# Patient Record
Sex: FEMALE | Race: WHITE | NOT HISPANIC OR LATINO | Employment: FULL TIME | URBAN - NONMETROPOLITAN AREA
[De-identification: names, ages, dates, MRNs, and addresses within clinical notes are randomized per-mention and may not be internally consistent; named-entity substitution may affect disease eponyms.]

---

## 2017-01-03 ENCOUNTER — TELEPHONE (OUTPATIENT)
Dept: GASTROENTEROLOGY | Facility: CLINIC | Age: 52
End: 2017-01-03

## 2017-09-30 ENCOUNTER — HOSPITAL ENCOUNTER (OUTPATIENT)
Age: 52
Setting detail: OBSERVATION
Discharge: HOME | End: 2017-10-01
Attending: EMERGENCY MEDICINE | Admitting: INTERNAL MEDICINE

## 2017-09-30 ENCOUNTER — APPOINTMENT (OUTPATIENT)
Dept: GENERAL RADIOLOGY | Age: 52
End: 2017-09-30

## 2017-09-30 DIAGNOSIS — R07.89 CHEST DISCOMFORT: Primary | ICD-10-CM

## 2017-09-30 LAB
ALBUMIN SERPL-MCNC: 4.2 G/DL (ref 3.5–5.2)
ALP BLD-CCNC: 87 U/L (ref 35–104)
ALT SERPL-CCNC: 21 U/L (ref 5–33)
ANION GAP SERPL CALCULATED.3IONS-SCNC: 12 MMOL/L (ref 7–19)
AST SERPL-CCNC: 20 U/L (ref 5–32)
BASOPHILS ABSOLUTE: 0.1 K/UL (ref 0–0.2)
BASOPHILS RELATIVE PERCENT: 0.8 % (ref 0–1)
BILIRUB SERPL-MCNC: <0.2 MG/DL (ref 0.2–1.2)
BUN BLDV-MCNC: 12 MG/DL (ref 6–20)
CALCIUM SERPL-MCNC: 9.6 MG/DL (ref 8.6–10)
CHLORIDE BLD-SCNC: 98 MMOL/L (ref 98–111)
CO2: 31 MMOL/L (ref 22–29)
CREAT SERPL-MCNC: 0.9 MG/DL (ref 0.5–0.9)
EOSINOPHILS ABSOLUTE: 0.3 K/UL (ref 0–0.6)
EOSINOPHILS RELATIVE PERCENT: 4.5 % (ref 0–5)
GFR NON-AFRICAN AMERICAN: >60
GLUCOSE BLD-MCNC: 117 MG/DL (ref 74–109)
HCT VFR BLD CALC: 37.9 % (ref 37–47)
HEMOGLOBIN: 12.2 G/DL (ref 12–16)
LYMPHOCYTES ABSOLUTE: 2.5 K/UL (ref 1.1–4.5)
LYMPHOCYTES RELATIVE PERCENT: 38 % (ref 20–40)
MCH RBC QN AUTO: 27.7 PG (ref 27–31)
MCHC RBC AUTO-ENTMCNC: 32.2 G/DL (ref 33–37)
MCV RBC AUTO: 86.1 FL (ref 81–99)
MONOCYTES ABSOLUTE: 0.5 K/UL (ref 0–0.9)
MONOCYTES RELATIVE PERCENT: 7.8 % (ref 0–10)
NEUTROPHILS ABSOLUTE: 3.1 K/UL (ref 1.5–7.5)
NEUTROPHILS RELATIVE PERCENT: 48.6 % (ref 50–65)
PDW BLD-RTO: 14.1 % (ref 11.5–14.5)
PERFORMED ON: NORMAL
PLATELET # BLD: 276 K/UL (ref 130–400)
PMV BLD AUTO: 9.9 FL (ref 9.4–12.3)
POC TROPONIN I: 0 NG/ML (ref 0–0.08)
POTASSIUM SERPL-SCNC: 3.6 MMOL/L (ref 3.5–5)
RBC # BLD: 4.4 M/UL (ref 4.2–5.4)
SODIUM BLD-SCNC: 141 MMOL/L (ref 136–145)
TOTAL PROTEIN: 7.4 G/DL (ref 6.6–8.7)
WBC # BLD: 6.5 K/UL (ref 4.8–10.8)

## 2017-09-30 PROCEDURE — 80053 COMPREHEN METABOLIC PANEL: CPT

## 2017-09-30 PROCEDURE — 71010 XR CHEST PORTABLE: CPT

## 2017-09-30 PROCEDURE — 85025 COMPLETE CBC W/AUTO DIFF WBC: CPT

## 2017-09-30 PROCEDURE — 2580000003 HC RX 258: Performed by: INTERNAL MEDICINE

## 2017-09-30 PROCEDURE — 6370000000 HC RX 637 (ALT 250 FOR IP): Performed by: EMERGENCY MEDICINE

## 2017-09-30 PROCEDURE — 36415 COLL VENOUS BLD VENIPUNCTURE: CPT

## 2017-09-30 PROCEDURE — G0378 HOSPITAL OBSERVATION PER HR: HCPCS

## 2017-09-30 PROCEDURE — 84484 ASSAY OF TROPONIN QUANT: CPT

## 2017-09-30 PROCEDURE — 96372 THER/PROPH/DIAG INJ SC/IM: CPT

## 2017-09-30 PROCEDURE — 6360000002 HC RX W HCPCS: Performed by: INTERNAL MEDICINE

## 2017-09-30 PROCEDURE — 99220 PR INITIAL OBSERVATION CARE/DAY 70 MINUTES: CPT | Performed by: INTERNAL MEDICINE

## 2017-09-30 PROCEDURE — 6370000000 HC RX 637 (ALT 250 FOR IP): Performed by: INTERNAL MEDICINE

## 2017-09-30 PROCEDURE — 99283 EMERGENCY DEPT VISIT LOW MDM: CPT | Performed by: EMERGENCY MEDICINE

## 2017-09-30 PROCEDURE — 93005 ELECTROCARDIOGRAM TRACING: CPT

## 2017-09-30 PROCEDURE — 99285 EMERGENCY DEPT VISIT HI MDM: CPT

## 2017-09-30 RX ORDER — CETIRIZINE HYDROCHLORIDE 10 MG/1
10 TABLET ORAL DAILY
COMMUNITY

## 2017-09-30 RX ORDER — PHENOL 1.4 %
AEROSOL, SPRAY (ML) MUCOUS MEMBRANE NIGHTLY
COMMUNITY

## 2017-09-30 RX ORDER — ONDANSETRON 2 MG/ML
4 INJECTION INTRAMUSCULAR; INTRAVENOUS EVERY 6 HOURS PRN
Status: DISCONTINUED | OUTPATIENT
Start: 2017-09-30 | End: 2017-10-01 | Stop reason: HOSPADM

## 2017-09-30 RX ORDER — LANOLIN ALCOHOL/MO/W.PET/CERES
9 CREAM (GRAM) TOPICAL NIGHTLY
Status: DISCONTINUED | OUTPATIENT
Start: 2017-09-30 | End: 2017-10-01 | Stop reason: HOSPADM

## 2017-09-30 RX ORDER — SODIUM CHLORIDE 0.9 % (FLUSH) 0.9 %
10 SYRINGE (ML) INJECTION EVERY 12 HOURS SCHEDULED
Status: DISCONTINUED | OUTPATIENT
Start: 2017-09-30 | End: 2017-10-01 | Stop reason: HOSPADM

## 2017-09-30 RX ORDER — VALSARTAN 160 MG/1
160 TABLET ORAL DAILY
Status: DISCONTINUED | OUTPATIENT
Start: 2017-09-30 | End: 2017-10-01 | Stop reason: HOSPADM

## 2017-09-30 RX ORDER — ASPIRIN 81 MG/1
81 TABLET, CHEWABLE ORAL DAILY
Status: DISCONTINUED | OUTPATIENT
Start: 2017-09-30 | End: 2017-10-01 | Stop reason: HOSPADM

## 2017-09-30 RX ORDER — ACETAMINOPHEN 325 MG/1
650 TABLET ORAL EVERY 4 HOURS PRN
Status: DISCONTINUED | OUTPATIENT
Start: 2017-09-30 | End: 2017-10-01 | Stop reason: HOSPADM

## 2017-09-30 RX ORDER — IBUPROFEN 600 MG/1
600 TABLET ORAL
Status: DISCONTINUED | OUTPATIENT
Start: 2017-09-30 | End: 2017-10-01 | Stop reason: HOSPADM

## 2017-09-30 RX ORDER — ACETAMINOPHEN 500 MG
1000 TABLET ORAL ONCE
Status: COMPLETED | OUTPATIENT
Start: 2017-09-30 | End: 2017-09-30

## 2017-09-30 RX ORDER — PANTOPRAZOLE SODIUM 40 MG/1
40 TABLET, DELAYED RELEASE ORAL
Status: DISCONTINUED | OUTPATIENT
Start: 2017-10-01 | End: 2017-10-01 | Stop reason: HOSPADM

## 2017-09-30 RX ORDER — ALPRAZOLAM 0.5 MG/1
0.5 TABLET ORAL NIGHTLY PRN
Status: DISCONTINUED | OUTPATIENT
Start: 2017-09-30 | End: 2017-10-01 | Stop reason: HOSPADM

## 2017-09-30 RX ORDER — ASPIRIN 81 MG/1
324 TABLET, CHEWABLE ORAL ONCE
Status: COMPLETED | OUTPATIENT
Start: 2017-09-30 | End: 2017-09-30

## 2017-09-30 RX ORDER — SODIUM CHLORIDE 0.9 % (FLUSH) 0.9 %
10 SYRINGE (ML) INJECTION PRN
Status: DISCONTINUED | OUTPATIENT
Start: 2017-09-30 | End: 2017-10-01 | Stop reason: HOSPADM

## 2017-09-30 RX ORDER — LEVOTHYROXINE SODIUM 0.05 MG/1
50 TABLET ORAL DAILY
Status: DISCONTINUED | OUTPATIENT
Start: 2017-10-01 | End: 2017-10-01 | Stop reason: HOSPADM

## 2017-09-30 RX ORDER — QUETIAPINE FUMARATE 300 MG/1
600 TABLET, FILM COATED ORAL NIGHTLY
Status: DISCONTINUED | OUTPATIENT
Start: 2017-09-30 | End: 2017-10-01 | Stop reason: HOSPADM

## 2017-09-30 RX ORDER — NITROGLYCERIN 0.4 MG/1
0.4 TABLET SUBLINGUAL EVERY 5 MIN PRN
Status: DISCONTINUED | OUTPATIENT
Start: 2017-09-30 | End: 2017-10-01 | Stop reason: HOSPADM

## 2017-09-30 RX ORDER — CETIRIZINE HYDROCHLORIDE 10 MG/1
10 TABLET ORAL DAILY
Status: DISCONTINUED | OUTPATIENT
Start: 2017-09-30 | End: 2017-10-01 | Stop reason: HOSPADM

## 2017-09-30 RX ADMIN — MELATONIN 3 MG ORAL TABLET 9 MG: 3 TABLET ORAL at 21:30

## 2017-09-30 RX ADMIN — ASPIRIN 81 MG CHEWABLE TABLET 324 MG: 81 TABLET CHEWABLE at 17:39

## 2017-09-30 RX ADMIN — NITROGLYCERIN 0.4 MG: 0.4 TABLET SUBLINGUAL at 17:39

## 2017-09-30 RX ADMIN — QUETIAPINE FUMARATE 600 MG: 300 TABLET, FILM COATED ORAL at 22:13

## 2017-09-30 RX ADMIN — Medication 10 ML: at 21:33

## 2017-09-30 RX ADMIN — ENOXAPARIN SODIUM 40 MG: 40 INJECTION SUBCUTANEOUS at 21:32

## 2017-09-30 RX ADMIN — ACETAMINOPHEN 1000 MG: 500 TABLET ORAL at 18:28

## 2017-09-30 ASSESSMENT — PAIN SCALES - GENERAL
PAINLEVEL_OUTOF10: 7
PAINLEVEL_OUTOF10: 5
PAINLEVEL_OUTOF10: 7
PAINLEVEL_OUTOF10: 7

## 2017-09-30 ASSESSMENT — ENCOUNTER SYMPTOMS
NAUSEA: 1
SHORTNESS OF BREATH: 1
ABDOMINAL PAIN: 0
BACK PAIN: 0
VOMITING: 0

## 2017-09-30 ASSESSMENT — PAIN DESCRIPTION - LOCATION
LOCATION: CHEST
LOCATION: CHEST

## 2017-09-30 ASSESSMENT — PAIN DESCRIPTION - PAIN TYPE: TYPE: ACUTE PAIN

## 2017-09-30 ASSESSMENT — PAIN DESCRIPTION - DESCRIPTORS: DESCRIPTORS: PRESSURE

## 2017-09-30 NOTE — IP AVS SNAPSHOT
Patient Information     Patient Name GRAEME Walls 1965         This is your updated medication list to keep with you all times      TAKE these medications     ALPRAZolam 0.5 MG tablet   Commonly known as:  XANAX       aspirin 81 MG chewable tablet   Take 1 tablet by mouth daily       CALCIUM 600+D 600-800 MG-UNIT Tabs   Generic drug:  Calcium Carb-Cholecalciferol       calcium-vitamin D 500-200 MG-UNIT per tablet       fluticasone 50 MCG/ACT nasal spray   Commonly known as:  FLONASE   2 sprays by Nasal route daily       levothyroxine 50 MCG tablet   Commonly known as:  SYNTHROID       Melatonin 10 MG Tabs       PRILOSEC 40 MG delayed release capsule   Generic drug:  omeprazole       SEROQUEL 300 MG tablet   Generic drug:  QUEtiapine       valsartan 160 MG tablet   Commonly known as:  DIOVAN       ZORVOLEX 18 MG Caps   Generic drug:  Diclofenac       ZYRTEC ALLERGY 10 MG tablet   Generic drug:  cetirizine

## 2017-09-30 NOTE — IP AVS SNAPSHOT
After Visit Summary  (Discharge Instructions)    Medication List for Home    Based on the information you provided to us as well as any changes during this visit, the following is your updated medication list.  Compare this with your prescription bottles at home. If you have any questions or concerns, contact your primary care physician's office. Daily Medication List (This medication list can be shared with any healthcare provider who is helping you manage your medications)      There are NEW medications for you.  START taking them after you leave the hospital        Last Dose    Next Dose Due AM NOON PM NIGHT    aspirin 81 MG chewable tablet   Take 1 tablet by mouth daily                81 mg on 10/1/2017 10:45 AM     10/2/17 9:00 am                            These are medications you told us you were taking at home, CONTINUE taking them after you leave the hospital        Last Dose    Next Dose Due AM NOON PM NIGHT    ALPRAZolam 0.5 MG tablet   Commonly known as:  XANAX   Take 0.5 mg by mouth nightly as needed for Sleep                  As needed                       CALCIUM 600+D 600-800 MG-UNIT Tabs   Generic drug:  Calcium Carb-Cholecalciferol   Take by mouth daily                  10/2/17 9:00 am                          calcium-vitamin D 500-200 MG-UNIT per tablet   Take 1 tablet by mouth 2 times daily (with meals)                  10/1/17 5:00 pm                          fluticasone 50 MCG/ACT nasal spray   Commonly known as:  FLONASE   2 sprays by Nasal route daily                  10/1/17 9:00 pm                          levothyroxine 50 MCG tablet   Commonly known as:  SYNTHROID   Take 50 mcg by mouth Daily                50 mcg on 10/1/2017  6:30 AM     10/2/17 6:00 am                          Melatonin 10 MG Tabs   Take by mouth nightly                9 mg on 9/30/2017  9:30 PM     10/1/17 9:00 pm                          PRILOSEC 40 MG delayed release capsule · Instructions about your medications including a list of your home medications  · A summary of your hospital visit  · Follow-up appointments once you have left the hospital  · Your care plan at home      You may receive a survey regarding the care you received during your stay. Your input is valuable to us. We encourage you to complete and return your survey in the envelope provided. We hope you will choose us in the future for your healthcare needs. Patient Information     Patient Name GRAEME Rojas 1965      Care Provided at:     Name Address Phone       [address] [phone]            Your Visit    Here you will find information about your visit, including the reason for your visit. Please take this sheet with you when you visit your doctor or other health care provider in the future. It will help determine the best possible medical care for you at that time. If you have any questions once you leave the hospital, please call the department phone number listed below. Why you were here     Your primary diagnosis was:  Not on File    Your diagnoses also included:  Chest Pressure      Visit Information     Date & Time Provider Department Dept. Phone    2017 Perla Stern MD [facility] 7 [facility] [phone]       Follow-up Appointments    Below is a list of your follow-up and future appointments. This may not be a complete list as you may have made appointments directly with providers that we are not aware of or your providers may have made some for you. Please call your providers to confirm appointments. It is important to keep your appointments. Please bring your current insurance card, photo ID, co-pay, and all medication bottles to your appointment. If self-pay, payment is expected at the time of service.         Follow-up Information     Follow up with Moses Wilburn MD. Schedule an appointment as soon as possible for a visit in 1 week. Specialty:  Family Medicine    Contact information:    [address]        Preventive Care        Date Due    Hepatitis C screening is recommended for all adults regardless of risk factors born between [facility] at least once (lifetime) who have never been tested. 1965    HIV screening is recommended for all people regardless of risk factors  aged 15-65 years at least once (lifetime) who have never been HIV tested. 7/7/1978    Tetanus Combination Vaccine (1 - Tdap) 7/7/1982    Diabetes Screening 7/7/2003    Mammograms are recommended every 2 years for low/average risk patients aged 48 - 69, and every year for high risk patients per updated national guidelines. However these guidelines can be individualized by your provider. 10/14/2016    Yearly Flu Vaccine (1) 9/1/2017    Cholesterol Screening 3/25/2020    Colonoscopy 10/2/2023                 Care Plan Once You Return Home    This section includes instructions you will need to follow once you leave the hospital.  Your care team will discuss these with you, so you and those caring for you know how to best care for your health needs at home. This section may also include educational information about certain health topics that may be of help to you. [employer] Signup     Our records indicate that you have an active [email or web address] account. You can view your After Visit Summary by going to [email or web address] and logging in with your [email or web address] username and password. If you don't have a [email or web address] username and password but a parent or guardian has access to your record, the parent or guardian should login with their own [email or web address] username and password and access your record to view the After Visit Summary.      Additional Information  If you have questions, please contact the physician practice where you receive care. Remember, MyChart is NOT to be used for urgent needs. For medical emergencies, dial 911. For questions regarding your MyChart account call [phone]. If you have a clinical question, please call your doctor's office. View your information online  ? Review your current list of  medications, immunization, and allergies. ? Review your future test results online . ? Review your discharge instructions provided by your caregivers at discharge    Certain functionality such as prescription refills, scheduling appointments or sending messages to your provider are not activated if your provider does not use [employer] in his/her office    For questions regarding your MyChart account call [phone]. If you have a clinical question, please call your doctor's office. The information on all pages of the After Visit Summary has been reviewed with me, the patient and/or responsible adult, by my health care provider(s). I had the opportunity to ask questions regarding this information. I understand I should dispose of my armband safely at home to protect my health information. A complete copy of the After Visit Summary has been given to me, the patient and/or responsible adult.            Patient Signature/Responsible Adult:____________________    Clinician Signature:_____________________    Date:_____________________    Time:_____________________

## 2017-09-30 NOTE — ED PROVIDER NOTES
as noted above the remainder of the review of systems was reviewed and negative. PAST MEDICAL HISTORY     Past Medical History:   Diagnosis Date    Anxiety     Bulging lumbar disc     L3-L4    Depression     GERD (gastroesophageal reflux disease)     HTN (hypertension)     Hypothyroid          SURGICAL HISTORY       Past Surgical History:   Procedure Laterality Date    CHOLECYSTECTOMY      HYSTERECTOMY           CURRENT MEDICATIONS       Current Discharge Medication List      CONTINUE these medications which have NOT CHANGED    Details   Calcium Carbonate-Vitamin D (CALCIUM-VITAMIN D) 500-200 MG-UNIT per tablet Take 1 tablet by mouth 2 times daily (with meals)      Melatonin 10 MG TABS Take by mouth      cetirizine (ZYRTEC ALLERGY) 10 MG tablet Take 10 mg by mouth daily      omeprazole (PRILOSEC) 40 MG capsule Take 20 mg by mouth daily       valsartan (DIOVAN) 160 MG tablet Take 160 mg by mouth daily      QUEtiapine (SEROQUEL) 300 MG tablet Take 600 mg by mouth nightly      Diclofenac (ZORVOLEX) 18 MG CAPS Take 50 mg by mouth three times daily       levothyroxine (SYNTHROID) 50 MCG tablet Take 50 mcg by mouth Daily      ALPRAZolam (XANAX) 0.5 MG tablet Take 0.5 mg by mouth nightly as needed for Sleep      fluticasone (FLONASE) 50 MCG/ACT nasal spray 2 sprays by Nasal route daily  Qty: 1 Bottle, Refills: 0             ALLERGIES     Review of patient's allergies indicates no known allergies.     FAMILY HISTORY       Family History   Problem Relation Age of Onset    Diabetes Mother     Heart Disease Mother     Stroke Father     Diabetes Maternal Aunt     Cancer Maternal Aunt     Heart Disease Maternal Uncle           SOCIAL HISTORY       Social History     Social History    Marital status:      Spouse name: N/A    Number of children: N/A    Years of education: N/A     Social History Main Topics    Smoking status: Never Smoker    Smokeless tobacco: Never Used    Alcohol use No    Drug use: No    Sexual activity: Yes     Partners: Male     Other Topics Concern    None     Social History Narrative       SCREENINGS             PHYSICAL EXAM    (up to 7 for level 4, 8 or more for level 5)   ED Triage Vitals   BP Temp Temp Source Pulse Resp SpO2 Height Weight   09/30/17 1627 09/30/17 1627 09/30/17 1627 09/30/17 1627 09/30/17 1627 09/30/17 1627 09/30/17 1627 09/30/17 1627   131/83 98.2 °F (36.8 °C) Oral 92 18 94 % 5' 3\" (1.6 m) 240 lb (108.9 kg)       Physical Exam   Constitutional: She is oriented to person, place, and time. She appears well-developed and well-nourished. No distress. HENT:   Mouth/Throat: Oropharynx is clear and moist.   Neck: Normal range of motion. Neck supple. Cardiovascular: Normal rate, regular rhythm and normal heart sounds. Pulmonary/Chest: Effort normal and breath sounds normal.   Musculoskeletal: She exhibits no edema. Neurological: She is alert and oriented to person, place, and time. Skin: Skin is warm and dry. She is not diaphoretic. Psychiatric: She has a normal mood and affect. Nursing note and vitals reviewed. DIAGNOSTIC RESULTS     EKG: All EKG's are interpreted by the Emergency Department Physician who either signs or Co-signs this chart in the absence of a cardiologist.     Regular rate and rhythm. Normal P waves and MA interval. Normal QRS. Normal QT interval. No ST elevation or depression. This EKG was interpreted by me. RADIOLOGY:   Non-plain film images such as CT, Ultrasound and MRI are read by the radiologist. Plain radiographic images are visualized and preliminarily interpreted by the emergency physician with the below findings:        Interpretation per the Radiologist below, if available at the time of this note:    XR Chest Portable   Final Result   Impression:   No acute cardiopulmonary findings.    Signed by Dr Ileene Cheadle on 9/30/2017 6:19 PM      NM Myocardial Spect Rest Multi    (Results Pending)         ED BEDSIDE ULTRASOUND: List             (Please note that portions of this note were completed with a voice recognition program.  Efforts were made to edit the dictations but occasionally words are mis-transcribed.)    Brad Ochoa MD (electronically signed)  Attending Emergency Physician       Brad Ochoa MD  09/30/17 3571

## 2017-10-01 ENCOUNTER — APPOINTMENT (OUTPATIENT)
Dept: NUCLEAR MEDICINE | Age: 52
End: 2017-10-01

## 2017-10-01 VITALS
SYSTOLIC BLOOD PRESSURE: 118 MMHG | RESPIRATION RATE: 16 BRPM | WEIGHT: 231.2 LBS | OXYGEN SATURATION: 95 % | BODY MASS INDEX: 40.96 KG/M2 | TEMPERATURE: 98.2 F | HEART RATE: 95 BPM | DIASTOLIC BLOOD PRESSURE: 83 MMHG | HEIGHT: 63 IN

## 2017-10-01 LAB
ALBUMIN SERPL-MCNC: 3.5 G/DL (ref 3.5–5.2)
ALP BLD-CCNC: 77 U/L (ref 35–104)
ALT SERPL-CCNC: 19 U/L (ref 5–33)
ANION GAP SERPL CALCULATED.3IONS-SCNC: 13 MMOL/L (ref 7–19)
AST SERPL-CCNC: 18 U/L (ref 5–32)
BASOPHILS ABSOLUTE: 0 K/UL (ref 0–0.2)
BASOPHILS RELATIVE PERCENT: 0.6 % (ref 0–1)
BILIRUB SERPL-MCNC: <0.2 MG/DL (ref 0.2–1.2)
BUN BLDV-MCNC: 12 MG/DL (ref 6–20)
CALCIUM SERPL-MCNC: 9.4 MG/DL (ref 8.6–10)
CHLORIDE BLD-SCNC: 99 MMOL/L (ref 98–111)
CHOLESTEROL, TOTAL: 205 MG/DL (ref 160–199)
CO2: 27 MMOL/L (ref 22–29)
CREAT SERPL-MCNC: 0.9 MG/DL (ref 0.5–0.9)
EOSINOPHILS ABSOLUTE: 0.3 K/UL (ref 0–0.6)
EOSINOPHILS RELATIVE PERCENT: 3.8 % (ref 0–5)
GFR NON-AFRICAN AMERICAN: >60
GLUCOSE BLD-MCNC: 118 MG/DL (ref 74–109)
HCT VFR BLD CALC: 33.8 % (ref 37–47)
HDLC SERPL-MCNC: 52 MG/DL (ref 65–121)
HEMOGLOBIN: 10.9 G/DL (ref 12–16)
LDL CHOLESTEROL CALCULATED: 131 MG/DL
LV EF: 58 %
LVEF MODALITY: NORMAL
LYMPHOCYTES ABSOLUTE: 2.7 K/UL (ref 1.1–4.5)
LYMPHOCYTES RELATIVE PERCENT: 39.4 % (ref 20–40)
MCH RBC QN AUTO: 27.8 PG (ref 27–31)
MCHC RBC AUTO-ENTMCNC: 32.2 G/DL (ref 33–37)
MCV RBC AUTO: 86.2 FL (ref 81–99)
MONOCYTES ABSOLUTE: 0.5 K/UL (ref 0–0.9)
MONOCYTES RELATIVE PERCENT: 7.2 % (ref 0–10)
NEUTROPHILS ABSOLUTE: 3.3 K/UL (ref 1.5–7.5)
NEUTROPHILS RELATIVE PERCENT: 48.7 % (ref 50–65)
PDW BLD-RTO: 14.3 % (ref 11.5–14.5)
PLATELET # BLD: 246 K/UL (ref 130–400)
PMV BLD AUTO: 10.5 FL (ref 9.4–12.3)
POTASSIUM SERPL-SCNC: 3.6 MMOL/L (ref 3.5–5)
RBC # BLD: 3.92 M/UL (ref 4.2–5.4)
SODIUM BLD-SCNC: 139 MMOL/L (ref 136–145)
TOTAL PROTEIN: 6.6 G/DL (ref 6.6–8.7)
TRIGL SERPL-MCNC: 111 MG/DL (ref 150–199)
TROPONIN: <0.01 NG/ML (ref 0–0.03)
TROPONIN: <0.01 NG/ML (ref 0–0.03)
WBC # BLD: 6.8 K/UL (ref 4.8–10.8)

## 2017-10-01 PROCEDURE — 84484 ASSAY OF TROPONIN QUANT: CPT

## 2017-10-01 PROCEDURE — 36415 COLL VENOUS BLD VENIPUNCTURE: CPT

## 2017-10-01 PROCEDURE — 2580000003 HC RX 258: Performed by: INTERNAL MEDICINE

## 2017-10-01 PROCEDURE — G0378 HOSPITAL OBSERVATION PER HR: HCPCS

## 2017-10-01 PROCEDURE — 78452 HT MUSCLE IMAGE SPECT MULT: CPT

## 2017-10-01 PROCEDURE — 3430000000 HC RX DIAGNOSTIC RADIOPHARMACEUTICAL: Performed by: INTERNAL MEDICINE

## 2017-10-01 PROCEDURE — 80053 COMPREHEN METABOLIC PANEL: CPT

## 2017-10-01 PROCEDURE — A9500 TC99M SESTAMIBI: HCPCS | Performed by: INTERNAL MEDICINE

## 2017-10-01 PROCEDURE — 93005 ELECTROCARDIOGRAM TRACING: CPT

## 2017-10-01 PROCEDURE — 85025 COMPLETE CBC W/AUTO DIFF WBC: CPT

## 2017-10-01 PROCEDURE — 99217 PR OBSERVATION CARE DISCHARGE MANAGEMENT: CPT | Performed by: INTERNAL MEDICINE

## 2017-10-01 PROCEDURE — 80061 LIPID PANEL: CPT

## 2017-10-01 PROCEDURE — 93017 CV STRESS TEST TRACING ONLY: CPT

## 2017-10-01 PROCEDURE — 6360000002 HC RX W HCPCS: Performed by: INTERNAL MEDICINE

## 2017-10-01 PROCEDURE — 6370000000 HC RX 637 (ALT 250 FOR IP): Performed by: INTERNAL MEDICINE

## 2017-10-01 PROCEDURE — 93306 TTE W/DOPPLER COMPLETE: CPT

## 2017-10-01 RX ORDER — ASPIRIN 81 MG/1
81 TABLET, CHEWABLE ORAL DAILY
Qty: 30 TABLET | Refills: 3 | Status: ON HOLD | OUTPATIENT
Start: 2017-10-01 | End: 2018-06-15 | Stop reason: HOSPADM

## 2017-10-01 RX ADMIN — TETRAKIS(2-METHOXYISOBUTYLISOCYANIDE)COPPER(I) TETRAFLUOROBORATE 10 MILLICURIE: 1 INJECTION, POWDER, LYOPHILIZED, FOR SOLUTION INTRAVENOUS at 09:42

## 2017-10-01 RX ADMIN — ASPIRIN 81 MG CHEWABLE TABLET 81 MG: 81 TABLET CHEWABLE at 10:45

## 2017-10-01 RX ADMIN — Medication 10 ML: at 10:46

## 2017-10-01 RX ADMIN — Medication 1 TABLET: at 10:46

## 2017-10-01 RX ADMIN — LEVOTHYROXINE SODIUM 50 MCG: 50 TABLET ORAL at 06:30

## 2017-10-01 RX ADMIN — TETRAKIS(2-METHOXYISOBUTYLISOCYANIDE)COPPER(I) TETRAFLUOROBORATE 30 MILLICURIE: 1 INJECTION, POWDER, LYOPHILIZED, FOR SOLUTION INTRAVENOUS at 09:42

## 2017-10-01 RX ADMIN — REGADENOSON 0.4 MG: 0.08 INJECTION, SOLUTION INTRAVENOUS at 07:00

## 2017-10-01 RX ADMIN — VALSARTAN 160 MG: 160 TABLET ORAL at 11:25

## 2017-10-01 RX ADMIN — PANTOPRAZOLE SODIUM 40 MG: 40 TABLET, DELAYED RELEASE ORAL at 06:30

## 2017-10-01 RX ADMIN — ACETAMINOPHEN 650 MG: 325 TABLET, FILM COATED ORAL at 11:26

## 2017-10-01 ASSESSMENT — PAIN DESCRIPTION - PAIN TYPE: TYPE: ACUTE PAIN

## 2017-10-01 ASSESSMENT — PAIN SCALES - GENERAL
PAINLEVEL_OUTOF10: 0
PAINLEVEL_OUTOF10: 5
PAINLEVEL_OUTOF10: 0

## 2017-10-01 ASSESSMENT — PAIN DESCRIPTION - LOCATION: LOCATION: HEAD

## 2017-10-01 NOTE — H&P
Surgical History:    Past Surgical History:   Procedure Laterality Date    CHOLECYSTECTOMY      HYSTERECTOMY           Home Medications:   Prior to Admission medications    Medication Sig Start Date End Date Taking? Authorizing Provider   Calcium Carbonate-Vitamin D (CALCIUM-VITAMIN D) 500-200 MG-UNIT per tablet Take 1 tablet by mouth 2 times daily (with meals)   Yes Historical Provider, MD   Melatonin 10 MG TABS Take by mouth   Yes Historical Provider, MD   cetirizine (ZYRTEC ALLERGY) 10 MG tablet Take 10 mg by mouth daily   Yes Historical Provider, MD   omeprazole (PRILOSEC) 40 MG capsule Take 20 mg by mouth daily    Yes Historical Provider, MD   valsartan (DIOVAN) 160 MG tablet Take 160 mg by mouth daily   Yes Historical Provider, MD   QUEtiapine (SEROQUEL) 300 MG tablet Take 600 mg by mouth nightly   Yes Historical Provider, MD   Diclofenac (ZORVOLEX) 18 MG CAPS Take 50 mg by mouth three times daily    Yes Historical Provider, MD   levothyroxine (SYNTHROID) 50 MCG tablet Take 50 mcg by mouth Daily   Yes Historical Provider, MD   ALPRAZolam Malini Single) 0.5 MG tablet Take 0.5 mg by mouth nightly as needed for Sleep   Yes Historical Provider, MD   fluticasone (FLONASE) 50 MCG/ACT nasal spray 2 sprays by Nasal route daily 11/14/16   Kayli Becerril PA-C        Facility Administered Medications:    sodium chloride flush  10 mL Intravenous 2 times per day    enoxaparin  40 mg Subcutaneous Nightly    aspirin  81 mg Oral Daily    [START ON 10/1/2017] regadenoson  0.4 mg Intravenous Once       Allergies:  Review of patient's allergies indicates no known allergies. Social History:       Social History     Social History    Marital status:      Spouse name: N/A    Number of children: N/A    Years of education: N/A     Occupational History    Not on file.      Social History Main Topics    Smoking status: Never Smoker    Smokeless tobacco: Never Used    Alcohol use No    Drug use: No    Sexual activity: Not on file     Other Topics Concern    Not on file     Social History Narrative       Family History:   History reviewed. No pertinent family history. REVIEW OF SYSTEMS:     Except as noted in the HPI, all other systems are negative        PHYSICAL EXAMINATION:     /76  Pulse 74  Temp 98.2 °F (36.8 °C) (Oral)   Resp 16  Ht 5' 3\" (1.6 m)  Wt 240 lb (108.9 kg)  SpO2 95%  BMI 42.51 kg/m2    GENERAL - well developed and well nourished, in no amount of generalized distress  HEENT   PERRLA, Hearing appears normal, conjunctiva and lids are normal, ears and nose appear normal  NECK - no thyromegaly, no JVD, trachea is in the midline  CARDIOVASCULAR  PMI is in the left mid line clavicular position, Normal S1 and S2 with a grade 1/6 systolic murmur. No S3 or S4    PULMONARY   no respiratory distress. scattered wheezes and rales.    ABDOMEN   soft, non tender, no rebound, no hepatomegaly or splenomegaly  MUSCULOSKELETAL   Prone/Supine, digitals and nails are without clubbing or cyanosis  EXTREMITIES - trace edema  NEUROLOGIC - cranial nerves, II-XII, are normal  SKIN - turgor is normal, no rash  PSYCHIATRIC - normal mood and affect, alert and orientated x 3, judgement and insight appear appropriate      LABORATORY EVALUATION & TESTING:    I have personally reviewed and interpreted the results of the following diagnostic testing      EKG and or Telemetry:  which was personally reviewed me:  Sinus rhythm,   Pulse Readings from Last 1 Encounters:   09/30/17 74    bpm,  without Acute changes    Troponin:  negative myocardial necrosis (  0)    CBC:   Recent Labs      09/30/17   1653   WBC  6.5   HGB  12.2   HCT  37.9   MCV  86.1   PLT  276     BMP:   Recent Labs      09/30/17   1653   NA  141   K  3.6   CL  98   CO2  31*   BUN  12   CREATININE  0.9     Cardiac Enzymes:   Recent Labs      09/30/17   1658   TROPONINI  0.00     PT/INR: No results for input(s): PROTIME, INR in the last 72 2. hypertension Initial presentation during this evaluation   Patient has a history of hypertension, which is managed and is on current therapy. The blood pressure history is:    Systolic (32DTM), KRB:954 , Min:121 , PRISCILLA:385    Diastolic (83VEQ), FD, Min:70, Max:83    No Continue current medications:    Yes           3. Family history of CAD Initial presentation during this evaluation History of MVP Yes    echocardiogram Continue current medications:       NA         PLAN:    1. Continue present medications except for changes as noted above  2. Continue to monitor rhythm  3. Further orders per clinical course. 4. See orders           Discussed with patient and family and nursing.     I greatly appreciate the opportunity and your confidence in allowing me to participate in the care of Stalin Gomez    Electronically signed by Chery Quintanilla MD on 17     [facility] [place]

## 2017-10-01 NOTE — DISCHARGE SUMMARY
ischemia by imaging criteria. This was a most reassuring report and strongly suggested that the patient's chest discomfort was of low probably of representing myocardial ischemia. The rest of the patient's hospitalization was uneventful. She was discharged home on medical therapy with outpatient follow up. Consults:     None      Significant Diagnostic Studies:    1. lexiscan / cardiolyte, 10/01/17     Significant Therapeutic Endeavors:      1. none      Activity: activity as directed per discharge instructions. Diet:  Cardiac diet    Labs:  For convenience and continuity at follow-up the following most recent labs are provided:    CBC:    Lab Results   Component Value Date    WBC 6.8 10/01/2017    HGB 10.9 10/01/2017    HCT 33.8 10/01/2017     10/01/2017       Renal:    Lab Results   Component Value Date     10/01/2017    K 3.6 10/01/2017    CL 99 10/01/2017    CO2 27 10/01/2017    BUN 12 10/01/2017    CREATININE 0.9 10/01/2017    CALCIUM 9.4 10/01/2017         Discharge Medications:     Current Discharge Medication List           Details   Calcium Carbonate-Vitamin D (CALCIUM-VITAMIN D) 500-200 MG-UNIT per tablet Take 1 tablet by mouth 2 times daily (with meals)      Melatonin 10 MG TABS Take by mouth nightly       cetirizine (ZYRTEC ALLERGY) 10 MG tablet Take 10 mg by mouth daily      Calcium Carb-Cholecalciferol (CALCIUM 600+D) 600-800 MG-UNIT TABS Take by mouth daily      fluticasone (FLONASE) 50 MCG/ACT nasal spray 2 sprays by Nasal route daily  Qty: 1 Bottle, Refills: 0      omeprazole (PRILOSEC) 40 MG capsule Take 20 mg by mouth daily       valsartan (DIOVAN) 160 MG tablet Take 160 mg by mouth daily      QUEtiapine (SEROQUEL) 300 MG tablet Take 600 mg by mouth nightly      Diclofenac (ZORVOLEX) 18 MG CAPS Take 50 mg by mouth three times daily       levothyroxine (SYNTHROID) 50 MCG tablet Take 50 mcg by mouth Daily      ALPRAZolam (XANAX) 0.5 MG tablet Take 0.5 mg by mouth nightly

## 2017-10-01 NOTE — ED NOTES
Introduced myself to pt. Pt resting comfortably in bed with family at bedside. No s/s of distress noted. resp even and unlabored. Skin warm and dry. Call light within reach.       Krysta Sanchez RN  09/30/17 2439

## 2017-10-01 NOTE — PROGRESS NOTES
Pt arrived to PCU approximately 1950. She is alert and oriented with no complaints at this time. Telemetry monitor has been applied and shows sinus rhythm at 95. Pt is on room air. Admission complete, medications reviewed and assessment complete. Dr Deborah Jiménez saw pt in the ER and is aware of arrival. NPO status after midnight discussed with pt and she voiced understanding. Call light within reach. Will continue to monitor.

## 2017-10-02 LAB
EKG P AXIS: 36 DEGREES
EKG P-R INTERVAL: 136 MS
EKG Q-T INTERVAL: 360 MS
EKG QRS DURATION: 80 MS
EKG QTC CALCULATION (BAZETT): 437 MS
EKG T AXIS: 48 DEGREES

## 2017-10-05 LAB
LV EF: 71 %
LVEF MODALITY: NORMAL

## 2017-11-30 LAB — HBA1C MFR BLD: 5.5 %

## 2018-06-08 ENCOUNTER — HOSPITAL ENCOUNTER (INPATIENT)
Age: 53
LOS: 7 days | Discharge: HOME | DRG: 378 | End: 2018-06-15
Attending: FAMILY MEDICINE | Admitting: FAMILY MEDICINE
Payer: COMMERCIAL

## 2018-06-08 ENCOUNTER — APPOINTMENT (OUTPATIENT)
Dept: CT IMAGING | Age: 53
DRG: 378 | End: 2018-06-08
Attending: FAMILY MEDICINE
Payer: COMMERCIAL

## 2018-06-08 ENCOUNTER — APPOINTMENT (OUTPATIENT)
Dept: GENERAL RADIOLOGY | Age: 53
DRG: 378 | End: 2018-06-08
Attending: FAMILY MEDICINE
Payer: COMMERCIAL

## 2018-06-08 PROBLEM — R55 SYNCOPE, NEAR: Status: ACTIVE | Noted: 2018-06-08

## 2018-06-08 LAB
ALBUMIN SERPL-MCNC: 4 G/DL (ref 3.5–5.2)
ALP BLD-CCNC: 78 U/L (ref 35–104)
ALT SERPL-CCNC: 14 U/L (ref 5–33)
ANION GAP SERPL CALCULATED.3IONS-SCNC: 11 MMOL/L (ref 7–19)
AST SERPL-CCNC: 14 U/L (ref 5–32)
BILIRUB SERPL-MCNC: <0.2 MG/DL (ref 0.2–1.2)
BILIRUBIN URINE: NEGATIVE
BLOOD, URINE: NEGATIVE
BUN BLDV-MCNC: 19 MG/DL (ref 6–20)
CALCIUM SERPL-MCNC: 9.7 MG/DL (ref 8.6–10)
CHLORIDE BLD-SCNC: 100 MMOL/L (ref 98–111)
CLARITY: ABNORMAL
CO2: 30 MMOL/L (ref 22–29)
COLOR: YELLOW
CREAT SERPL-MCNC: 0.9 MG/DL (ref 0.5–0.9)
GFR NON-AFRICAN AMERICAN: >60
GLUCOSE BLD-MCNC: 150 MG/DL (ref 74–109)
GLUCOSE URINE: NEGATIVE MG/DL
HCT VFR BLD CALC: 29.5 % (ref 37–47)
HEMOGLOBIN: 8.5 G/DL (ref 12–16)
KETONES, URINE: NEGATIVE MG/DL
LEUKOCYTE ESTERASE, URINE: NEGATIVE
LV EF: 58 %
LVEF MODALITY: NORMAL
MCH RBC QN AUTO: 22.6 PG (ref 27–31)
MCHC RBC AUTO-ENTMCNC: 28.8 G/DL (ref 33–37)
MCV RBC AUTO: 78.5 FL (ref 81–99)
NITRITE, URINE: NEGATIVE
PDW BLD-RTO: 15.6 % (ref 11.5–14.5)
PH UA: 5.5
PLATELET # BLD: 309 K/UL (ref 130–400)
PMV BLD AUTO: 9.5 FL (ref 9.4–12.3)
POTASSIUM SERPL-SCNC: 3.8 MMOL/L (ref 3.5–5)
PRO-BNP: 16 PG/ML (ref 0–900)
PROTEIN UA: NEGATIVE MG/DL
RBC # BLD: 3.76 M/UL (ref 4.2–5.4)
SODIUM BLD-SCNC: 141 MMOL/L (ref 136–145)
SPECIFIC GRAVITY UA: 1.02
TOTAL PROTEIN: 7.1 G/DL (ref 6.6–8.7)
TSH SERPL DL<=0.05 MIU/L-ACNC: 1.08 UIU/ML (ref 0.27–4.2)
UROBILINOGEN, URINE: 0.2 E.U./DL
WBC # BLD: 7 K/UL (ref 4.8–10.8)

## 2018-06-08 PROCEDURE — 87086 URINE CULTURE/COLONY COUNT: CPT

## 2018-06-08 PROCEDURE — 70450 CT HEAD/BRAIN W/O DYE: CPT

## 2018-06-08 PROCEDURE — 85027 COMPLETE CBC AUTOMATED: CPT

## 2018-06-08 PROCEDURE — 2580000003 HC RX 258: Performed by: FAMILY MEDICINE

## 2018-06-08 PROCEDURE — 6370000000 HC RX 637 (ALT 250 FOR IP): Performed by: FAMILY MEDICINE

## 2018-06-08 PROCEDURE — 36415 COLL VENOUS BLD VENIPUNCTURE: CPT

## 2018-06-08 PROCEDURE — 83880 ASSAY OF NATRIURETIC PEPTIDE: CPT

## 2018-06-08 PROCEDURE — 80053 COMPREHEN METABOLIC PANEL: CPT

## 2018-06-08 PROCEDURE — 81003 URINALYSIS AUTO W/O SCOPE: CPT

## 2018-06-08 PROCEDURE — 6360000002 HC RX W HCPCS: Performed by: FAMILY MEDICINE

## 2018-06-08 PROCEDURE — 1210000000 HC MED SURG R&B

## 2018-06-08 PROCEDURE — 93306 TTE W/DOPPLER COMPLETE: CPT

## 2018-06-08 PROCEDURE — 84443 ASSAY THYROID STIM HORMONE: CPT

## 2018-06-08 PROCEDURE — G0328 FECAL BLOOD SCRN IMMUNOASSAY: HCPCS

## 2018-06-08 PROCEDURE — 93880 EXTRACRANIAL BILAT STUDY: CPT

## 2018-06-08 PROCEDURE — 71046 X-RAY EXAM CHEST 2 VIEWS: CPT

## 2018-06-08 RX ORDER — MECLIZINE HCL 12.5 MG/1
12.5 TABLET ORAL 3 TIMES DAILY PRN
Status: DISCONTINUED | OUTPATIENT
Start: 2018-06-08 | End: 2018-06-15 | Stop reason: HOSPADM

## 2018-06-08 RX ORDER — ALPRAZOLAM 0.5 MG/1
0.5 TABLET ORAL NIGHTLY PRN
Status: DISCONTINUED | OUTPATIENT
Start: 2018-06-08 | End: 2018-06-15 | Stop reason: HOSPADM

## 2018-06-08 RX ORDER — LEVOTHYROXINE SODIUM 0.05 MG/1
50 TABLET ORAL DAILY
Status: DISCONTINUED | OUTPATIENT
Start: 2018-06-09 | End: 2018-06-15 | Stop reason: HOSPADM

## 2018-06-08 RX ORDER — FLUTICASONE PROPIONATE 50 MCG
2 SPRAY, SUSPENSION (ML) NASAL DAILY
Status: DISCONTINUED | OUTPATIENT
Start: 2018-06-08 | End: 2018-06-15 | Stop reason: HOSPADM

## 2018-06-08 RX ORDER — CETIRIZINE HYDROCHLORIDE 10 MG/1
10 TABLET ORAL DAILY
Status: DISCONTINUED | OUTPATIENT
Start: 2018-06-08 | End: 2018-06-15 | Stop reason: HOSPADM

## 2018-06-08 RX ORDER — SODIUM CHLORIDE 9 MG/ML
INJECTION, SOLUTION INTRAVENOUS CONTINUOUS
Status: DISCONTINUED | OUTPATIENT
Start: 2018-06-08 | End: 2018-06-15 | Stop reason: HOSPADM

## 2018-06-08 RX ORDER — UREA 10 %
3 LOTION (ML) TOPICAL NIGHTLY
Status: DISCONTINUED | OUTPATIENT
Start: 2018-06-08 | End: 2018-06-15 | Stop reason: HOSPADM

## 2018-06-08 RX ORDER — QUETIAPINE FUMARATE 300 MG/1
600 TABLET, FILM COATED ORAL NIGHTLY
Status: DISCONTINUED | OUTPATIENT
Start: 2018-06-08 | End: 2018-06-15 | Stop reason: HOSPADM

## 2018-06-08 RX ORDER — PANTOPRAZOLE SODIUM 40 MG/1
40 TABLET, DELAYED RELEASE ORAL
Status: DISCONTINUED | OUTPATIENT
Start: 2018-06-09 | End: 2018-06-09

## 2018-06-08 RX ADMIN — SODIUM CHLORIDE: 9 INJECTION, SOLUTION INTRAVENOUS at 17:07

## 2018-06-08 RX ADMIN — CETIRIZINE HYDROCHLORIDE 10 MG: 10 TABLET ORAL at 20:07

## 2018-06-08 RX ADMIN — Medication 3 MG: at 20:07

## 2018-06-08 RX ADMIN — QUETIAPINE FUMARATE 600 MG: 300 TABLET ORAL at 20:07

## 2018-06-08 RX ADMIN — ENOXAPARIN SODIUM 40 MG: 100 INJECTION SUBCUTANEOUS at 17:06

## 2018-06-08 RX ADMIN — MECLIZINE 12.5 MG: 12.5 TABLET ORAL at 20:07

## 2018-06-08 ASSESSMENT — PAIN SCALES - GENERAL: PAINLEVEL_OUTOF10: 3

## 2018-06-09 PROBLEM — I10 ESSENTIAL HYPERTENSION: Status: ACTIVE | Noted: 2018-06-09

## 2018-06-09 PROBLEM — F32.A DEPRESSION: Status: ACTIVE | Noted: 2018-06-09

## 2018-06-09 PROBLEM — D50.9 IRON DEFICIENCY ANEMIA: Status: ACTIVE | Noted: 2018-06-09

## 2018-06-09 PROBLEM — E03.9 HYPOTHYROIDISM: Status: ACTIVE | Noted: 2018-06-09

## 2018-06-09 PROBLEM — E78.5 HYPERLIPEMIA: Status: ACTIVE | Noted: 2018-06-09

## 2018-06-09 LAB
FERRITIN: 6.6 NG/ML (ref 13–150)
HCT VFR BLD CALC: 26.1 % (ref 37–47)
HCT VFR BLD CALC: 27.5 % (ref 37–47)
HEMOGLOBIN: 7.6 G/DL (ref 12–16)
HEMOGLOBIN: 7.8 G/DL (ref 12–16)
IRON SATURATION: 8 % (ref 14–50)
IRON: 24 UG/DL (ref 37–145)
MCH RBC QN AUTO: 23 PG (ref 27–31)
MCHC RBC AUTO-ENTMCNC: 29.1 G/DL (ref 33–37)
MCV RBC AUTO: 79.1 FL (ref 81–99)
PDW BLD-RTO: 15.6 % (ref 11.5–14.5)
PLATELET # BLD: 291 K/UL (ref 130–400)
PMV BLD AUTO: 9.9 FL (ref 9.4–12.3)
RBC # BLD: 3.3 M/UL (ref 4.2–5.4)
RETICULOCYTE ABSOLUTE COUNT: 0.07 M/UL (ref 0.03–0.12)
RETICULOCYTE COUNT PCT: 2.11 % (ref 0.5–1.5)
TOTAL IRON BINDING CAPACITY: 298 UG/DL (ref 250–400)
VITAMIN B-12: 345 PG/ML (ref 211–946)
WBC # BLD: 7.2 K/UL (ref 4.8–10.8)

## 2018-06-09 PROCEDURE — 83550 IRON BINDING TEST: CPT

## 2018-06-09 PROCEDURE — 6360000002 HC RX W HCPCS: Performed by: INTERNAL MEDICINE

## 2018-06-09 PROCEDURE — 6370000000 HC RX 637 (ALT 250 FOR IP): Performed by: FAMILY MEDICINE

## 2018-06-09 PROCEDURE — 83540 ASSAY OF IRON: CPT

## 2018-06-09 PROCEDURE — 6370000000 HC RX 637 (ALT 250 FOR IP): Performed by: INTERNAL MEDICINE

## 2018-06-09 PROCEDURE — 6360000002 HC RX W HCPCS: Performed by: FAMILY MEDICINE

## 2018-06-09 PROCEDURE — 85018 HEMOGLOBIN: CPT

## 2018-06-09 PROCEDURE — 1210000000 HC MED SURG R&B

## 2018-06-09 PROCEDURE — 85027 COMPLETE CBC AUTOMATED: CPT

## 2018-06-09 PROCEDURE — 2580000003 HC RX 258: Performed by: FAMILY MEDICINE

## 2018-06-09 PROCEDURE — 82607 VITAMIN B-12: CPT

## 2018-06-09 PROCEDURE — 85014 HEMATOCRIT: CPT

## 2018-06-09 PROCEDURE — C9113 INJ PANTOPRAZOLE SODIUM, VIA: HCPCS | Performed by: INTERNAL MEDICINE

## 2018-06-09 PROCEDURE — 85045 AUTOMATED RETICULOCYTE COUNT: CPT

## 2018-06-09 PROCEDURE — 36415 COLL VENOUS BLD VENIPUNCTURE: CPT

## 2018-06-09 PROCEDURE — 82728 ASSAY OF FERRITIN: CPT

## 2018-06-09 RX ORDER — FERROUS SULFATE 325(65) MG
325 TABLET ORAL
Status: DISCONTINUED | OUTPATIENT
Start: 2018-06-09 | End: 2018-06-15 | Stop reason: HOSPADM

## 2018-06-09 RX ORDER — PANTOPRAZOLE SODIUM 40 MG/10ML
40 INJECTION, POWDER, LYOPHILIZED, FOR SOLUTION INTRAVENOUS DAILY
Status: DISCONTINUED | OUTPATIENT
Start: 2018-06-09 | End: 2018-06-10

## 2018-06-09 RX ORDER — VALSARTAN 160 MG/1
160 TABLET ORAL DAILY
Status: DISCONTINUED | OUTPATIENT
Start: 2018-06-09 | End: 2018-06-15 | Stop reason: HOSPADM

## 2018-06-09 RX ORDER — ONDANSETRON 2 MG/ML
4 INJECTION INTRAMUSCULAR; INTRAVENOUS EVERY 6 HOURS PRN
Status: DISCONTINUED | OUTPATIENT
Start: 2018-06-09 | End: 2018-06-15 | Stop reason: HOSPADM

## 2018-06-09 RX ORDER — SUCRALFATE 1 G/1
1 TABLET ORAL 4 TIMES DAILY
Status: DISCONTINUED | OUTPATIENT
Start: 2018-06-09 | End: 2018-06-15 | Stop reason: HOSPADM

## 2018-06-09 RX ORDER — IBUPROFEN 400 MG/1
600 TABLET ORAL EVERY 8 HOURS PRN
Status: DISCONTINUED | OUTPATIENT
Start: 2018-06-09 | End: 2018-06-09

## 2018-06-09 RX ORDER — ASPIRIN 81 MG/1
81 TABLET, CHEWABLE ORAL DAILY
Status: DISCONTINUED | OUTPATIENT
Start: 2018-06-09 | End: 2018-06-10

## 2018-06-09 RX ORDER — SUCRALFATE 1 G/1
1 TABLET ORAL 4 TIMES DAILY
COMMUNITY

## 2018-06-09 RX ADMIN — LEVOTHYROXINE SODIUM 50 MCG: 50 TABLET ORAL at 05:22

## 2018-06-09 RX ADMIN — PANTOPRAZOLE SODIUM 40 MG: 40 TABLET, DELAYED RELEASE ORAL at 05:22

## 2018-06-09 RX ADMIN — QUETIAPINE FUMARATE 600 MG: 300 TABLET ORAL at 22:06

## 2018-06-09 RX ADMIN — SUCRALFATE 1 G: 1 TABLET ORAL at 22:06

## 2018-06-09 RX ADMIN — VALSARTAN 160 MG: 160 TABLET ORAL at 16:24

## 2018-06-09 RX ADMIN — SODIUM CHLORIDE: 9 INJECTION, SOLUTION INTRAVENOUS at 22:06

## 2018-06-09 RX ADMIN — ENOXAPARIN SODIUM 40 MG: 100 INJECTION SUBCUTANEOUS at 16:23

## 2018-06-09 RX ADMIN — CETIRIZINE HYDROCHLORIDE 10 MG: 10 TABLET ORAL at 08:29

## 2018-06-09 RX ADMIN — SUCRALFATE 1 G: 1 TABLET ORAL at 16:24

## 2018-06-09 RX ADMIN — MECLIZINE 12.5 MG: 12.5 TABLET ORAL at 05:22

## 2018-06-09 RX ADMIN — MECLIZINE 12.5 MG: 12.5 TABLET ORAL at 22:06

## 2018-06-09 RX ADMIN — SODIUM CHLORIDE: 9 INJECTION, SOLUTION INTRAVENOUS at 05:22

## 2018-06-09 RX ADMIN — PANTOPRAZOLE SODIUM 40 MG: 40 INJECTION, POWDER, FOR SOLUTION INTRAVENOUS at 16:24

## 2018-06-09 RX ADMIN — FERROUS SULFATE TAB 325 MG (65 MG ELEMENTAL FE) 325 MG: 325 (65 FE) TAB at 16:24

## 2018-06-09 RX ADMIN — ASPIRIN 81 MG 81 MG: 81 TABLET ORAL at 16:33

## 2018-06-09 RX ADMIN — Medication 3 MG: at 22:06

## 2018-06-09 ASSESSMENT — PAIN SCALES - GENERAL: PAINLEVEL_OUTOF10: 0

## 2018-06-10 PROBLEM — K92.2 GI BLEED: Status: ACTIVE | Noted: 2018-06-10

## 2018-06-10 PROBLEM — R19.5 HEME POSITIVE STOOL: Status: ACTIVE | Noted: 2018-06-10

## 2018-06-10 LAB
ABO/RH: NORMAL
ANTIBODY SCREEN: NORMAL
BLOOD BANK DISPENSE STATUS: NORMAL
BLOOD BANK PRODUCT CODE: NORMAL
BPU ID: NORMAL
DESCRIPTION BLOOD BANK: NORMAL
HCT VFR BLD CALC: 24.6 % (ref 37–47)
HCT VFR BLD CALC: 25.8 % (ref 37–47)
HEMOGLOBIN: 7.3 G/DL (ref 12–16)
HEMOGLOBIN: 7.5 G/DL (ref 12–16)
OCCULT BLOOD QC: ABNORMAL
OCCULT BLOOD SCREENING: ABNORMAL

## 2018-06-10 PROCEDURE — 6370000000 HC RX 637 (ALT 250 FOR IP): Performed by: FAMILY MEDICINE

## 2018-06-10 PROCEDURE — 36430 TRANSFUSION BLD/BLD COMPNT: CPT

## 2018-06-10 PROCEDURE — 6370000000 HC RX 637 (ALT 250 FOR IP): Performed by: INTERNAL MEDICINE

## 2018-06-10 PROCEDURE — P9016 RBC LEUKOCYTES REDUCED: HCPCS

## 2018-06-10 PROCEDURE — 2580000003 HC RX 258: Performed by: FAMILY MEDICINE

## 2018-06-10 PROCEDURE — 6360000002 HC RX W HCPCS: Performed by: INTERNAL MEDICINE

## 2018-06-10 PROCEDURE — 1210000000 HC MED SURG R&B

## 2018-06-10 PROCEDURE — 86850 RBC ANTIBODY SCREEN: CPT

## 2018-06-10 PROCEDURE — 85018 HEMOGLOBIN: CPT

## 2018-06-10 PROCEDURE — 99253 IP/OBS CNSLTJ NEW/EST LOW 45: CPT | Performed by: INTERNAL MEDICINE

## 2018-06-10 PROCEDURE — 2580000003 HC RX 258: Performed by: INTERNAL MEDICINE

## 2018-06-10 PROCEDURE — 86900 BLOOD TYPING SEROLOGIC ABO: CPT

## 2018-06-10 PROCEDURE — 36415 COLL VENOUS BLD VENIPUNCTURE: CPT

## 2018-06-10 PROCEDURE — C9113 INJ PANTOPRAZOLE SODIUM, VIA: HCPCS | Performed by: INTERNAL MEDICINE

## 2018-06-10 PROCEDURE — 86901 BLOOD TYPING SEROLOGIC RH(D): CPT

## 2018-06-10 PROCEDURE — 85014 HEMATOCRIT: CPT

## 2018-06-10 RX ORDER — POLYETHYLENE GLYCOL 3350, SODIUM CHLORIDE, SODIUM BICARBONATE, POTASSIUM CHLORIDE 420; 11.2; 5.72; 1.48 G/4L; G/4L; G/4L; G/4L
4000 POWDER, FOR SOLUTION ORAL ONCE
Status: COMPLETED | OUTPATIENT
Start: 2018-06-10 | End: 2018-06-10

## 2018-06-10 RX ORDER — 0.9 % SODIUM CHLORIDE 0.9 %
250 INTRAVENOUS SOLUTION INTRAVENOUS ONCE
Status: COMPLETED | OUTPATIENT
Start: 2018-06-10 | End: 2018-06-10

## 2018-06-10 RX ADMIN — Medication 3 MG: at 21:20

## 2018-06-10 RX ADMIN — SUCRALFATE 1 G: 1 TABLET ORAL at 08:07

## 2018-06-10 RX ADMIN — ASPIRIN 81 MG 81 MG: 81 TABLET ORAL at 08:07

## 2018-06-10 RX ADMIN — SUCRALFATE 1 G: 1 TABLET ORAL at 12:45

## 2018-06-10 RX ADMIN — FERROUS SULFATE TAB 325 MG (65 MG ELEMENTAL FE) 325 MG: 325 (65 FE) TAB at 08:06

## 2018-06-10 RX ADMIN — SUCRALFATE 1 G: 1 TABLET ORAL at 17:24

## 2018-06-10 RX ADMIN — SODIUM CHLORIDE 250 ML: 9 INJECTION, SOLUTION INTRAVENOUS at 15:48

## 2018-06-10 RX ADMIN — SODIUM CHLORIDE: 9 INJECTION, SOLUTION INTRAVENOUS at 06:17

## 2018-06-10 RX ADMIN — MECLIZINE 12.5 MG: 12.5 TABLET ORAL at 06:17

## 2018-06-10 RX ADMIN — SODIUM CHLORIDE 8 MG/HR: 9 INJECTION, SOLUTION INTRAVENOUS at 12:46

## 2018-06-10 RX ADMIN — FERROUS SULFATE TAB 325 MG (65 MG ELEMENTAL FE) 325 MG: 325 (65 FE) TAB at 17:24

## 2018-06-10 RX ADMIN — LEVOTHYROXINE SODIUM 50 MCG: 50 TABLET ORAL at 06:17

## 2018-06-10 RX ADMIN — POLYETHYLENE GLYCOL-3350, SODIUM CHLORIDE, POTASSIUM CHLORIDE AND SODIUM BICARBONATE 4000 ML: 420; 11.2; 5.72; 1.48 POWDER, FOR SOLUTION ORAL at 16:24

## 2018-06-10 RX ADMIN — FERROUS SULFATE TAB 325 MG (65 MG ELEMENTAL FE) 325 MG: 325 (65 FE) TAB at 12:45

## 2018-06-10 RX ADMIN — ALPRAZOLAM 0.5 MG: 0.5 TABLET ORAL at 01:01

## 2018-06-10 RX ADMIN — PANTOPRAZOLE SODIUM 40 MG: 40 INJECTION, POWDER, FOR SOLUTION INTRAVENOUS at 08:06

## 2018-06-10 RX ADMIN — QUETIAPINE FUMARATE 600 MG: 300 TABLET ORAL at 21:20

## 2018-06-10 RX ADMIN — CETIRIZINE HYDROCHLORIDE 10 MG: 10 TABLET ORAL at 08:07

## 2018-06-10 RX ADMIN — SUCRALFATE 1 G: 1 TABLET ORAL at 21:20

## 2018-06-10 ASSESSMENT — ENCOUNTER SYMPTOMS
SORE THROAT: 0
ABDOMINAL PAIN: 0
TROUBLE SWALLOWING: 0
NAUSEA: 0
RECTAL PAIN: 0
DIARRHEA: 0
ABDOMINAL DISTENTION: 0
COUGH: 0
BLOOD IN STOOL: 0
SHORTNESS OF BREATH: 1
VOMITING: 0
CONSTIPATION: 0
ANAL BLEEDING: 1

## 2018-06-10 ASSESSMENT — PAIN SCALES - GENERAL: PAINLEVEL_OUTOF10: 5

## 2018-06-11 ENCOUNTER — ANESTHESIA EVENT (OUTPATIENT)
Dept: ENDOSCOPY | Age: 53
DRG: 378 | End: 2018-06-11
Payer: COMMERCIAL

## 2018-06-11 ENCOUNTER — ANESTHESIA (OUTPATIENT)
Dept: ENDOSCOPY | Age: 53
DRG: 378 | End: 2018-06-11
Payer: COMMERCIAL

## 2018-06-11 VITALS
SYSTOLIC BLOOD PRESSURE: 119 MMHG | OXYGEN SATURATION: 97 % | RESPIRATION RATE: 14 BRPM | DIASTOLIC BLOOD PRESSURE: 56 MMHG

## 2018-06-11 LAB
ANION GAP SERPL CALCULATED.3IONS-SCNC: 11 MMOL/L (ref 7–19)
BUN BLDV-MCNC: 11 MG/DL (ref 6–20)
CALCIUM SERPL-MCNC: 8 MG/DL (ref 8.6–10)
CHLORIDE BLD-SCNC: 106 MMOL/L (ref 98–111)
CO2: 25 MMOL/L (ref 22–29)
CREAT SERPL-MCNC: 0.7 MG/DL (ref 0.5–0.9)
GFR NON-AFRICAN AMERICAN: >60
GLUCOSE BLD-MCNC: 101 MG/DL (ref 74–109)
HCT VFR BLD CALC: 28.7 % (ref 37–47)
HCT VFR BLD CALC: 29.2 % (ref 37–47)
HEMOGLOBIN: 8.6 G/DL (ref 12–16)
HEMOGLOBIN: 8.7 G/DL (ref 12–16)
POTASSIUM SERPL-SCNC: 3.3 MMOL/L (ref 3.5–5)
SODIUM BLD-SCNC: 142 MMOL/L (ref 136–145)
URINE CULTURE, ROUTINE: NORMAL

## 2018-06-11 PROCEDURE — 36415 COLL VENOUS BLD VENIPUNCTURE: CPT

## 2018-06-11 PROCEDURE — 0DJD8ZZ INSPECTION OF LOWER INTESTINAL TRACT, VIA NATURAL OR ARTIFICIAL OPENING ENDOSCOPIC: ICD-10-PCS | Performed by: INTERNAL MEDICINE

## 2018-06-11 PROCEDURE — 6370000000 HC RX 637 (ALT 250 FOR IP): Performed by: FAMILY MEDICINE

## 2018-06-11 PROCEDURE — C9113 INJ PANTOPRAZOLE SODIUM, VIA: HCPCS | Performed by: INTERNAL MEDICINE

## 2018-06-11 PROCEDURE — 3609009500 HC COLONOSCOPY DIAGNOSTIC OR SCREENING: Performed by: INTERNAL MEDICINE

## 2018-06-11 PROCEDURE — 2580000003 HC RX 258: Performed by: FAMILY MEDICINE

## 2018-06-11 PROCEDURE — 80048 BASIC METABOLIC PNL TOTAL CA: CPT

## 2018-06-11 PROCEDURE — 3700000001 HC ADD 15 MINUTES (ANESTHESIA): Performed by: INTERNAL MEDICINE

## 2018-06-11 PROCEDURE — 1210000000 HC MED SURG R&B

## 2018-06-11 PROCEDURE — 85018 HEMOGLOBIN: CPT

## 2018-06-11 PROCEDURE — 43239 EGD BIOPSY SINGLE/MULTIPLE: CPT | Performed by: INTERNAL MEDICINE

## 2018-06-11 PROCEDURE — 3700000000 HC ANESTHESIA ATTENDED CARE: Performed by: INTERNAL MEDICINE

## 2018-06-11 PROCEDURE — 85014 HEMATOCRIT: CPT

## 2018-06-11 PROCEDURE — 6370000000 HC RX 637 (ALT 250 FOR IP): Performed by: INTERNAL MEDICINE

## 2018-06-11 PROCEDURE — 7100000001 HC PACU RECOVERY - ADDTL 15 MIN: Performed by: INTERNAL MEDICINE

## 2018-06-11 PROCEDURE — 6360000002 HC RX W HCPCS: Performed by: FAMILY MEDICINE

## 2018-06-11 PROCEDURE — 6360000002 HC RX W HCPCS: Performed by: INTERNAL MEDICINE

## 2018-06-11 PROCEDURE — 2580000003 HC RX 258: Performed by: ANESTHESIOLOGY

## 2018-06-11 PROCEDURE — 2500000003 HC RX 250 WO HCPCS: Performed by: NURSE ANESTHETIST, CERTIFIED REGISTERED

## 2018-06-11 PROCEDURE — 0DB78ZX EXCISION OF STOMACH, PYLORUS, VIA NATURAL OR ARTIFICIAL OPENING ENDOSCOPIC, DIAGNOSTIC: ICD-10-PCS | Performed by: INTERNAL MEDICINE

## 2018-06-11 PROCEDURE — 2580000003 HC RX 258: Performed by: NURSE ANESTHETIST, CERTIFIED REGISTERED

## 2018-06-11 PROCEDURE — 45378 DIAGNOSTIC COLONOSCOPY: CPT | Performed by: INTERNAL MEDICINE

## 2018-06-11 PROCEDURE — 6360000002 HC RX W HCPCS: Performed by: NURSE ANESTHETIST, CERTIFIED REGISTERED

## 2018-06-11 PROCEDURE — 7100000000 HC PACU RECOVERY - FIRST 15 MIN: Performed by: INTERNAL MEDICINE

## 2018-06-11 PROCEDURE — 3609012800 HC EGD DIAGNOSTIC ONLY: Performed by: INTERNAL MEDICINE

## 2018-06-11 PROCEDURE — 87077 CULTURE AEROBIC IDENTIFY: CPT

## 2018-06-11 PROCEDURE — 2580000003 HC RX 258: Performed by: INTERNAL MEDICINE

## 2018-06-11 RX ORDER — PROPOFOL 10 MG/ML
INJECTION, EMULSION INTRAVENOUS PRN
Status: DISCONTINUED | OUTPATIENT
Start: 2018-06-11 | End: 2018-06-11 | Stop reason: SDUPTHER

## 2018-06-11 RX ORDER — PROMETHAZINE HYDROCHLORIDE 25 MG/ML
6.25 INJECTION, SOLUTION INTRAMUSCULAR; INTRAVENOUS
Status: DISCONTINUED | OUTPATIENT
Start: 2018-06-11 | End: 2018-06-11

## 2018-06-11 RX ORDER — ONDANSETRON 2 MG/ML
4 INJECTION INTRAMUSCULAR; INTRAVENOUS
Status: DISCONTINUED | OUTPATIENT
Start: 2018-06-11 | End: 2018-06-11

## 2018-06-11 RX ORDER — SODIUM CHLORIDE, SODIUM LACTATE, POTASSIUM CHLORIDE, CALCIUM CHLORIDE 600; 310; 30; 20 MG/100ML; MG/100ML; MG/100ML; MG/100ML
INJECTION, SOLUTION INTRAVENOUS CONTINUOUS PRN
Status: DISCONTINUED | OUTPATIENT
Start: 2018-06-11 | End: 2018-06-11 | Stop reason: SDUPTHER

## 2018-06-11 RX ORDER — MIDAZOLAM HYDROCHLORIDE 1 MG/ML
INJECTION INTRAMUSCULAR; INTRAVENOUS PRN
Status: DISCONTINUED | OUTPATIENT
Start: 2018-06-11 | End: 2018-06-11 | Stop reason: SDUPTHER

## 2018-06-11 RX ORDER — DIPHENHYDRAMINE HYDROCHLORIDE 50 MG/ML
12.5 INJECTION INTRAMUSCULAR; INTRAVENOUS
Status: DISCONTINUED | OUTPATIENT
Start: 2018-06-11 | End: 2018-06-11

## 2018-06-11 RX ORDER — ACETAMINOPHEN 325 MG/1
650 TABLET ORAL EVERY 4 HOURS PRN
Status: DISCONTINUED | OUTPATIENT
Start: 2018-06-11 | End: 2018-06-15 | Stop reason: HOSPADM

## 2018-06-11 RX ORDER — SODIUM CHLORIDE, SODIUM LACTATE, POTASSIUM CHLORIDE, CALCIUM CHLORIDE 600; 310; 30; 20 MG/100ML; MG/100ML; MG/100ML; MG/100ML
INJECTION, SOLUTION INTRAVENOUS ONCE
Status: COMPLETED | OUTPATIENT
Start: 2018-06-11 | End: 2018-06-11

## 2018-06-11 RX ORDER — LIDOCAINE HYDROCHLORIDE 10 MG/ML
INJECTION, SOLUTION EPIDURAL; INFILTRATION; INTRACAUDAL; PERINEURAL PRN
Status: DISCONTINUED | OUTPATIENT
Start: 2018-06-11 | End: 2018-06-11 | Stop reason: SDUPTHER

## 2018-06-11 RX ADMIN — SUCRALFATE 1 G: 1 TABLET ORAL at 08:15

## 2018-06-11 RX ADMIN — FERROUS SULFATE TAB 325 MG (65 MG ELEMENTAL FE) 325 MG: 325 (65 FE) TAB at 08:16

## 2018-06-11 RX ADMIN — CETIRIZINE HYDROCHLORIDE 10 MG: 10 TABLET ORAL at 08:15

## 2018-06-11 RX ADMIN — SODIUM CHLORIDE 8 MG/HR: 9 INJECTION, SOLUTION INTRAVENOUS at 01:46

## 2018-06-11 RX ADMIN — PROPOFOL 50 MG: 10 INJECTION, EMULSION INTRAVENOUS at 11:15

## 2018-06-11 RX ADMIN — ALPRAZOLAM 0.5 MG: 0.5 TABLET ORAL at 21:07

## 2018-06-11 RX ADMIN — Medication 3 MG: at 21:07

## 2018-06-11 RX ADMIN — LIDOCAINE HYDROCHLORIDE 5 ML: 10 INJECTION, SOLUTION EPIDURAL; INFILTRATION; INTRACAUDAL; PERINEURAL at 10:51

## 2018-06-11 RX ADMIN — ACETAMINOPHEN 650 MG: 325 TABLET ORAL at 16:40

## 2018-06-11 RX ADMIN — SODIUM CHLORIDE 8 MG/HR: 9 INJECTION, SOLUTION INTRAVENOUS at 19:56

## 2018-06-11 RX ADMIN — SODIUM CHLORIDE, SODIUM LACTATE, POTASSIUM CHLORIDE, AND CALCIUM CHLORIDE: 600; 310; 30; 20 INJECTION, SOLUTION INTRAVENOUS at 10:44

## 2018-06-11 RX ADMIN — MIDAZOLAM HYDROCHLORIDE 2 MG: 1 INJECTION, SOLUTION INTRAMUSCULAR; INTRAVENOUS at 10:51

## 2018-06-11 RX ADMIN — SODIUM CHLORIDE, POTASSIUM CHLORIDE, SODIUM LACTATE AND CALCIUM CHLORIDE: 600; 310; 30; 20 INJECTION, SOLUTION INTRAVENOUS at 10:21

## 2018-06-11 RX ADMIN — ENOXAPARIN SODIUM 40 MG: 100 INJECTION SUBCUTANEOUS at 14:36

## 2018-06-11 RX ADMIN — PROPOFOL 200 MG: 10 INJECTION, EMULSION INTRAVENOUS at 10:51

## 2018-06-11 RX ADMIN — SODIUM CHLORIDE: 9 INJECTION, SOLUTION INTRAVENOUS at 08:16

## 2018-06-11 RX ADMIN — PROPOFOL 200 MG: 10 INJECTION, EMULSION INTRAVENOUS at 11:12

## 2018-06-11 RX ADMIN — QUETIAPINE FUMARATE 600 MG: 300 TABLET ORAL at 21:08

## 2018-06-11 RX ADMIN — SUCRALFATE 1 G: 1 TABLET ORAL at 21:07

## 2018-06-11 ASSESSMENT — LIFESTYLE VARIABLES: SMOKING_STATUS: 0

## 2018-06-11 ASSESSMENT — PAIN SCALES - GENERAL
PAINLEVEL_OUTOF10: 0
PAINLEVEL_OUTOF10: 6
PAINLEVEL_OUTOF10: 0
PAINLEVEL_OUTOF10: 0

## 2018-06-12 LAB
ANION GAP SERPL CALCULATED.3IONS-SCNC: 11 MMOL/L (ref 7–19)
BUN BLDV-MCNC: 8 MG/DL (ref 6–20)
CALCIUM SERPL-MCNC: 8.1 MG/DL (ref 8.6–10)
CHLORIDE BLD-SCNC: 109 MMOL/L (ref 98–111)
CLOTEST: NEGATIVE
CO2: 26 MMOL/L (ref 22–29)
CREAT SERPL-MCNC: 0.9 MG/DL (ref 0.5–0.9)
GFR NON-AFRICAN AMERICAN: >60
GLUCOSE BLD-MCNC: 118 MG/DL (ref 74–109)
HCT VFR BLD CALC: 28.7 % (ref 37–47)
HEMOGLOBIN: 8.5 G/DL (ref 12–16)
MCH RBC QN AUTO: 24.2 PG (ref 27–31)
MCHC RBC AUTO-ENTMCNC: 29.6 G/DL (ref 33–37)
MCV RBC AUTO: 81.8 FL (ref 81–99)
PDW BLD-RTO: 16.7 % (ref 11.5–14.5)
PLATELET # BLD: 267 K/UL (ref 130–400)
PMV BLD AUTO: 10 FL (ref 9.4–12.3)
POTASSIUM SERPL-SCNC: 3.5 MMOL/L (ref 3.5–5)
RBC # BLD: 3.51 M/UL (ref 4.2–5.4)
SODIUM BLD-SCNC: 146 MMOL/L (ref 136–145)
WBC # BLD: 5.4 K/UL (ref 4.8–10.8)

## 2018-06-12 PROCEDURE — 80048 BASIC METABOLIC PNL TOTAL CA: CPT

## 2018-06-12 PROCEDURE — 2580000003 HC RX 258: Performed by: INTERNAL MEDICINE

## 2018-06-12 PROCEDURE — 36415 COLL VENOUS BLD VENIPUNCTURE: CPT

## 2018-06-12 PROCEDURE — 1210000000 HC MED SURG R&B

## 2018-06-12 PROCEDURE — 6360000002 HC RX W HCPCS: Performed by: INTERNAL MEDICINE

## 2018-06-12 PROCEDURE — 85027 COMPLETE CBC AUTOMATED: CPT

## 2018-06-12 PROCEDURE — C9113 INJ PANTOPRAZOLE SODIUM, VIA: HCPCS | Performed by: INTERNAL MEDICINE

## 2018-06-12 PROCEDURE — 6360000002 HC RX W HCPCS: Performed by: FAMILY MEDICINE

## 2018-06-12 PROCEDURE — 6370000000 HC RX 637 (ALT 250 FOR IP): Performed by: INTERNAL MEDICINE

## 2018-06-12 PROCEDURE — 6370000000 HC RX 637 (ALT 250 FOR IP): Performed by: FAMILY MEDICINE

## 2018-06-12 RX ORDER — MORPHINE SULFATE 1 MG/ML
1 INJECTION, SOLUTION EPIDURAL; INTRATHECAL; INTRAVENOUS ONCE
Status: COMPLETED | OUTPATIENT
Start: 2018-06-12 | End: 2018-06-12

## 2018-06-12 RX ADMIN — IRON SUCROSE 300 MG: 20 INJECTION, SOLUTION INTRAVENOUS at 19:55

## 2018-06-12 RX ADMIN — CETIRIZINE HYDROCHLORIDE 10 MG: 10 TABLET ORAL at 08:56

## 2018-06-12 RX ADMIN — Medication 1 MG: at 17:12

## 2018-06-12 RX ADMIN — Medication 3 MG: at 22:03

## 2018-06-12 RX ADMIN — VALSARTAN 160 MG: 160 TABLET ORAL at 08:56

## 2018-06-12 RX ADMIN — SUCRALFATE 1 G: 1 TABLET ORAL at 17:13

## 2018-06-12 RX ADMIN — SODIUM CHLORIDE 8 MG/HR: 9 INJECTION, SOLUTION INTRAVENOUS at 08:55

## 2018-06-12 RX ADMIN — QUETIAPINE FUMARATE 600 MG: 300 TABLET ORAL at 19:55

## 2018-06-12 RX ADMIN — ACETAMINOPHEN 650 MG: 325 TABLET ORAL at 10:42

## 2018-06-12 RX ADMIN — SUCRALFATE 1 G: 1 TABLET ORAL at 12:30

## 2018-06-12 RX ADMIN — SUCRALFATE 1 G: 1 TABLET ORAL at 08:57

## 2018-06-12 RX ADMIN — FERROUS SULFATE TAB 325 MG (65 MG ELEMENTAL FE) 325 MG: 325 (65 FE) TAB at 12:30

## 2018-06-12 RX ADMIN — ENOXAPARIN SODIUM 40 MG: 100 INJECTION SUBCUTANEOUS at 14:03

## 2018-06-12 RX ADMIN — FERROUS SULFATE TAB 325 MG (65 MG ELEMENTAL FE) 325 MG: 325 (65 FE) TAB at 08:56

## 2018-06-12 RX ADMIN — SUCRALFATE 1 G: 1 TABLET ORAL at 19:55

## 2018-06-12 RX ADMIN — ALPRAZOLAM 0.5 MG: 0.5 TABLET ORAL at 22:03

## 2018-06-12 RX ADMIN — FERROUS SULFATE TAB 325 MG (65 MG ELEMENTAL FE) 325 MG: 325 (65 FE) TAB at 17:13

## 2018-06-12 RX ADMIN — LEVOTHYROXINE SODIUM 50 MCG: 50 TABLET ORAL at 06:27

## 2018-06-12 ASSESSMENT — PAIN SCALES - GENERAL
PAINLEVEL_OUTOF10: 0
PAINLEVEL_OUTOF10: 0

## 2018-06-13 ENCOUNTER — TELEPHONE (OUTPATIENT)
Dept: GASTROENTEROLOGY | Age: 53
End: 2018-06-13

## 2018-06-13 LAB
BASE EXCESS ARTERIAL: 1.6 MMOL/L (ref -2–2)
CARBOXYHEMOGLOBIN ARTERIAL: 0.9 % (ref 0–5)
D DIMER: 0.54 UG/ML FEU (ref 0–0.48)
HAPTOGLOBIN: 221 MG/DL (ref 30–200)
HCO3 ARTERIAL: 27.2 MMOL/L (ref 22–26)
HCT VFR BLD CALC: 26.4 % (ref 37–47)
HEMOGLOBIN, ART, EXTENDED: 8.4 G/DL (ref 12–16)
HEMOGLOBIN: 7.9 G/DL (ref 12–16)
LACTATE DEHYDROGENASE: 204 U/L (ref 91–215)
MCH RBC QN AUTO: 24.6 PG (ref 27–31)
MCHC RBC AUTO-ENTMCNC: 29.9 G/DL (ref 33–37)
MCV RBC AUTO: 82.2 FL (ref 81–99)
METHEMOGLOBIN ARTERIAL: 1 %
O2 CONTENT ARTERIAL: 11.2 ML/DL
O2 SAT, ARTERIAL: 93.9 %
O2 THERAPY: ABNORMAL
PCO2 ARTERIAL: 47 MMHG (ref 35–45)
PDW BLD-RTO: 16.9 % (ref 11.5–14.5)
PH ARTERIAL: 7.37 (ref 7.35–7.45)
PLATELET # BLD: 254 K/UL (ref 130–400)
PMV BLD AUTO: 10.3 FL (ref 9.4–12.3)
PO2 ARTERIAL: 74 MMHG (ref 80–100)
POTASSIUM, WHOLE BLOOD: 3.2
RBC # BLD: 3.21 M/UL (ref 4.2–5.4)
RETICULOCYTE ABSOLUTE COUNT: 0.09 M/UL (ref 0.03–0.12)
RETICULOCYTE COUNT PCT: 2.74 % (ref 0.5–1.5)
WBC # BLD: 6.2 K/UL (ref 4.8–10.8)

## 2018-06-13 PROCEDURE — 85379 FIBRIN DEGRADATION QUANT: CPT

## 2018-06-13 PROCEDURE — 6360000002 HC RX W HCPCS: Performed by: INTERNAL MEDICINE

## 2018-06-13 PROCEDURE — 2580000003 HC RX 258: Performed by: INTERNAL MEDICINE

## 2018-06-13 PROCEDURE — 36600 WITHDRAWAL OF ARTERIAL BLOOD: CPT

## 2018-06-13 PROCEDURE — 6370000000 HC RX 637 (ALT 250 FOR IP): Performed by: FAMILY MEDICINE

## 2018-06-13 PROCEDURE — 1210000000 HC MED SURG R&B

## 2018-06-13 PROCEDURE — 6370000000 HC RX 637 (ALT 250 FOR IP): Performed by: INTERNAL MEDICINE

## 2018-06-13 PROCEDURE — 82803 BLOOD GASES ANY COMBINATION: CPT

## 2018-06-13 PROCEDURE — 83010 ASSAY OF HAPTOGLOBIN QUANT: CPT

## 2018-06-13 PROCEDURE — 36415 COLL VENOUS BLD VENIPUNCTURE: CPT

## 2018-06-13 PROCEDURE — C9113 INJ PANTOPRAZOLE SODIUM, VIA: HCPCS | Performed by: INTERNAL MEDICINE

## 2018-06-13 PROCEDURE — 84132 ASSAY OF SERUM POTASSIUM: CPT

## 2018-06-13 PROCEDURE — 2580000003 HC RX 258: Performed by: FAMILY MEDICINE

## 2018-06-13 PROCEDURE — 85027 COMPLETE CBC AUTOMATED: CPT

## 2018-06-13 PROCEDURE — 85045 AUTOMATED RETICULOCYTE COUNT: CPT

## 2018-06-13 PROCEDURE — 6360000002 HC RX W HCPCS: Performed by: FAMILY MEDICINE

## 2018-06-13 PROCEDURE — 83615 LACTATE (LD) (LDH) ENZYME: CPT

## 2018-06-13 RX ADMIN — VALSARTAN 160 MG: 160 TABLET ORAL at 08:19

## 2018-06-13 RX ADMIN — FLUTICASONE PROPIONATE 2 SPRAY: 50 SPRAY, METERED NASAL at 08:22

## 2018-06-13 RX ADMIN — CETIRIZINE HYDROCHLORIDE 10 MG: 10 TABLET ORAL at 08:19

## 2018-06-13 RX ADMIN — FERROUS SULFATE TAB 325 MG (65 MG ELEMENTAL FE) 325 MG: 325 (65 FE) TAB at 17:59

## 2018-06-13 RX ADMIN — SODIUM CHLORIDE 8 MG/HR: 9 INJECTION, SOLUTION INTRAVENOUS at 12:02

## 2018-06-13 RX ADMIN — FERROUS SULFATE TAB 325 MG (65 MG ELEMENTAL FE) 325 MG: 325 (65 FE) TAB at 12:38

## 2018-06-13 RX ADMIN — ENOXAPARIN SODIUM 40 MG: 100 INJECTION SUBCUTANEOUS at 14:47

## 2018-06-13 RX ADMIN — SUCRALFATE 1 G: 1 TABLET ORAL at 21:16

## 2018-06-13 RX ADMIN — Medication 3 MG: at 21:16

## 2018-06-13 RX ADMIN — SODIUM CHLORIDE: 9 INJECTION, SOLUTION INTRAVENOUS at 08:28

## 2018-06-13 RX ADMIN — QUETIAPINE FUMARATE 600 MG: 300 TABLET ORAL at 21:16

## 2018-06-13 RX ADMIN — SUCRALFATE 1 G: 1 TABLET ORAL at 14:48

## 2018-06-13 RX ADMIN — SUCRALFATE 1 G: 1 TABLET ORAL at 08:19

## 2018-06-13 RX ADMIN — ALPRAZOLAM 0.5 MG: 0.5 TABLET ORAL at 21:16

## 2018-06-13 RX ADMIN — ACETAMINOPHEN 650 MG: 325 TABLET ORAL at 08:24

## 2018-06-13 RX ADMIN — LEVOTHYROXINE SODIUM 50 MCG: 50 TABLET ORAL at 05:19

## 2018-06-13 RX ADMIN — FERROUS SULFATE TAB 325 MG (65 MG ELEMENTAL FE) 325 MG: 325 (65 FE) TAB at 08:19

## 2018-06-13 RX ADMIN — IRON SUCROSE 400 MG: 20 INJECTION, SOLUTION INTRAVENOUS at 17:59

## 2018-06-13 RX ADMIN — SUCRALFATE 1 G: 1 TABLET ORAL at 17:59

## 2018-06-13 ASSESSMENT — PAIN SCALES - GENERAL
PAINLEVEL_OUTOF10: 8
PAINLEVEL_OUTOF10: 2

## 2018-06-13 ASSESSMENT — ENCOUNTER SYMPTOMS
COUGH: 0
CONSTIPATION: 0
ABDOMINAL PAIN: 0
SINUS PAIN: 0
VOMITING: 0
BACK PAIN: 1
SHORTNESS OF BREATH: 1
EYE PAIN: 0
STRIDOR: 0
SPUTUM PRODUCTION: 0
NAUSEA: 0
EYE REDNESS: 0
SORE THROAT: 0
DIARRHEA: 0
EYE DISCHARGE: 0
WHEEZING: 0
HEARTBURN: 0
ORTHOPNEA: 0

## 2018-06-14 ENCOUNTER — APPOINTMENT (OUTPATIENT)
Dept: NUCLEAR MEDICINE | Age: 53
DRG: 378 | End: 2018-06-14
Attending: FAMILY MEDICINE
Payer: COMMERCIAL

## 2018-06-14 PROCEDURE — 6360000002 HC RX W HCPCS: Performed by: FAMILY MEDICINE

## 2018-06-14 PROCEDURE — 2580000003 HC RX 258: Performed by: FAMILY MEDICINE

## 2018-06-14 PROCEDURE — 3430000000 HC RX DIAGNOSTIC RADIOPHARMACEUTICAL: Performed by: INTERNAL MEDICINE

## 2018-06-14 PROCEDURE — 2580000003 HC RX 258: Performed by: INTERNAL MEDICINE

## 2018-06-14 PROCEDURE — 6360000002 HC RX W HCPCS: Performed by: INTERNAL MEDICINE

## 2018-06-14 PROCEDURE — 6370000000 HC RX 637 (ALT 250 FOR IP): Performed by: INTERNAL MEDICINE

## 2018-06-14 PROCEDURE — 1210000000 HC MED SURG R&B

## 2018-06-14 PROCEDURE — A9540 TC99M MAA: HCPCS | Performed by: INTERNAL MEDICINE

## 2018-06-14 PROCEDURE — C9113 INJ PANTOPRAZOLE SODIUM, VIA: HCPCS | Performed by: INTERNAL MEDICINE

## 2018-06-14 PROCEDURE — 6370000000 HC RX 637 (ALT 250 FOR IP): Performed by: FAMILY MEDICINE

## 2018-06-14 PROCEDURE — 78582 LUNG VENTILAT&PERFUS IMAGING: CPT

## 2018-06-14 PROCEDURE — A9558 XE133 XENON 10MCI: HCPCS | Performed by: INTERNAL MEDICINE

## 2018-06-14 RX ADMIN — VALSARTAN 160 MG: 160 TABLET ORAL at 08:21

## 2018-06-14 RX ADMIN — LEVOTHYROXINE SODIUM 50 MCG: 50 TABLET ORAL at 05:50

## 2018-06-14 RX ADMIN — FERROUS SULFATE TAB 325 MG (65 MG ELEMENTAL FE) 325 MG: 325 (65 FE) TAB at 08:22

## 2018-06-14 RX ADMIN — ALPRAZOLAM 0.5 MG: 0.5 TABLET ORAL at 21:26

## 2018-06-14 RX ADMIN — ACETAMINOPHEN 650 MG: 325 TABLET ORAL at 08:21

## 2018-06-14 RX ADMIN — SUCRALFATE 1 G: 1 TABLET ORAL at 17:39

## 2018-06-14 RX ADMIN — Medication 10 MILLICURIE: at 10:20

## 2018-06-14 RX ADMIN — SUCRALFATE 1 G: 1 TABLET ORAL at 08:22

## 2018-06-14 RX ADMIN — ENOXAPARIN SODIUM 40 MG: 100 INJECTION SUBCUTANEOUS at 14:34

## 2018-06-14 RX ADMIN — SUCRALFATE 1 G: 1 TABLET ORAL at 13:15

## 2018-06-14 RX ADMIN — FERROUS SULFATE TAB 325 MG (65 MG ELEMENTAL FE) 325 MG: 325 (65 FE) TAB at 13:16

## 2018-06-14 RX ADMIN — CETIRIZINE HYDROCHLORIDE 10 MG: 10 TABLET ORAL at 08:22

## 2018-06-14 RX ADMIN — QUETIAPINE FUMARATE 600 MG: 300 TABLET ORAL at 21:26

## 2018-06-14 RX ADMIN — Medication 6 MILLICURIE: at 10:20

## 2018-06-14 RX ADMIN — FERROUS SULFATE TAB 325 MG (65 MG ELEMENTAL FE) 325 MG: 325 (65 FE) TAB at 17:39

## 2018-06-14 RX ADMIN — SUCRALFATE 1 G: 1 TABLET ORAL at 21:26

## 2018-06-14 RX ADMIN — Medication 3 MG: at 21:26

## 2018-06-14 RX ADMIN — IRON SUCROSE 400 MG: 20 INJECTION, SOLUTION INTRAVENOUS at 17:45

## 2018-06-14 RX ADMIN — FLUTICASONE PROPIONATE 2 SPRAY: 50 SPRAY, METERED NASAL at 08:22

## 2018-06-14 RX ADMIN — ACETAMINOPHEN 650 MG: 325 TABLET ORAL at 17:45

## 2018-06-14 RX ADMIN — SODIUM CHLORIDE 8 MG/HR: 9 INJECTION, SOLUTION INTRAVENOUS at 10:45

## 2018-06-14 RX ADMIN — SODIUM CHLORIDE: 9 INJECTION, SOLUTION INTRAVENOUS at 17:46

## 2018-06-14 ASSESSMENT — PAIN SCALES - GENERAL
PAINLEVEL_OUTOF10: 3
PAINLEVEL_OUTOF10: 0
PAINLEVEL_OUTOF10: 3
PAINLEVEL_OUTOF10: 6
PAINLEVEL_OUTOF10: 8

## 2018-06-15 VITALS
HEIGHT: 62 IN | BODY MASS INDEX: 44.9 KG/M2 | RESPIRATION RATE: 18 BRPM | OXYGEN SATURATION: 98 % | TEMPERATURE: 98 F | SYSTOLIC BLOOD PRESSURE: 135 MMHG | WEIGHT: 244 LBS | DIASTOLIC BLOOD PRESSURE: 82 MMHG | HEART RATE: 85 BPM

## 2018-06-15 LAB
ABO/RH: NORMAL
ANTIBODY SCREEN: NORMAL
BASOPHILS ABSOLUTE: 0 K/UL (ref 0–0.2)
BASOPHILS RELATIVE PERCENT: 0.6 % (ref 0–1)
BLOOD BANK DISPENSE STATUS: NORMAL
BLOOD BANK PRODUCT CODE: NORMAL
BPU ID: NORMAL
DESCRIPTION BLOOD BANK: NORMAL
EOSINOPHILS ABSOLUTE: 0.2 K/UL (ref 0–0.6)
EOSINOPHILS RELATIVE PERCENT: 3 % (ref 0–5)
HCT VFR BLD CALC: 25.9 % (ref 37–47)
HCT VFR BLD CALC: 33.1 % (ref 37–47)
HEMOGLOBIN: 10 G/DL (ref 12–16)
HEMOGLOBIN: 7.7 G/DL (ref 12–16)
LYMPHOCYTES ABSOLUTE: 1.3 K/UL (ref 1.1–4.5)
LYMPHOCYTES RELATIVE PERCENT: 20.1 % (ref 20–40)
MCH RBC QN AUTO: 24.6 PG (ref 27–31)
MCH RBC QN AUTO: 25.1 PG (ref 27–31)
MCHC RBC AUTO-ENTMCNC: 29.7 G/DL (ref 33–37)
MCHC RBC AUTO-ENTMCNC: 30.2 G/DL (ref 33–37)
MCV RBC AUTO: 82.7 FL (ref 81–99)
MCV RBC AUTO: 83.2 FL (ref 81–99)
MONOCYTES ABSOLUTE: 0.5 K/UL (ref 0–0.9)
MONOCYTES RELATIVE PERCENT: 8.1 % (ref 0–10)
NEUTROPHILS ABSOLUTE: 4.4 K/UL (ref 1.5–7.5)
NEUTROPHILS RELATIVE PERCENT: 66.4 % (ref 50–65)
PDW BLD-RTO: 18.3 % (ref 11.5–14.5)
PDW BLD-RTO: 18.3 % (ref 11.5–14.5)
PLATELET # BLD: 239 K/UL (ref 130–400)
PLATELET # BLD: 264 K/UL (ref 130–400)
PMV BLD AUTO: 10.1 FL (ref 9.4–12.3)
PMV BLD AUTO: 9.9 FL (ref 9.4–12.3)
RBC # BLD: 3.13 M/UL (ref 4.2–5.4)
RBC # BLD: 3.98 M/UL (ref 4.2–5.4)
WBC # BLD: 5.8 K/UL (ref 4.8–10.8)
WBC # BLD: 6.6 K/UL (ref 4.8–10.8)

## 2018-06-15 PROCEDURE — 86850 RBC ANTIBODY SCREEN: CPT

## 2018-06-15 PROCEDURE — 36430 TRANSFUSION BLD/BLD COMPNT: CPT

## 2018-06-15 PROCEDURE — 85025 COMPLETE CBC W/AUTO DIFF WBC: CPT

## 2018-06-15 PROCEDURE — 6370000000 HC RX 637 (ALT 250 FOR IP): Performed by: INTERNAL MEDICINE

## 2018-06-15 PROCEDURE — 2580000003 HC RX 258: Performed by: NURSE PRACTITIONER

## 2018-06-15 PROCEDURE — P9016 RBC LEUKOCYTES REDUCED: HCPCS

## 2018-06-15 PROCEDURE — 85027 COMPLETE CBC AUTOMATED: CPT

## 2018-06-15 PROCEDURE — 86901 BLOOD TYPING SEROLOGIC RH(D): CPT

## 2018-06-15 PROCEDURE — 6370000000 HC RX 637 (ALT 250 FOR IP): Performed by: FAMILY MEDICINE

## 2018-06-15 PROCEDURE — 36415 COLL VENOUS BLD VENIPUNCTURE: CPT

## 2018-06-15 PROCEDURE — 86900 BLOOD TYPING SEROLOGIC ABO: CPT

## 2018-06-15 RX ORDER — 0.9 % SODIUM CHLORIDE 0.9 %
250 INTRAVENOUS SOLUTION INTRAVENOUS ONCE
Status: COMPLETED | OUTPATIENT
Start: 2018-06-15 | End: 2018-06-15

## 2018-06-15 RX ORDER — FERROUS SULFATE 325(65) MG
325 TABLET ORAL
Qty: 30 TABLET | Refills: 3 | Status: SHIPPED | OUTPATIENT
Start: 2018-06-15

## 2018-06-15 RX ADMIN — FERROUS SULFATE TAB 325 MG (65 MG ELEMENTAL FE) 325 MG: 325 (65 FE) TAB at 11:11

## 2018-06-15 RX ADMIN — SUCRALFATE 1 G: 1 TABLET ORAL at 18:05

## 2018-06-15 RX ADMIN — CETIRIZINE HYDROCHLORIDE 10 MG: 10 TABLET ORAL at 11:11

## 2018-06-15 RX ADMIN — FERROUS SULFATE TAB 325 MG (65 MG ELEMENTAL FE) 325 MG: 325 (65 FE) TAB at 18:05

## 2018-06-15 RX ADMIN — SODIUM CHLORIDE 250 ML: 9 INJECTION, SOLUTION INTRAVENOUS at 11:41

## 2018-06-15 RX ADMIN — VALSARTAN 160 MG: 160 TABLET ORAL at 11:11

## 2018-06-15 RX ADMIN — SUCRALFATE 1 G: 1 TABLET ORAL at 13:27

## 2018-06-15 RX ADMIN — LEVOTHYROXINE SODIUM 50 MCG: 50 TABLET ORAL at 05:44

## 2018-06-25 LAB
ALBUMIN SERPL-MCNC: 4.3 G/DL (ref 3.5–5.2)
ALP BLD-CCNC: 86 U/L (ref 35–104)
ALT SERPL-CCNC: 20 U/L (ref 5–33)
ANION GAP SERPL CALCULATED.3IONS-SCNC: 17 MMOL/L (ref 7–19)
AST SERPL-CCNC: 18 U/L (ref 5–32)
BASOPHILS ABSOLUTE: 0 K/UL (ref 0–0.2)
BASOPHILS RELATIVE PERCENT: 0.5 % (ref 0–1)
BILIRUB SERPL-MCNC: 0.3 MG/DL (ref 0.2–1.2)
BUN BLDV-MCNC: 17 MG/DL (ref 6–20)
CALCIUM SERPL-MCNC: 9.8 MG/DL (ref 8.6–10)
CHLORIDE BLD-SCNC: 96 MMOL/L (ref 98–111)
CO2: 27 MMOL/L (ref 22–29)
CREAT SERPL-MCNC: 0.9 MG/DL (ref 0.5–0.9)
EOSINOPHILS ABSOLUTE: 0.1 K/UL (ref 0–0.6)
EOSINOPHILS RELATIVE PERCENT: 1.8 % (ref 0–5)
GFR NON-AFRICAN AMERICAN: >60
GLUCOSE BLD-MCNC: 139 MG/DL (ref 74–109)
HCT VFR BLD CALC: 41.4 % (ref 37–47)
HEMOGLOBIN: 12.6 G/DL (ref 12–16)
LYMPHOCYTES ABSOLUTE: 1.5 K/UL (ref 1.1–4.5)
LYMPHOCYTES RELATIVE PERCENT: 28 % (ref 20–40)
MCH RBC QN AUTO: 26.1 PG (ref 27–31)
MCHC RBC AUTO-ENTMCNC: 30.4 G/DL (ref 33–37)
MCV RBC AUTO: 85.9 FL (ref 81–99)
MONOCYTES ABSOLUTE: 0.5 K/UL (ref 0–0.9)
MONOCYTES RELATIVE PERCENT: 8.8 % (ref 0–10)
NEUTROPHILS ABSOLUTE: 3.3 K/UL (ref 1.5–7.5)
NEUTROPHILS RELATIVE PERCENT: 60.5 % (ref 50–65)
PDW BLD-RTO: 20.5 % (ref 11.5–14.5)
PLATELET # BLD: 252 K/UL (ref 130–400)
PMV BLD AUTO: 10.5 FL (ref 9.4–12.3)
POTASSIUM SERPL-SCNC: 3.4 MMOL/L (ref 3.5–5)
RBC # BLD: 4.82 M/UL (ref 4.2–5.4)
SODIUM BLD-SCNC: 140 MMOL/L (ref 136–145)
TOTAL PROTEIN: 7.6 G/DL (ref 6.6–8.7)
WBC # BLD: 5.5 K/UL (ref 4.8–10.8)

## 2018-07-02 ENCOUNTER — NURSE ONLY (OUTPATIENT)
Dept: GASTROENTEROLOGY | Age: 53
End: 2018-07-02
Payer: COMMERCIAL

## 2018-07-02 DIAGNOSIS — R19.5 HEME POSITIVE STOOL: ICD-10-CM

## 2018-07-02 LAB
ANION GAP SERPL CALCULATED.3IONS-SCNC: 17 MMOL/L (ref 7–19)
BUN BLDV-MCNC: 16 MG/DL (ref 6–20)
CALCIUM SERPL-MCNC: 10 MG/DL (ref 8.6–10)
CHLORIDE BLD-SCNC: 95 MMOL/L (ref 98–111)
CO2: 29 MMOL/L (ref 22–29)
CREAT SERPL-MCNC: 1 MG/DL (ref 0.5–0.9)
GFR NON-AFRICAN AMERICAN: 58
GLUCOSE BLD-MCNC: 137 MG/DL (ref 74–109)
POTASSIUM SERPL-SCNC: 3.8 MMOL/L (ref 3.5–5)
SODIUM BLD-SCNC: 141 MMOL/L (ref 136–145)

## 2018-07-02 PROCEDURE — 91110 GI TRC IMG INTRAL ESOPH-ILE: CPT | Performed by: INTERNAL MEDICINE

## 2018-07-06 ENCOUNTER — TELEPHONE (OUTPATIENT)
Dept: GASTROENTEROLOGY | Age: 53
End: 2018-07-06

## 2018-07-06 NOTE — PROGRESS NOTES
Patient presented to the office, as scheduled, for M2A pill cam ingestion and equipment placement. Instructions, risks, and benefits were discussed. All questions were answered and patient acknowledged understanding. Consent was signed and dated. The sensor belt was then placed around the mid abdomen and attached the recorder device to it and placed in shoulder holster. The pill cam was then opened and activated (light on recorder blinking blue). The patient successfully swallowed the pill cam with a small amount of water mixed with simethicone drops. When patient returns, the recorder will be uploaded to the computer and the appointed physician will be notified for the report to be read and resulted.      Instructions are as follows:   Clear liquids 2 hours after ingestion of pill cam  Light snack 4 hours after ingestion of pill cam  Avoid any MRI machines  Resume ADL's as normal throughout the day  Watch for Pill cam to be expelled, if not seen in stool further testing may be required   Return to office no later than 4:30pm  Call the office if the blue light stops blinking or all lights disappear [phone]

## 2018-07-17 ENCOUNTER — OFFICE VISIT (OUTPATIENT)
Dept: GASTROENTEROLOGY | Age: 53
End: 2018-07-17
Payer: COMMERCIAL

## 2018-07-17 ENCOUNTER — HOSPITAL ENCOUNTER (OUTPATIENT)
Dept: GENERAL RADIOLOGY | Age: 53
Discharge: HOME | End: 2018-07-17
Payer: COMMERCIAL

## 2018-07-17 VITALS
OXYGEN SATURATION: 95 % | DIASTOLIC BLOOD PRESSURE: 70 MMHG | SYSTOLIC BLOOD PRESSURE: 100 MMHG | WEIGHT: 233.8 LBS | HEART RATE: 96 BPM | BODY MASS INDEX: 41.43 KG/M2 | HEIGHT: 63 IN

## 2018-07-17 DIAGNOSIS — R19.5 HEME POSITIVE STOOL: ICD-10-CM

## 2018-07-17 DIAGNOSIS — R19.5 HEME POSITIVE STOOL: Primary | ICD-10-CM

## 2018-07-17 DIAGNOSIS — Z86.2 HISTORY OF ANEMIA: ICD-10-CM

## 2018-07-17 PROBLEM — K92.2 GI BLEED: Status: RESOLVED | Noted: 2018-06-10 | Resolved: 2018-07-17

## 2018-07-17 PROBLEM — D50.9 IRON DEFICIENCY ANEMIA: Status: RESOLVED | Noted: 2018-06-09 | Resolved: 2018-07-17

## 2018-07-17 PROCEDURE — 74018 RADEX ABDOMEN 1 VIEW: CPT

## 2018-07-17 PROCEDURE — 99213 OFFICE O/P EST LOW 20 MIN: CPT | Performed by: NURSE PRACTITIONER

## 2018-07-17 ASSESSMENT — ENCOUNTER SYMPTOMS
DIARRHEA: 0
CHOKING: 0
BLOOD IN STOOL: 0
NAUSEA: 0
CONSTIPATION: 0
VOMITING: 0
VOICE CHANGE: 0
BACK PAIN: 1
ABDOMINAL DISTENTION: 0
TROUBLE SWALLOWING: 0
COUGH: 0
ABDOMINAL PAIN: 0
RECTAL PAIN: 0
SHORTNESS OF BREATH: 0

## 2018-07-17 NOTE — PROGRESS NOTES
ALLERGY) 10 MG tablet Take 10 mg by mouth daily      Calcium Carb-Cholecalciferol (CALCIUM 600+D) 600-800 MG-UNIT TABS Take by mouth daily      fluticasone (FLONASE) 50 MCG/ACT nasal spray 2 sprays by Nasal route daily 1 Bottle 0    omeprazole (PRILOSEC) 40 MG capsule Take 20 mg by mouth daily       valsartan (DIOVAN) 160 MG tablet Take 160 mg by mouth daily      QUEtiapine (SEROQUEL) 300 MG tablet Take 600 mg by mouth nightly      levothyroxine (SYNTHROID) 50 MCG tablet Take 50 mcg by mouth Daily      ALPRAZolam (XANAX) 0.5 MG tablet Take 0.5 mg by mouth nightly as needed for Sleep       No current facility-administered medications for this visit. No Known Allergies     reports that she has never smoked. She has never used smokeless tobacco. She reports that she does not drink alcohol or use drugs. Review of Systems   Constitutional: Negative for appetite change, fever and unexpected weight change. HENT: Negative for trouble swallowing and voice change. Respiratory: Negative for cough, choking and shortness of breath. Cardiovascular: Negative for chest pain and palpitations. Gastrointestinal: Negative for abdominal distention, abdominal pain, blood in stool, constipation, diarrhea, nausea, rectal pain and vomiting. Musculoskeletal: Positive for back pain. Negative for arthralgias and joint swelling. Skin: Negative for pallor, rash and wound. Neurological: Negative for weakness and light-headedness. Hematological: Negative for adenopathy. Does not bruise/bleed easily. Objective:   Physical Exam   Constitutional: She is oriented to person, place, and time. She appears well-developed and well-nourished. No distress.    /70 (Site: Left Arm, Position: Sitting, Cuff Size: Large Adult)   Pulse 96   Ht 5' 3\" (1.6 m)   Wt 233 lb 12.8 oz (106.1 kg)   SpO2 95%   BMI 41.42 kg/m²    HENT:   Mouth/Throat: Mucous membranes are normal.   Cardiovascular: Normal rate and regular rhythm. No murmur heard. Pulmonary/Chest: Effort normal and breath sounds normal. No respiratory distress. Abdominal: Soft. Normal appearance and bowel sounds are normal. She exhibits no distension and no mass. There is no tenderness. There is no rebound and no guarding. Neurological: She is alert and oriented to person, place, and time. Skin: Skin is warm, dry and intact. No pallor. Assessment:      1. Heme positive stool    2. History of anemia          Plan:      kub today to check if capsule remains  Orders Placed This Encounter   Procedures    XR ABDOMEN (KUB) (SINGLE AP VIEW)     Standing Status:   Future     Standing Expiration Date:   7/17/2019     Order Specific Question:   Reason for exam:     Answer:   possible retained pill camera in small bowel     Will notify patient per mychart    Repeat screening colonoscopy  5 yr, due 6/2023    RTO prn problems    Symptoms CRC reviewed.    High fiber diet recommended    Prescriptions per PCP

## 2018-07-17 NOTE — PROGRESS NOTES
Medication list reviewed and updated. This was confirmed with the patient by verbally consulting with them, no list of medication bottles provided.

## 2018-08-20 LAB
ALBUMIN SERPL-MCNC: 4.1 G/DL (ref 3.5–5.2)
ALP BLD-CCNC: 86 U/L (ref 35–104)
ALT SERPL-CCNC: 21 U/L (ref 5–33)
ANION GAP SERPL CALCULATED.3IONS-SCNC: 18 MMOL/L (ref 7–19)
AST SERPL-CCNC: 17 U/L (ref 5–32)
BASOPHILS ABSOLUTE: 0 K/UL (ref 0–0.2)
BASOPHILS RELATIVE PERCENT: 0.4 % (ref 0–1)
BILIRUB SERPL-MCNC: 0.3 MG/DL (ref 0.2–1.2)
BUN BLDV-MCNC: 14 MG/DL (ref 6–20)
CALCIUM SERPL-MCNC: 9.6 MG/DL (ref 8.6–10)
CHLORIDE BLD-SCNC: 99 MMOL/L (ref 98–111)
CO2: 27 MMOL/L (ref 22–29)
CREAT SERPL-MCNC: 0.8 MG/DL (ref 0.5–0.9)
EOSINOPHILS ABSOLUTE: 0.2 K/UL (ref 0–0.6)
EOSINOPHILS RELATIVE PERCENT: 2.4 % (ref 0–5)
GFR NON-AFRICAN AMERICAN: >60
GLUCOSE BLD-MCNC: 132 MG/DL (ref 74–109)
HCT VFR BLD CALC: 37.2 % (ref 37–47)
HEMOGLOBIN: 11.9 G/DL (ref 12–16)
LYMPHOCYTES ABSOLUTE: 1.6 K/UL (ref 1.1–4.5)
LYMPHOCYTES RELATIVE PERCENT: 23.6 % (ref 20–40)
MCH RBC QN AUTO: 27.4 PG (ref 27–31)
MCHC RBC AUTO-ENTMCNC: 32 G/DL (ref 33–37)
MCV RBC AUTO: 85.5 FL (ref 81–99)
MONOCYTES ABSOLUTE: 0.4 K/UL (ref 0–0.9)
MONOCYTES RELATIVE PERCENT: 6.4 % (ref 0–10)
NEUTROPHILS ABSOLUTE: 4.5 K/UL (ref 1.5–7.5)
NEUTROPHILS RELATIVE PERCENT: 66.8 % (ref 50–65)
PDW BLD-RTO: 19.2 % (ref 11.5–14.5)
PLATELET # BLD: 216 K/UL (ref 130–400)
PMV BLD AUTO: 10 FL (ref 9.4–12.3)
POTASSIUM SERPL-SCNC: 3.5 MMOL/L (ref 3.5–5)
RBC # BLD: 4.35 M/UL (ref 4.2–5.4)
SODIUM BLD-SCNC: 144 MMOL/L (ref 136–145)
TOTAL PROTEIN: 7.1 G/DL (ref 6.6–8.7)
WBC # BLD: 6.7 K/UL (ref 4.8–10.8)

## 2018-11-01 LAB
BASOPHILS ABSOLUTE: 0 K/UL (ref 0–0.2)
BASOPHILS RELATIVE PERCENT: 0.4 % (ref 0–1)
EOSINOPHILS ABSOLUTE: 0.1 K/UL (ref 0–0.6)
EOSINOPHILS RELATIVE PERCENT: 1.6 % (ref 0–5)
HCT VFR BLD CALC: 37.5 % (ref 37–47)
HEMOGLOBIN: 12.6 G/DL (ref 12–16)
IRON: 72 UG/DL (ref 37–145)
LYMPHOCYTES ABSOLUTE: 2 K/UL (ref 1.1–4.5)
LYMPHOCYTES RELATIVE PERCENT: 28 % (ref 20–40)
MCH RBC QN AUTO: 30.4 PG (ref 27–31)
MCHC RBC AUTO-ENTMCNC: 33.6 G/DL (ref 33–37)
MCV RBC AUTO: 90.4 FL (ref 81–99)
MONOCYTES ABSOLUTE: 0.5 K/UL (ref 0–0.9)
MONOCYTES RELATIVE PERCENT: 6.2 % (ref 0–10)
NEUTROPHILS ABSOLUTE: 4.6 K/UL (ref 1.5–7.5)
NEUTROPHILS RELATIVE PERCENT: 63.4 % (ref 50–65)
PDW BLD-RTO: 13 % (ref 11.5–14.5)
PLATELET # BLD: 231 K/UL (ref 130–400)
PMV BLD AUTO: 9.9 FL (ref 9.4–12.3)
RBC # BLD: 4.15 M/UL (ref 4.2–5.4)
TOTAL IRON BINDING CAPACITY: 253 UG/DL (ref 250–400)
WBC # BLD: 7.3 K/UL (ref 4.8–10.8)

## 2019-02-05 ENCOUNTER — OFFICE VISIT (OUTPATIENT)
Dept: URGENT CARE | Age: 54
End: 2019-02-05
Payer: COMMERCIAL

## 2019-02-05 VITALS
TEMPERATURE: 98.8 F | WEIGHT: 235 LBS | DIASTOLIC BLOOD PRESSURE: 89 MMHG | HEART RATE: 101 BPM | OXYGEN SATURATION: 98 % | SYSTOLIC BLOOD PRESSURE: 131 MMHG | RESPIRATION RATE: 16 BRPM | HEIGHT: 63 IN | BODY MASS INDEX: 41.64 KG/M2

## 2019-02-05 DIAGNOSIS — R50.9 FEVER, UNSPECIFIED FEVER CAUSE: ICD-10-CM

## 2019-02-05 DIAGNOSIS — R05.9 COUGH: ICD-10-CM

## 2019-02-05 DIAGNOSIS — J06.9 URI WITH COUGH AND CONGESTION: Primary | ICD-10-CM

## 2019-02-05 DIAGNOSIS — R11.0 NAUSEA: ICD-10-CM

## 2019-02-05 LAB
INFLUENZA A ANTIBODY: NEGATIVE
INFLUENZA B ANTIBODY: NEGATIVE
S PYO AG THROAT QL: NORMAL

## 2019-02-05 PROCEDURE — 87880 STREP A ASSAY W/OPTIC: CPT | Performed by: NURSE PRACTITIONER

## 2019-02-05 PROCEDURE — 87804 INFLUENZA ASSAY W/OPTIC: CPT | Performed by: NURSE PRACTITIONER

## 2019-02-05 PROCEDURE — 99213 OFFICE O/P EST LOW 20 MIN: CPT | Performed by: NURSE PRACTITIONER

## 2019-02-05 PROCEDURE — 96372 THER/PROPH/DIAG INJ SC/IM: CPT | Performed by: NURSE PRACTITIONER

## 2019-02-05 RX ORDER — IRBESARTAN AND HYDROCHLOROTHIAZIDE 150; 12.5 MG/1; MG/1
1 TABLET, FILM COATED ORAL DAILY
COMMUNITY

## 2019-02-05 RX ORDER — DEXTROMETHORPHAN HYDROBROMIDE AND PROMETHAZINE HYDROCHLORIDE 15; 6.25 MG/5ML; MG/5ML
5 SYRUP ORAL NIGHTLY PRN
Qty: 120 ML | Refills: 0 | Status: SHIPPED | OUTPATIENT
Start: 2019-02-05

## 2019-02-05 RX ORDER — DEXAMETHASONE SODIUM PHOSPHATE 10 MG/ML
10 INJECTION INTRAMUSCULAR; INTRAVENOUS ONCE
Status: COMPLETED | OUTPATIENT
Start: 2019-02-05 | End: 2019-02-05

## 2019-02-05 RX ORDER — ONDANSETRON 4 MG/1
4 TABLET, ORALLY DISINTEGRATING ORAL EVERY 8 HOURS PRN
Qty: 30 TABLET | Refills: 0 | Status: SHIPPED | OUTPATIENT
Start: 2019-02-05

## 2019-02-05 RX ORDER — AZITHROMYCIN 250 MG/1
250 TABLET, FILM COATED ORAL SEE ADMIN INSTRUCTIONS
Qty: 6 TABLET | Refills: 0 | Status: SHIPPED | OUTPATIENT
Start: 2019-02-05 | End: 2019-02-10

## 2019-02-05 RX ORDER — BENZONATATE 100 MG/1
100 CAPSULE ORAL 3 TIMES DAILY PRN
Qty: 21 CAPSULE | Refills: 0 | Status: SHIPPED | OUTPATIENT
Start: 2019-02-05 | End: 2019-02-12

## 2019-02-05 RX ADMIN — DEXAMETHASONE SODIUM PHOSPHATE 10 MG: 10 INJECTION INTRAMUSCULAR; INTRAVENOUS at 16:32

## 2019-02-05 ASSESSMENT — ENCOUNTER SYMPTOMS
RHINORRHEA: 1
SORE THROAT: 1
COUGH: 1

## 2019-05-06 LAB
ALBUMIN SERPL-MCNC: 4.2 G/DL (ref 3.5–5.2)
ALP BLD-CCNC: 82 U/L (ref 35–104)
ALT SERPL-CCNC: 19 U/L (ref 5–33)
ANION GAP SERPL CALCULATED.3IONS-SCNC: 13 MMOL/L (ref 7–19)
AST SERPL-CCNC: 17 U/L (ref 5–32)
BASOPHILS ABSOLUTE: 0 K/UL (ref 0–0.2)
BASOPHILS RELATIVE PERCENT: 0.6 % (ref 0–1)
BILIRUB SERPL-MCNC: 0.3 MG/DL (ref 0.2–1.2)
BUN BLDV-MCNC: 12 MG/DL (ref 6–20)
CALCIUM SERPL-MCNC: 9.7 MG/DL (ref 8.6–10)
CHLORIDE BLD-SCNC: 101 MMOL/L (ref 98–111)
CHOLESTEROL, TOTAL: 204 MG/DL (ref 160–199)
CO2: 29 MMOL/L (ref 22–29)
CREAT SERPL-MCNC: 0.7 MG/DL (ref 0.5–0.9)
EOSINOPHILS ABSOLUTE: 0.2 K/UL (ref 0–0.6)
EOSINOPHILS RELATIVE PERCENT: 3.6 % (ref 0–5)
GFR NON-AFRICAN AMERICAN: >60
GLUCOSE BLD-MCNC: 111 MG/DL (ref 74–109)
HBA1C MFR BLD: 5.6 % (ref 4–6)
HCT VFR BLD CALC: 39.9 % (ref 37–47)
HDLC SERPL-MCNC: 58 MG/DL (ref 65–121)
HEMOGLOBIN: 13.6 G/DL (ref 12–16)
LDL CHOLESTEROL CALCULATED: 131 MG/DL
LYMPHOCYTES ABSOLUTE: 1.7 K/UL (ref 1.1–4.5)
LYMPHOCYTES RELATIVE PERCENT: 32.9 % (ref 20–40)
MCH RBC QN AUTO: 31.6 PG (ref 27–31)
MCHC RBC AUTO-ENTMCNC: 34.1 G/DL (ref 33–37)
MCV RBC AUTO: 92.6 FL (ref 81–99)
MONOCYTES ABSOLUTE: 0.4 K/UL (ref 0–0.9)
MONOCYTES RELATIVE PERCENT: 7.7 % (ref 0–10)
NEUTROPHILS ABSOLUTE: 2.8 K/UL (ref 1.5–7.5)
NEUTROPHILS RELATIVE PERCENT: 54.6 % (ref 50–65)
PDW BLD-RTO: 12.3 % (ref 11.5–14.5)
PLATELET # BLD: 221 K/UL (ref 130–400)
PMV BLD AUTO: 10.6 FL (ref 9.4–12.3)
POTASSIUM SERPL-SCNC: 3.7 MMOL/L (ref 3.5–5)
RBC # BLD: 4.31 M/UL (ref 4.2–5.4)
SODIUM BLD-SCNC: 143 MMOL/L (ref 136–145)
T4 FREE: 1 NG/DL (ref 0.9–1.7)
TOTAL PROTEIN: 7.1 G/DL (ref 6.6–8.7)
TRIGL SERPL-MCNC: 75 MG/DL (ref 0–149)
TSH SERPL DL<=0.05 MIU/L-ACNC: 1.05 UIU/ML (ref 0.27–4.2)
VITAMIN D 25-HYDROXY: 28.5 NG/ML
WBC # BLD: 5.1 K/UL (ref 4.8–10.8)

## 2019-06-03 ENCOUNTER — OFFICE VISIT (OUTPATIENT)
Dept: URGENT CARE | Age: 54
End: 2019-06-03
Payer: COMMERCIAL

## 2019-06-03 ENCOUNTER — HOSPITAL ENCOUNTER (OUTPATIENT)
Dept: GENERAL RADIOLOGY | Age: 54
Discharge: HOME | End: 2019-06-03
Payer: COMMERCIAL

## 2019-06-03 VITALS
BODY MASS INDEX: 40.21 KG/M2 | DIASTOLIC BLOOD PRESSURE: 82 MMHG | HEART RATE: 95 BPM | TEMPERATURE: 98.2 F | WEIGHT: 227 LBS | OXYGEN SATURATION: 98 % | SYSTOLIC BLOOD PRESSURE: 127 MMHG | RESPIRATION RATE: 18 BRPM

## 2019-06-03 DIAGNOSIS — M25.512 ACUTE PAIN OF LEFT SHOULDER: ICD-10-CM

## 2019-06-03 DIAGNOSIS — M25.512 ACUTE PAIN OF LEFT SHOULDER: Primary | ICD-10-CM

## 2019-06-03 PROCEDURE — 99213 OFFICE O/P EST LOW 20 MIN: CPT | Performed by: NURSE PRACTITIONER

## 2019-06-03 PROCEDURE — 73030 X-RAY EXAM OF SHOULDER: CPT

## 2019-06-03 RX ORDER — MELOXICAM 15 MG/1
15 TABLET ORAL DAILY
Qty: 30 TABLET | Refills: 3 | Status: SHIPPED | OUTPATIENT
Start: 2019-06-03

## 2019-06-03 ASSESSMENT — ENCOUNTER SYMPTOMS
BACK PAIN: 0
RESPIRATORY NEGATIVE: 1

## 2019-06-03 NOTE — PROGRESS NOTES
[facility]  [address] [place] [address] [place] [address]  Dept: [phone]  Loc: [phone]     Jaz Bell is a 52 y.o. female who presents today for her medical conditions/complaintsas noted below. Jaz Bell is c/o of Shoulder Injury (last night around 2100. 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 \"/ left shoulder and arm )        HPI:     HPI     Pt presents with left shoulder pain. States she was at work last night and trying to position pt in bed when she heard shoulder pop. States pain was instant and rates 5/10 today. States limited ROM. States she used heat last night. Denies improvement in pain. Denies numbness and tingling. Denies any other symptoms.      POCT xray left shoulder: negative      Past Medical History:   Diagnosis Date    Anxiety     Bulging lumbar disc     L3-L4    Depression     GERD (gastroesophageal reflux disease)     HTN (hypertension)     Hyperlipidemia     Hypothyroid     Iron deficiency anemia       Past Surgical History:   Procedure Laterality Date    CHOLECYSTECTOMY      HYSTERECTOMY      FL COLONOSCOPY FLX DX W/COLLJ SPEC WHEN PFRMD N/A 6/11/2018    Dr Angela Brooks, 5 yr recall    FL ESOPHAGOGASTRODUODENOSCOPY TRANSORAL DIAGNOSTIC N/A 6/11/2018    Dr Jeevan Badillo (-)       Family History   Problem Relation Age of Onset    Diabetes Mother     Heart Disease Mother     Stroke Father     Diabetes Maternal Aunt     Stomach Cancer Maternal Aunt     Heart Disease Maternal Uncle     Other Brother     Colon Cancer Neg Hx     Colon Polyps Neg Hx     Esophageal Cancer Neg Hx     Liver Cancer Neg Hx     Liver Disease Neg Hx     Rectal Cancer Neg Hx        Social History     Tobacco Use    Smoking status: Never Smoker    Smokeless tobacco: Never Used   Substance Use Topics    Alcohol use: No     Alcohol/week: 0.0 oz      Current Outpatient Medications   Medication Sig Dispense Refill    meloxicam (MOBIC) 15 MG tablet Take 1 tablet by mouth daily 30 tablet 3    irbesartan-hydrochlorothiazide (AVALIDE) 150-12.5 MG per tablet Take 1 tablet by mouth daily      ferrous sulfate 325 (65 Fe) MG tablet Take 1 tablet by mouth 3 times daily (with meals) 30 tablet 3    sucralfate (CARAFATE) 1 GM tablet Take 1 g by mouth 4 times daily      Melatonin 10 MG TABS Take by mouth nightly       cetirizine (ZYRTEC ALLERGY) 10 MG tablet Take 10 mg by mouth daily      fluticasone (FLONASE) 50 MCG/ACT nasal spray 2 sprays by Nasal route daily 1 Bottle 0    omeprazole (PRILOSEC) 40 MG capsule Take 20 mg by mouth daily       QUEtiapine (SEROQUEL) 300 MG tablet Take 600 mg by mouth nightly      levothyroxine (SYNTHROID) 50 MCG tablet Take 50 mcg by mouth Daily      ALPRAZolam (XANAX) 0.5 MG tablet Take 0.5 mg by mouth nightly as needed for Sleep      ondansetron (ZOFRAN ODT) 4 MG disintegrating tablet Take 1 tablet by mouth every 8 hours as needed for Nausea or Vomiting 30 tablet 0    promethazine-dextromethorphan (PROMETHAZINE-DM) 6.25-15 MG/5ML syrup Take 5 mLs by mouth nightly as needed for Cough 120 mL 0     No current facility-administered medications for this visit. No Known Allergies    Health Maintenance   Topic Date Due    Hepatitis C screen  1965    HIV screen  07/07/1978    DTaP/Tdap/Td vaccine (1 - Tdap) 07/07/1982    Shingles Vaccine (1 of 2) 07/07/2013    Breast cancer screen  10/14/2016    Flu vaccine (Season Ended) 09/01/2019    TSH testing  05/06/2020    Potassium monitoring  05/06/2020    Creatinine monitoring  05/06/2020    Diabetes screen  05/06/2022    Colon cancer screen colonoscopy  06/11/2023    Lipid screen  05/06/2024    Pneumococcal 0-64 years Vaccine  Aged Out       Subjective:     Review of Systems   Constitutional: Negative. Respiratory: Negative. Cardiovascular: Negative. Musculoskeletal: Positive for myalgias.  Negative for back pain, gait problem, joint swelling, neck pain and neck stiffness. Left shoulder pain   Skin: Negative. Neurological: Negative. Psychiatric/Behavioral: Negative. Objective:     Physical Exam   Constitutional: She is oriented to person, place, and time. Vital signs are normal. She appears well-developed and well-nourished. Non-toxic appearance. She does not have a sickly appearance. She does not appear ill. No distress. HENT:   Head: Normocephalic. Neck: Normal range of motion. No muscular tenderness present. No edema, no erythema and normal range of motion present. Cardiovascular: Normal rate and regular rhythm. Pulmonary/Chest: Effort normal and breath sounds normal.   Abdominal: Soft. She exhibits no distension and no mass. There is no splenomegaly or hepatomegaly. There is no tenderness. There is no rebound and no CVA tenderness. Musculoskeletal: She exhibits tenderness. Left shoulder: She exhibits decreased range of motion, tenderness, pain and decreased strength. She exhibits no swelling. Lumbar back: She exhibits no swelling, no edema, no deformity, no laceration, no spasm and normal pulse. Neurological: She is alert and oriented to person, place, and time. Skin: Skin is warm and intact. Capillary refill takes less than 2 seconds. Psychiatric: She has a normal mood and affect. Her speech is normal and behavior is normal. Judgment and thought content normal. Cognition and memory are normal.   Vitals reviewed. /82   Pulse 95   Temp 98.2 °F (36.8 °C)   Resp 18   Wt 227 lb (103 kg)   SpO2 98%   BMI 40.21 kg/m²     Assessment:          Diagnosis Orders   1. Acute pain of left shoulder  meloxicam (MOBIC) 15 MG tablet    [facility]    XR SHOULDER LEFT (MIN 2 VIEWS)       Plan:    Pt states she can't return to work with restrictions.    Gave her treatment plan (copy in chart) to follow  Will return in 2 weeks for follow up  Pt agreed to plan  Orders Placed This Encounter   Procedures    XR SHOULDER LEFT (MIN 2 VIEWS)     Standing Status:   Future     Number of Occurrences:   1     Standing Expiration Date:   6/3/2020     Order Specific Question:   Reason for exam:     Answer:   left shoulder pain   [facility]     Referral Priority:   Routine     Referral Type:   Eval and Treat     Referral Reason:   Specialty Services Required     Requested Specialty:   Physical Therapy     Number of Visits Requested:   1        No follow-ups on file. Orders Placed This Encounter   Procedures    XR SHOULDER LEFT (MIN 2 VIEWS)     Standing Status:   Future     Number of Occurrences:   1     Standing Expiration Date:   6/3/2020     Order Specific Question:   Reason for exam:     Answer:   left shoulder pain   [facility]     Referral Priority:   Routine     Referral Type:   Eval and Treat     Referral Reason:   Specialty Services Required     Requested Specialty:   Physical Therapy     Number of Visits Requested:   1     Orders Placed This Encounter   Medications    meloxicam (MOBIC) 15 MG tablet     Sig: Take 1 tablet by mouth daily     Dispense:  30 tablet     Refill:  3       Patient given educationalmaterials - see patient instructions. Discussed use, benefit, and side effectsof prescribed medications. All patient questions answered. Pt voiced understanding. Reviewed health maintenance. Instructed to continue current medications, diet andexercise. Patient agreed with treatment plan. Follow up as directed. Patient Instructions   POCT xray is negative  1. Follow up with PT appt  2. Take meloxicam daily as prescribed  3. Follow up 2 week appt at   4.   Follow up sooner if symptoms worsen or fail to improve        Electronically signed by CHONG Fernandez CNP on 6/3/2019 at 1:40 PM

## 2019-06-05 ENCOUNTER — TELEPHONE (OUTPATIENT)
Dept: URGENT CARE | Age: 54
End: 2019-06-05

## 2019-06-13 ENCOUNTER — HOSPITAL ENCOUNTER (OUTPATIENT)
Dept: PHYSICAL THERAPY | Age: 54
Setting detail: THERAPIES SERIES
Discharge: HOME | End: 2019-06-13
Payer: COMMERCIAL

## 2019-06-13 ENCOUNTER — TELEPHONE (OUTPATIENT)
Dept: URGENT CARE | Age: 54
End: 2019-06-13

## 2019-06-13 PROCEDURE — 97162 PT EVAL MOD COMPLEX 30 MIN: CPT

## 2019-06-13 ASSESSMENT — PAIN DESCRIPTION - PAIN TYPE: TYPE: ACUTE PAIN

## 2019-06-13 ASSESSMENT — PAIN DESCRIPTION - LOCATION: LOCATION: SHOULDER;NECK

## 2019-06-13 ASSESSMENT — PAIN DESCRIPTION - ORIENTATION: ORIENTATION: LEFT

## 2019-06-13 ASSESSMENT — PAIN DESCRIPTION - FREQUENCY: FREQUENCY: CONTINUOUS

## 2019-06-13 NOTE — TELEPHONE ENCOUNTER
Pt was in the office for a w/c claim. She had given provider fmla papers to be filled out, and a section was left blank. Pts work is requesting for this section to be done and re-faxed to them.

## 2019-06-13 NOTE — PROGRESS NOTES
Physical Therapy  Initial Assessment  Date: 2019  Patient Name: Josefina Churchill  MRN: [id number]  : 1965     Treatment Diagnosis: L shoulder pain    Restrictions       Subjective   General  Chart Reviewed: Yes  Patient assessed for rehabilitation services?: Yes  Additional Pertinent Hx: 53 y/o F presents with L shoulder pain. 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 Hooked L UE under pt's UE to scoot. [place] pop in upper trap area and realized that lateral deltoid area was \"hot and throbbing,\" with pain to elbow. Since then has radiating pain to L hand (thumb goes numb.)  Radiating pain in roly-scapular area as well, and states \"if you could take your finger and put it under my shoulder blade, it's right there. \"  Also initially had pain in L pec area, though this has mostly resolved. Most pain with abduction. Pain Screening  Patient Currently in Pain: Yes  Pain Assessment  Pain Assessment: 0-10  Patient's Stated Pain Goal: 5(3-4/10 usually)  Pain Type: Acute pain  Pain Location: Shoulder;Neck  Pain Orientation: Left  Pain Descriptors: Spasm;Aching; Throbbing;Constant  Pain Frequency: Continuous  Vital Signs  Patient Currently in Pain: Yes    Vision/Hearing  Vision  Vision: Within Functional Limits  Hearing  Hearing: Within functional limits    Orientation  Orientation  Overall Orientation Status: Within Normal Limits    Social/Functional History  Social/Functional History  ADL Assistance: Independent(But with extra pain)  Ambulation Assistance: Independent  Active : Yes  Occupation: Workers comp  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 Observation: Guards use of LUE for all reaching and lifting activities. Ambulates with LUE held at side, no arm swing. Edema: Denies any edema. PROM LUE (degrees)  LUE PROM: Exceptions  LUE General PROM: Supine  L Shoulder Flex  0-170: 150 degrees  L Shoulder ABduction 0-140: 120 degrees  L Shoulder Int Rotation  0-70: 92 degrees  L Shoulder Ext Rotation  0-90: 65 degrees  AROM LUE (degrees)  LUE AROM : Exceptions  LUE General AROM: Supine  L Shoulder Flexion 0-180: 146 degrees  L Shoulder ABduction 0-180: 60 degrees  L Shoulder Int Rotation  0-70: 72 degrees  L Shoulder Ext Rotation  0-90: 52 degrees    Strength RUE  R Shoulder Flexion: 5/5  R Shoulder Extension: 5/5  R Shoulder ABduction: 5/5  R Shoulder Internal Rotation: 5/5  R Shoulder External Rotation: 5/5  R Elbow Flexion: 5/5  R Elbow Extension: 5/5  R Forearm Pron: 5/5  R Forearm Sup: 5/5  R Wrist Flexion: 5/5  R Wrist Extension: 5/5  Strength LUE  L Shoulder Flexion: 4-/5  L Shoulder Extension: 4/5  L Shoulder ABduction: 3+/5  L Shoulder Internal Rotation: 4/5  L Shoulder External Rotation: 4-/5  L Elbow Flexion: 4/5  L Elbow Extension: 4/5  L Forearm Pron: 5/5  L Forearm Sup: 5/5  L Wrist Flexion: 5/5  L Wrist Extension: 5/5  Strength Other  Other: R handed.  equal and WNL.   Motor Control  Gross Motor?: WNL  Additional Measures  Special Tests: (-)Petersen-Rick, (+)Full and Empty Can, (-)Lift-off, (+)Speed  Other: QuickDASH: 50% impairment (General)  Sensation  Overall Sensation Status: WFL(WFL to light touch, but intermittent N/T in L thumb)                   Exercises  Exercise 1: U/S to L biceps tendon insertion  Exercise 2: STM to L upper

## 2019-06-14 ENCOUNTER — HOSPITAL ENCOUNTER (OUTPATIENT)
Dept: WOMENS IMAGING | Age: 54
Discharge: HOME | End: 2019-06-14
Payer: COMMERCIAL

## 2019-06-14 DIAGNOSIS — M81.8 OTHER OSTEOPOROSIS WITHOUT CURRENT PATHOLOGICAL FRACTURE: ICD-10-CM

## 2019-06-14 DIAGNOSIS — Z12.39 BREAST CANCER SCREENING: ICD-10-CM

## 2019-06-14 PROCEDURE — 77063 BREAST TOMOSYNTHESIS BI: CPT

## 2019-06-14 PROCEDURE — 77080 DXA BONE DENSITY AXIAL: CPT

## 2019-06-18 ENCOUNTER — OFFICE VISIT (OUTPATIENT)
Dept: URGENT CARE | Age: 54
End: 2019-06-18
Payer: COMMERCIAL

## 2019-06-18 ENCOUNTER — HOSPITAL ENCOUNTER (OUTPATIENT)
Dept: PHYSICAL THERAPY | Age: 54
Setting detail: THERAPIES SERIES
Discharge: HOME | End: 2019-06-18
Payer: COMMERCIAL

## 2019-06-18 VITALS
HEART RATE: 101 BPM | BODY MASS INDEX: 40.21 KG/M2 | WEIGHT: 227 LBS | TEMPERATURE: 98 F | RESPIRATION RATE: 18 BRPM | OXYGEN SATURATION: 93 % | DIASTOLIC BLOOD PRESSURE: 72 MMHG | SYSTOLIC BLOOD PRESSURE: 110 MMHG

## 2019-06-18 DIAGNOSIS — M25.512 ACUTE PAIN OF LEFT SHOULDER: Primary | ICD-10-CM

## 2019-06-18 PROCEDURE — 99212 OFFICE O/P EST SF 10 MIN: CPT | Performed by: NURSE PRACTITIONER

## 2019-06-18 PROCEDURE — 97110 THERAPEUTIC EXERCISES: CPT

## 2019-06-18 PROCEDURE — 97035 APP MDLTY 1+ULTRASOUND EA 15: CPT

## 2019-06-18 ASSESSMENT — PAIN DESCRIPTION - PAIN TYPE: TYPE: ACUTE PAIN

## 2019-06-18 ASSESSMENT — PAIN DESCRIPTION - ORIENTATION: ORIENTATION: LEFT

## 2019-06-18 ASSESSMENT — PAIN SCALES - GENERAL: PAINLEVEL_OUTOF10: 5

## 2019-06-18 ASSESSMENT — PAIN DESCRIPTION - LOCATION: LOCATION: SHOULDER;NECK;ARM

## 2019-06-18 NOTE — PROGRESS NOTES
Daily Treatment Note  Date: 2019  Patient Name: Sue Hall  MRN: [id number]     :   1965    Subjective:   General  Chart Reviewed: Yes  Additional Pertinent Hx: 51 y/o F presents with L shoulder pain. 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 Pain Screening  Patient Currently in Pain: Yes  Pain Assessment  Pain Assessment: 0-10  Pain Level: 5(5/10 to start and 1/10 to end session)  Pain Type: Acute pain  Pain Location: Shoulder;Neck;Arm(sometimes radiates down arm)  Pain Orientation: Left  Pain Descriptors: Spasm;Aching; Throbbing;Constant  Vital Signs  Patient Currently in Pain: Yes       Treatment Activities:              Exercises  Exercise 1: U/S to L biceps tendon insertion x 8 min 2 w/cm2  Exercise 2: STM to L upper trap/periscapular area x 8 min  Exercise 3: Pulleys x 5 min  Exercise 4: Pendulums x 10  Exercise 5: Wall walks x 5  Exercise 6: Isometric ball flex/ext/abd/ER/IR x 10  Exercise 7: Fwd bent row x 0  Exercise 8: Scapular retraction with t-band x 0  Exercise 9: Rhomboid stretch on pole x 0  Exercise 10: T-band L shoulder flex/ext/abd/ER/IR x 0  Exercise 11: Standing Is, Ts x 0  Exercise 12: Supine cane flex/abd/ER x 10  Exercise 13: Supine cane benchpress/shoulder protraction x 10  Exercise 14: Supine AAROM L shoulder flex/abd/ER/IR x 10  Exercise 15: Sidelying L subscap stretch x 0                 Assessment:   Conditions Requiring Skilled Therapeutic Intervention  Body structures, Functions, Activity limitations: Decreased high-level IADLs;Decreased ADL status; Decreased ROM; Decreased strength; Increased Pain  Assessment: Initiated POC as established by primary PT for L shoulder pain. Did not perform full routine due to time constraints. Pt with large trigger point L upper trap. Pt experiences tingling sensations L forearm to hand and thumb with cane flex and AA ER in supine. Pt with good effort and pain reduced at end of session by 4 points. Treatment Diagnosis: L shoulder pain  Decision Making: Medium Complexity  Patient Education: Plan of care  REQUIRES PT FOLLOW UP: Yes  Discharge Recommendations: Continue to assess pending progress      G-Code:     OutComes Score                                                     Goals:  Short term goals  Time Frame for Short term goals: 3-4 weeks  Short term goal 1: Independent with HEP  Short term goal 2: Improve L shoulder AAROM to at least 175 degrees flex, 160 degrees abd, 75 degrees ER. Short term goal 3: Improve L shoulder strength to grossly at least 4+/5. Short term goal 4: Report pain on avg as 3/10 or less. Long term goals  Time Frame for Long term goals : 4-6 weeks  Long term goal 1: Report improved tolerance to sleeping on L side. Long term goal 2: Improve L shoulder AROM to at least 165 degrees flex, 160 degrees abd, 75 degrees ER. Long term goal 3: Return to work. Long term goal 4: Improve QuickDASH score to less than 30% impairment.   Patient Goals   Patient goals : reduce L shoulder pain    Plan:    Plan  Times per week: 2x  Plan weeks: 4-6 weeks  Current Treatment Recommendations: Strengthening, Patient/Caregiver Education & Training, ROM, Equipment Evaluation, Education, & procurement, Modalities, Manual Therapy - Soft Tissue Mobilization, Home Exercise Program, Safety Education & Training  Timed Code Treatment Minutes: 62 Minutes     Therapy Time   Individual Concurrent Group Co-treatment   Time In 1063         Time Out 1517         Minutes 58         Timed Code Treatment Minutes: 58 Minutes Autumn Batista PTA       Electronically signed by Autumn Batista PTA on 6/18/2019 at 4:12 PM

## 2019-06-18 NOTE — PROGRESS NOTES
Vomiting 30 tablet 0    promethazine-dextromethorphan (PROMETHAZINE-DM) 6.25-15 MG/5ML syrup Take 5 mLs by mouth nightly as needed for Cough 120 mL 0    ferrous sulfate 325 (65 Fe) MG tablet Take 1 tablet by mouth 3 times daily (with meals) 30 tablet 3    sucralfate (CARAFATE) 1 GM tablet Take 1 g by mouth 4 times daily      Melatonin 10 MG TABS Take by mouth nightly       cetirizine (ZYRTEC ALLERGY) 10 MG tablet Take 10 mg by mouth daily      fluticasone (FLONASE) 50 MCG/ACT nasal spray 2 sprays by Nasal route daily 1 Bottle 0    omeprazole (PRILOSEC) 40 MG capsule Take 20 mg by mouth daily       QUEtiapine (SEROQUEL) 300 MG tablet Take 600 mg by mouth nightly      levothyroxine (SYNTHROID) 50 MCG tablet Take 50 mcg by mouth Daily      ALPRAZolam (XANAX) 0.5 MG tablet Take 0.5 mg by mouth nightly as needed for Sleep       No current facility-administered medications for this visit. No Known Allergies    Health Maintenance   Topic Date Due    Hepatitis C screen  1965    HIV screen  07/07/1978    DTaP/Tdap/Td vaccine (1 - Tdap) 07/07/1982    Shingles Vaccine (1 of 2) 07/07/2013    Flu vaccine (Season Ended) 09/01/2019    TSH testing  05/06/2020    Potassium monitoring  05/06/2020    Creatinine monitoring  05/06/2020    Breast cancer screen  06/14/2021    Diabetes screen  05/06/2022    Colon cancer screen colonoscopy  06/11/2023    Lipid screen  05/06/2024    Pneumococcal 0-64 years Vaccine  Aged Out       Subjective:     Review of Systems   Musculoskeletal:        Left shoulder pain     All other systems reviewed and are negative.      :Objective      Physical Exam   Constitutional: She appears well-developed and well-nourished. No distress. HENT:   Head: Normocephalic and atraumatic. Right Ear: External ear normal.   Left Ear: External ear normal.   Nose: Nose normal.   Eyes: Pupils are equal, round, and reactive to light.  Conjunctivae are normal.   Musculoskeletal:        Left shoulder: She exhibits decreased range of motion. Skin: Skin is warm and dry. She is not diaphoretic. Nursing note and vitals reviewed. /72   Pulse 101   Temp 98 °F (36.7 °C)   Resp 18   Wt 227 lb (103 kg)   SpO2 93%   BMI 40.21 kg/m²     :Assessment       Diagnosis Orders   1. Acute pain of left shoulder  Jules Perez MD, Orthopaedic Surgery, [facility]       :Plan    Return to work with light duty. Restrictions marked on form. Referral to ortho and patient to continue with them for further treatment. Orders Placed This Encounter   Procedures   Sudheer Kaur MD, Orthopaedic Surgery, [facility]     Referral Priority:   Routine     Referral Type:   Eval and Treat     Referral Reason:   Specialty Services Required     Referred to Provider:   Yocasta Nguyen MD     Requested Specialty:   Orthopedic Surgery     Number of Visits Requested:   1       No follow-ups on file. No orders of the defined types were placed in this encounter. Patient given educational materials- see patient instructions. Discussed use, benefit, and side effects of prescribedmedications. All patient questions answered. Pt voiced understanding. There are no Patient Instructions on file for this visit.       Electronically signed by CHONG Michel on 6/18/2019 at 11:52 AM

## 2019-06-20 ENCOUNTER — HOSPITAL ENCOUNTER (OUTPATIENT)
Dept: PHYSICAL THERAPY | Age: 54
Setting detail: THERAPIES SERIES
Discharge: HOME | End: 2019-06-20
Payer: COMMERCIAL

## 2019-06-20 PROCEDURE — 97035 APP MDLTY 1+ULTRASOUND EA 15: CPT

## 2019-06-20 PROCEDURE — 97110 THERAPEUTIC EXERCISES: CPT

## 2019-06-20 PROCEDURE — 97140 MANUAL THERAPY 1/> REGIONS: CPT

## 2019-06-20 ASSESSMENT — PAIN DESCRIPTION - ORIENTATION: ORIENTATION: LEFT

## 2019-06-20 ASSESSMENT — PAIN DESCRIPTION - PAIN TYPE: TYPE: ACUTE PAIN

## 2019-06-20 ASSESSMENT — PAIN DESCRIPTION - FREQUENCY: FREQUENCY: CONTINUOUS

## 2019-06-20 ASSESSMENT — PAIN SCALES - GENERAL: PAINLEVEL_OUTOF10: 3

## 2019-06-20 ASSESSMENT — PAIN DESCRIPTION - LOCATION: LOCATION: SHOULDER;NECK;ARM

## 2019-06-25 ENCOUNTER — HOSPITAL ENCOUNTER (OUTPATIENT)
Dept: PHYSICAL THERAPY | Age: 54
Setting detail: THERAPIES SERIES
Discharge: HOME | End: 2019-06-25
Payer: COMMERCIAL

## 2019-06-25 PROCEDURE — 97110 THERAPEUTIC EXERCISES: CPT

## 2019-06-25 PROCEDURE — 97035 APP MDLTY 1+ULTRASOUND EA 15: CPT

## 2019-06-25 PROCEDURE — 97140 MANUAL THERAPY 1/> REGIONS: CPT

## 2019-06-25 ASSESSMENT — PAIN SCALES - GENERAL: PAINLEVEL_OUTOF10: 4

## 2019-06-25 ASSESSMENT — PAIN DESCRIPTION - LOCATION: LOCATION: SHOULDER;ARM;NECK

## 2019-06-25 ASSESSMENT — PAIN DESCRIPTION - PAIN TYPE: TYPE: ACUTE PAIN

## 2019-06-25 ASSESSMENT — PAIN DESCRIPTION - ORIENTATION: ORIENTATION: LEFT

## 2019-06-27 ENCOUNTER — HOSPITAL ENCOUNTER (OUTPATIENT)
Dept: PHYSICAL THERAPY | Age: 54
Setting detail: THERAPIES SERIES
End: 2019-06-27
Payer: COMMERCIAL

## 2019-07-01 ENCOUNTER — APPOINTMENT (OUTPATIENT)
Dept: PHYSICAL THERAPY | Age: 54
End: 2019-07-01
Payer: COMMERCIAL

## 2019-07-03 ENCOUNTER — HOSPITAL ENCOUNTER (OUTPATIENT)
Dept: PHYSICAL THERAPY | Age: 54
Setting detail: THERAPIES SERIES
Discharge: HOME | End: 2019-07-03
Payer: COMMERCIAL

## 2019-07-03 PROCEDURE — 97035 APP MDLTY 1+ULTRASOUND EA 15: CPT

## 2019-07-03 PROCEDURE — 97140 MANUAL THERAPY 1/> REGIONS: CPT

## 2019-07-03 PROCEDURE — 97110 THERAPEUTIC EXERCISES: CPT

## 2019-07-03 NOTE — PROGRESS NOTES
Physical Therapy  Daily Treatment Note  Date: 7/3/2019  Patient Name: Brayan Monae  MRN: [id number]     :   1965    Subjective:   General  Additional Pertinent Hx: 53 y/o F presents with L shoulder pain. PMH includes L3-L4 disc herniation  Referring Practitioner: CHONG Rosa  PT Visit Information  Onset Date: 19  PT Insurance Information: Workers comp  Total # of Visits Approved: 12  Total # of Visits to Date: 5  Progress Note Due Date: 19  Subjective: I had to cancell on Monday, my Aunt  and we had her . After the last session i haven't had any of the tingling in my arm or hand and right now i'm not in any pain. Patient Currently in Pain: No         Treatment Activities:         Exercises  Exercise 1: U/S to L biceps tendon insertion x 8 min 2 w/cm2  Exercise 2: STM to L upper trap/periscapular area x 10 min  (L infra and supraspinatus)  Exercise 3: Pulleys x 5 min  Exercise 4: Pendulums x 10  Exercise 5: Wall walks x 5  Exercise 6: Isometric ball flex/ext/abd/ER/IR x 10    : issued isometrics  Exercise 7: Fwd bent row x 10  Exercise 8: Scapular retraction with t-band  (red) x 10  Exercise 9: Rhomboid stretch on pole  5\" x 5  Exercise 10: T-band L shoulder flex/ext/abd/ER/IR x 10  (RED)  Exercise 11: Standing Is, Ts x 0--not today  Exercise 12: Supine cane flex/abd/ER x 10  Exercise 13: Supine cane benchpress/shoulder protraction x 10  Exercise 14: Supine AAROM L shoulder flex/abd/ER/IR x 10  Exercise 15: Sidelying L subscap stretch  2 x 10 sec--very difficult to get under scapula--not today         Assessment:   Conditions Requiring Skilled Therapeutic Intervention  Body structures, Functions, Activity limitations: Decreased high-level IADLs;Decreased ADL status; Decreased ROM; Decreased strength; Increased Pain  Assessment: Patient did better today with session, had decreased pain with majority of exercises today.  Added resisted shoulder exercises to routine, still had active trigger points in L shoulder musculature but hypersensitivity greatly decreased from previous session. Treatment Diagnosis: L shoulder pain  REQUIRES PT FOLLOW UP: Yes      G-Code:     Goals:  Short term goals  Time Frame for Short term goals: 3-4 weeks  Short term goal 1: Independent with HEP  Short term goal 2: Improve L shoulder AAROM to at least 175 degrees flex, 160 degrees abd, 75 degrees ER. Short term goal 3: Improve L shoulder strength to grossly at least 4+/5. Short term goal 4: Report pain on avg as 3/10 or less. Long term goals  Time Frame for Long term goals : 4-6 weeks  Long term goal 1: Report improved tolerance to sleeping on L side. Long term goal 2: Improve L shoulder AROM to at least 165 degrees flex, 160 degrees abd, 75 degrees ER. Long term goal 3: Return to work. Long term goal 4: Improve QuickDASH score to less than 30% impairment.   Patient Goals   Patient goals : reduce L shoulder pain    Plan:    Times per week: 2x  Plan weeks: 4-6 weeks  Current Treatment Recommendations: Strengthening, Patient/Caregiver Education & Training, ROM, Equipment Evaluation, Education, & procurement, Modalities, Manual Therapy - Soft Tissue Mobilization, Home Exercise Program, Safety Education & Training        Therapy Time   Individual Concurrent Group Co-treatment   Time In 1245         Time Out 1339         Minutes 47                 Yamile Loza PTA    Electronically signed by Yamile Loza PTA on 7/3/2019 at 1:43 PM

## 2019-07-09 ENCOUNTER — HOSPITAL ENCOUNTER (OUTPATIENT)
Dept: PHYSICAL THERAPY | Age: 54
Setting detail: THERAPIES SERIES
Discharge: HOME | End: 2019-07-09
Payer: COMMERCIAL

## 2019-07-09 PROCEDURE — 97110 THERAPEUTIC EXERCISES: CPT

## 2019-07-09 ASSESSMENT — PAIN DESCRIPTION - LOCATION: LOCATION: SHOULDER;ARM;NECK

## 2019-07-09 ASSESSMENT — PAIN SCALES - GENERAL: PAINLEVEL_OUTOF10: 5

## 2019-07-09 NOTE — PROGRESS NOTES
Physical Therapy  Daily Treatment Note  Date: 2019  Patient Name: Yahir Jenkins  MRN: [id number]     :   1965    Subjective:   General  Additional Pertinent Hx: 51 y/o F presents with L shoulder pain. PMH includes L3-L4 disc herniation  Referring Practitioner: CHONG Dorman  PT Visit Information  Onset Date: 19  PT Insurance Information: Workers comp  Total # of Visits Approved: 12  Total # of Visits to Date: 6  Progress Note Due Date: 19  Subjective  Subjective: Patient states the effects of steroids appear to have waned and she is scheduled  to see her doctor for possible injection. She states that she has had elevated left shoulder pain for about 4 days now. Pain is constant now.    Pain Screening  Patient Currently in Pain: No  Pain Assessment  Pain Level: 5  Pain Location: Shoulder;Arm;Neck  Vital Signs  Patient Currently in Pain: No       Treatment Activities:                                      Exercises  Exercise 1: U/S to L biceps tendon insertion x 8 min 2 w/cm2 (biceps and supraspinatus)  Exercise 2: STM to L upper trap/periscapular area x 10 min  (L infra and supraspinatus)  Exercise 3: Pulleys x 5 min  Exercise 4: Pendulums x 10  Exercise 5: Wall walks x 5  Exercise 6: Isometric ball flex/ext/abd/ER/IR x 10    : issued isometrics  Exercise 7: Fwd bent row x 10  Exercise 8: Scapular retraction with t-band  (red) x 15  Exercise 9: Rhomboid stretch on pole  5\" x 5  Exercise 10: T-band L shoulder flex/ext/abd/ER/IR x 10  (RED)  Exercise 11: Standing Is, Ts x 10/10  Exercise 12: Supine cane flex/abd/ER x 10  Exercise 13: Supine cane benchpress/shoulder protraction x 10  Exercise 14: Supine AAROM L shoulder flex/abd/ER/IR x 10  Exercise 15: Sidelying L subscap stretch  2 x 10 sec--very difficult to get under scapula--not today                                   Assessment:   Conditions Requiring Skilled Therapeutic Intervention  Body structures, Functions, Activity limitations: Decreased high-level IADLs;Decreased ADL status; Decreased ROM; Decreased strength; Increased Pain  Assessment: Mrs. Jean Carlos Luis was reporting increased pain since she was last seen, due in part to the positive effects from steroids appearing to be fading. She gave good effort with her session, and a few new exercises were added. She reported lessened pain after soft tissue massage/ ischemic point pressure application to left upper traps and cervical shaan. Treatment Diagnosis: L shoulder pain  REQUIRES PT FOLLOW UP: Yes      G-Code:     OutComes Score                                                     Goals:  Short term goals  Time Frame for Short term goals: 3-4 weeks  Short term goal 1: Independent with HEP  Short term goal 2: Improve L shoulder AAROM to at least 175 degrees flex, 160 degrees abd, 75 degrees ER. Short term goal 3: Improve L shoulder strength to grossly at least 4+/5. Short term goal 4: Report pain on avg as 3/10 or less. Long term goals  Time Frame for Long term goals : 4-6 weeks  Long term goal 1: Report improved tolerance to sleeping on L side. Long term goal 2: Improve L shoulder AROM to at least 165 degrees flex, 160 degrees abd, 75 degrees ER. Long term goal 3: Return to work. Long term goal 4: Improve QuickDASH score to less than 30% impairment.   Patient Goals   Patient goals : reduce L shoulder pain    Plan:    Plan  Times per week: 2x  Plan weeks: 4-6 weeks  Current Treatment Recommendations: Strengthening, Patient/Caregiver Education & Training, ROM, Equipment Evaluation, Education, & procurement, Modalities, Manual Therapy - Soft Tissue Mobilization, Home Exercise Program, Safety Education & Training  Timed Code Treatment Minutes: 58 Minutes     Therapy Time   Individual Concurrent Group Co-treatment   Time In 1455         Time Out 1340         Minutes 58         Timed Code Treatment Minutes: 58 Minutes     Electronically signed by Roseanna Garcia PT on 7/9/2019 at 3:09

## 2019-07-11 ENCOUNTER — HOSPITAL ENCOUNTER (OUTPATIENT)
Dept: PHYSICAL THERAPY | Age: 54
Setting detail: THERAPIES SERIES
Discharge: HOME | End: 2019-07-11
Payer: COMMERCIAL

## 2019-07-11 PROCEDURE — 97110 THERAPEUTIC EXERCISES: CPT

## 2019-07-11 PROCEDURE — 97035 APP MDLTY 1+ULTRASOUND EA 15: CPT

## 2019-07-11 ASSESSMENT — PAIN DESCRIPTION - ORIENTATION: ORIENTATION: LEFT

## 2019-07-11 ASSESSMENT — PAIN SCALES - GENERAL: PAINLEVEL_OUTOF10: 4

## 2019-07-11 ASSESSMENT — PAIN DESCRIPTION - PAIN TYPE: TYPE: CHRONIC PAIN

## 2019-07-11 ASSESSMENT — PAIN DESCRIPTION - LOCATION: LOCATION: SHOULDER;ARM

## 2019-07-11 NOTE — PROGRESS NOTES
stated she felt woozy but thought it was due to her B/P. Still has active trigger points in L supraspinatus, infraspinatus and upper trap performing STM and these points reproduce the vast majority of pain which she c/o. Added golfers stretch today to routine. Treatment Diagnosis: L shoulder pain  REQUIRES PT FOLLOW UP: Yes        Goals:  Short term goals  Time Frame for Short term goals: 3-4 weeks  Short term goal 1: Independent with HEP  Short term goal 2: Improve L shoulder AAROM to at least 175 degrees flex, 160 degrees abd, 75 degrees ER. Short term goal 3: Improve L shoulder strength to grossly at least 4+/5. Short term goal 4: Report pain on avg as 3/10 or less. Long term goals  Time Frame for Long term goals : 4-6 weeks  Long term goal 1: Report improved tolerance to sleeping on L side. Long term goal 2: Improve L shoulder AROM to at least 165 degrees flex, 160 degrees abd, 75 degrees ER. Long term goal 3: Return to work. Long term goal 4: Improve QuickDASH score to less than 30% impairment.   Patient Goals   Patient goals : reduce L shoulder pain    Plan:   Times per week: 2x  Plan weeks: 4-6 weeks  Current Treatment Recommendations: Strengthening, Patient/Caregiver Education & Training, ROM, Equipment Evaluation, Education, & procurement, Modalities, Manual Therapy - Soft Tissue Mobilization, Home Exercise Program, Safety Education & Training        Therapy Time   Individual Concurrent Group Co-treatment   Time In 1240         Time Out 1333         Minutes 48                 Nallely Martinez PTA    Electronically signed by Nallely Martinez PTA on 7/11/2019 at 3:29 PM

## 2019-07-16 ENCOUNTER — HOSPITAL ENCOUNTER (OUTPATIENT)
Dept: PHYSICAL THERAPY | Age: 54
Setting detail: THERAPIES SERIES
Discharge: HOME | End: 2019-07-16
Payer: COMMERCIAL

## 2019-07-16 PROCEDURE — 97110 THERAPEUTIC EXERCISES: CPT

## 2019-07-16 PROCEDURE — 97035 APP MDLTY 1+ULTRASOUND EA 15: CPT

## 2019-07-16 ASSESSMENT — PAIN DESCRIPTION - DESCRIPTORS: DESCRIPTORS: TIGHTNESS;SPASM

## 2019-07-16 ASSESSMENT — PAIN SCALES - GENERAL: PAINLEVEL_OUTOF10: 2

## 2019-07-16 ASSESSMENT — PAIN DESCRIPTION - ORIENTATION: ORIENTATION: LEFT

## 2019-07-16 ASSESSMENT — PAIN DESCRIPTION - LOCATION: LOCATION: SHOULDER;ARM

## 2019-07-19 ENCOUNTER — HOSPITAL ENCOUNTER (OUTPATIENT)
Dept: PHYSICAL THERAPY | Age: 54
Setting detail: THERAPIES SERIES
Discharge: HOME | End: 2019-07-19
Payer: COMMERCIAL

## 2019-07-19 PROCEDURE — 97035 APP MDLTY 1+ULTRASOUND EA 15: CPT

## 2019-07-19 PROCEDURE — 97110 THERAPEUTIC EXERCISES: CPT

## 2019-07-19 ASSESSMENT — PAIN DESCRIPTION - DESCRIPTORS: DESCRIPTORS: TIGHTNESS;SPASM

## 2019-07-19 ASSESSMENT — PAIN DESCRIPTION - PAIN TYPE: TYPE: ACUTE PAIN;CHRONIC PAIN

## 2019-07-19 ASSESSMENT — PAIN DESCRIPTION - ORIENTATION: ORIENTATION: LEFT

## 2019-07-19 ASSESSMENT — PAIN SCALES - GENERAL: PAINLEVEL_OUTOF10: 2

## 2019-07-19 ASSESSMENT — PAIN DESCRIPTION - LOCATION: LOCATION: SHOULDER;ARM

## 2019-07-22 ENCOUNTER — APPOINTMENT (OUTPATIENT)
Dept: PHYSICAL THERAPY | Age: 54
End: 2019-07-22
Payer: COMMERCIAL

## 2019-07-25 ENCOUNTER — APPOINTMENT (OUTPATIENT)
Dept: PHYSICAL THERAPY | Age: 54
End: 2019-07-25
Payer: COMMERCIAL

## 2019-07-26 NOTE — PROGRESS NOTES
[facility] Outpatient Physical Therapy  Discharge Summary        Date:2019    Patient Goyo Childers    IJK:[id number]    :1965    Diagnosis:Diagnosis: Acute L shoulder pain           Total # of Visits to Date: 9     Subjective: I am doing okay but I somehow pulled my shoulder muscle a few mins ago. I think this therapy will help me today with that. I will see doctor Heri Moss next week.       Objective data:     Range of Motion:     Initial Evaluation Discharge   LUE General PROM: Supine  L Shoulder Flex  0-170: 150 degrees  L Shoulder ABduction 0-140: 120 degrees  L Shoulder Int Rotation  0-70: 92 degrees  L Shoulder Ext Rotation  0-90: 65 degrees  AROM LUE  LUE General AROM: Supine  L Shoulder Flexion 0-180: 146 degrees  L Shoulder ABduction 0-180: 60 degrees  L Shoulder Int Rotation  0-70: 72 degrees  L Shoulder Ext Rotation  0-90: 52 degrees LUE General PROM: Supine  L Shoulder Flex  0-170: 180 degrees  L Shoulder ABduction 0-140: 175 degrees  L Shoulder Int Rotation  0-70: 92 degrees  L Shoulder Ext Rotation  0-90: 78 degrees  AROM LUE  LUE General AROM: Supine  L Shoulder Flexion 0-180: 163 degrees  L Shoulder ABduction 0-180:160 degrees  L Shoulder Int Rotation  0-70: 72 degrees  L Shoulder Ext Rotation  0-90: 73 degrees       Manual Muscle Test:    Initial Evaluation Discharge   Strength LUE  L Shoulder Flexion: 4-/5  L Shoulder Extension: 4/5  L Shoulder ABduction: 3+/5  L Shoulder Internal Rotation: 4/5  L Shoulder External Rotation: 4-/5  L Elbow Flexion: 4/5  L Elbow Extension: 4/5  L Forearm Pron: 5/5  L Forearm Sup: 5/5  L Wrist Flexion: 5/5  L Wrist Extension: 5/5 Strength LUE  L Shoulder Flexion: 5/5  L Shoulder Extension: 5/5  L Shoulder ABduction: 5/5  L Shoulder Internal Rotation: 5/5  L Shoulder External Rotation: 4+/5  L Elbow Flexion: 5/5  L Elbow Extension: 5/5  L Forearm Pron: 5/5  L Forearm Sup: 5/5  L Wrist Flexion: 5/5  L Wrist Extension: 5/5            Assessment: Patient did on 7/26/2019 at 9:13 AM

## 2019-09-05 ENCOUNTER — HOSPITAL ENCOUNTER (OUTPATIENT)
Dept: INFUSION THERAPY | Age: 54
Discharge: HOME | End: 2019-09-05
Payer: COMMERCIAL

## 2019-09-05 DIAGNOSIS — Z86.2 HISTORY OF ANEMIA: ICD-10-CM

## 2019-09-05 DIAGNOSIS — Z86.2 HISTORY OF ANEMIA: Primary | ICD-10-CM

## 2019-09-05 PROCEDURE — 85025 COMPLETE CBC W/AUTO DIFF WBC: CPT

## 2020-02-07 LAB
ALBUMIN SERPL-MCNC: 4.1 G/DL (ref 3.5–5.2)
ALP BLD-CCNC: 83 U/L (ref 35–104)
ALT SERPL-CCNC: 23 U/L (ref 5–33)
ANION GAP SERPL CALCULATED.3IONS-SCNC: 15 MMOL/L (ref 7–19)
AST SERPL-CCNC: 18 U/L (ref 5–32)
BASOPHILS ABSOLUTE: 0 K/UL (ref 0–0.2)
BASOPHILS RELATIVE PERCENT: 0.6 % (ref 0–1)
BILIRUB SERPL-MCNC: 0.3 MG/DL (ref 0.2–1.2)
BUN BLDV-MCNC: 12 MG/DL (ref 6–20)
CALCIUM SERPL-MCNC: 9.3 MG/DL (ref 8.6–10)
CHLORIDE BLD-SCNC: 98 MMOL/L (ref 98–111)
CHOLESTEROL, TOTAL: 194 MG/DL (ref 160–199)
CO2: 27 MMOL/L (ref 22–29)
CREAT SERPL-MCNC: 0.7 MG/DL (ref 0.5–0.9)
EOSINOPHILS ABSOLUTE: 0.2 K/UL (ref 0–0.6)
EOSINOPHILS RELATIVE PERCENT: 2.7 % (ref 0–5)
FERRITIN: 885.2 NG/ML (ref 13–150)
GFR NON-AFRICAN AMERICAN: >60
GLUCOSE BLD-MCNC: 120 MG/DL (ref 74–109)
HBA1C MFR BLD: 5.8 % (ref 4–6)
HCT VFR BLD CALC: 39.8 % (ref 37–47)
HDLC SERPL-MCNC: 53 MG/DL (ref 65–121)
HEMOGLOBIN: 13.3 G/DL (ref 12–16)
IMMATURE GRANULOCYTES #: 0 K/UL
IRON: 81 UG/DL (ref 37–145)
LDL CHOLESTEROL CALCULATED: 120 MG/DL
LYMPHOCYTES ABSOLUTE: 2 K/UL (ref 1.1–4.5)
LYMPHOCYTES RELATIVE PERCENT: 28.2 % (ref 20–40)
MCH RBC QN AUTO: 30.9 PG (ref 27–31)
MCHC RBC AUTO-ENTMCNC: 33.4 G/DL (ref 33–37)
MCV RBC AUTO: 92.3 FL (ref 81–99)
MONOCYTES ABSOLUTE: 0.5 K/UL (ref 0–0.9)
MONOCYTES RELATIVE PERCENT: 7.2 % (ref 0–10)
NEUTROPHILS ABSOLUTE: 4.3 K/UL (ref 1.5–7.5)
NEUTROPHILS RELATIVE PERCENT: 60.7 % (ref 50–65)
PDW BLD-RTO: 12.3 % (ref 11.5–14.5)
PLATELET # BLD: 222 K/UL (ref 130–400)
PMV BLD AUTO: 9.8 FL (ref 9.4–12.3)
POTASSIUM SERPL-SCNC: 3.5 MMOL/L (ref 3.5–5)
RBC # BLD: 4.31 M/UL (ref 4.2–5.4)
SODIUM BLD-SCNC: 140 MMOL/L (ref 136–145)
T4 FREE: 0.93 NG/DL (ref 0.93–1.7)
TOTAL IRON BINDING CAPACITY: 223 UG/DL (ref 250–400)
TOTAL PROTEIN: 7 G/DL (ref 6.6–8.7)
TRIGL SERPL-MCNC: 105 MG/DL (ref 0–149)
TSH SERPL DL<=0.05 MIU/L-ACNC: 2.53 UIU/ML (ref 0.27–4.2)
VITAMIN D 25-HYDROXY: 27.3 NG/ML
WBC # BLD: 7.1 K/UL (ref 4.8–10.8)

## 2020-02-10 LAB
ORGANISM: ABNORMAL
URINE CULTURE, ROUTINE: ABNORMAL
URINE CULTURE, ROUTINE: ABNORMAL

## 2020-02-15 ENCOUNTER — OFFICE VISIT (OUTPATIENT)
Dept: URGENT CARE | Age: 55
End: 2020-02-15
Payer: COMMERCIAL

## 2020-02-15 VITALS
BODY MASS INDEX: 42.35 KG/M2 | OXYGEN SATURATION: 94 % | TEMPERATURE: 97.6 F | HEART RATE: 99 BPM | WEIGHT: 239 LBS | SYSTOLIC BLOOD PRESSURE: 120 MMHG | HEIGHT: 63 IN | DIASTOLIC BLOOD PRESSURE: 70 MMHG | RESPIRATION RATE: 18 BRPM

## 2020-02-15 LAB
INFLUENZA A ANTIBODY: NEGATIVE
INFLUENZA B ANTIBODY: NEGATIVE
S PYO AG THROAT QL: NORMAL

## 2020-02-15 PROCEDURE — 87804 INFLUENZA ASSAY W/OPTIC: CPT | Performed by: PHYSICIAN ASSISTANT

## 2020-02-15 PROCEDURE — 96372 THER/PROPH/DIAG INJ SC/IM: CPT | Performed by: PHYSICIAN ASSISTANT

## 2020-02-15 PROCEDURE — 99213 OFFICE O/P EST LOW 20 MIN: CPT | Performed by: PHYSICIAN ASSISTANT

## 2020-02-15 PROCEDURE — 87880 STREP A ASSAY W/OPTIC: CPT | Performed by: PHYSICIAN ASSISTANT

## 2020-02-15 RX ORDER — METHYLPREDNISOLONE ACETATE 80 MG/ML
80 INJECTION, SUSPENSION INTRA-ARTICULAR; INTRALESIONAL; INTRAMUSCULAR; SOFT TISSUE ONCE
Status: COMPLETED | OUTPATIENT
Start: 2020-02-15 | End: 2020-02-15

## 2020-02-15 RX ORDER — AMOXICILLIN AND CLAVULANATE POTASSIUM 875; 125 MG/1; MG/1
1 TABLET, FILM COATED ORAL 2 TIMES DAILY
Qty: 20 TABLET | Refills: 0 | Status: SHIPPED | OUTPATIENT
Start: 2020-02-15 | End: 2020-02-25

## 2020-02-15 RX ORDER — ALBUTEROL SULFATE 90 UG/1
2 AEROSOL, METERED RESPIRATORY (INHALATION) 4 TIMES DAILY PRN
Qty: 1 INHALER | Refills: 0 | Status: SHIPPED | OUTPATIENT
Start: 2020-02-15

## 2020-02-15 RX ORDER — FLUCONAZOLE 150 MG/1
TABLET ORAL
Qty: 2 TABLET | Refills: 0 | Status: SHIPPED | OUTPATIENT
Start: 2020-02-15 | End: 2022-05-26

## 2020-02-15 RX ORDER — METHYLPREDNISOLONE ACETATE 80 MG/ML
80 INJECTION, SUSPENSION INTRA-ARTICULAR; INTRALESIONAL; INTRAMUSCULAR; SOFT TISSUE ONCE
Qty: 1 ML | Refills: 0
Start: 2020-02-15 | End: 2020-02-15 | Stop reason: CLARIF

## 2020-02-15 RX ADMIN — METHYLPREDNISOLONE ACETATE 80 MG: 80 INJECTION, SUSPENSION INTRA-ARTICULAR; INTRALESIONAL; INTRAMUSCULAR; SOFT TISSUE at 14:02

## 2020-02-15 NOTE — LETTER
[facility]  [address] [place] [id number]  Phone: [phone]    Carlyn Isabel        February 15, 2020     Patient: Oliver Lujan   YOB: 1965   Date of Visit: 2/15/2020       To Whom it May Concern:    Oliver Lujan was seen in my clinic on 2/15/2020. She may return to work on 2/19/2020. If you have any questions or concerns, please don't hesitate to call.     Sincerely,         Jovita Rosas PA-C

## 2020-02-17 ASSESSMENT — ENCOUNTER SYMPTOMS
WHEEZING: 1
SINUS PAIN: 1
COUGH: 1
SINUS PRESSURE: 1

## 2020-07-17 ENCOUNTER — EMPLOYEE WELLNESS (OUTPATIENT)
Dept: OTHER | Age: 55
End: 2020-07-17

## 2020-07-17 LAB
CHOLESTEROL, TOTAL: 202 MG/DL (ref 160–199)
GLUCOSE BLD-MCNC: 100 MG/DL (ref 74–109)
HDLC SERPL-MCNC: 53 MG/DL (ref 65–121)
LDL CHOLESTEROL CALCULATED: 126 MG/DL
TRIGL SERPL-MCNC: 115 MG/DL (ref 0–149)

## 2020-07-28 LAB
T4 FREE: 1.05 NG/DL (ref 0.93–1.7)
TSH SERPL DL<=0.05 MIU/L-ACNC: 1.32 UIU/ML (ref 0.27–4.2)

## 2020-07-30 LAB — T3 TOTAL: 88 NG/DL (ref 80–200)

## 2020-08-18 ENCOUNTER — HOSPITAL ENCOUNTER (OUTPATIENT)
Dept: WOMENS IMAGING | Age: 55
Discharge: HOME | End: 2020-08-18
Payer: COMMERCIAL

## 2020-08-18 ENCOUNTER — APPOINTMENT (OUTPATIENT)
Dept: WOMENS IMAGING | Age: 55
End: 2020-08-18
Payer: COMMERCIAL

## 2020-08-18 ENCOUNTER — HOSPITAL ENCOUNTER (OUTPATIENT)
Dept: WOMENS IMAGING | Age: 55
End: 2020-08-18
Payer: COMMERCIAL

## 2020-08-18 LAB
ALBUMIN SERPL-MCNC: 4.1 G/DL (ref 3.5–5.2)
ALP BLD-CCNC: 83 U/L (ref 35–104)
ALT SERPL-CCNC: 31 U/L (ref 5–33)
ANION GAP SERPL CALCULATED.3IONS-SCNC: 15 MMOL/L (ref 7–19)
AST SERPL-CCNC: 24 U/L (ref 5–32)
BASOPHILS ABSOLUTE: 0 K/UL (ref 0–0.2)
BASOPHILS RELATIVE PERCENT: 0.7 % (ref 0–1)
BILIRUB SERPL-MCNC: 0.3 MG/DL (ref 0.2–1.2)
BUN BLDV-MCNC: 9 MG/DL (ref 6–20)
CALCIUM SERPL-MCNC: 9.3 MG/DL (ref 8.6–10)
CHLORIDE BLD-SCNC: 99 MMOL/L (ref 98–111)
CHOLESTEROL, TOTAL: 179 MG/DL (ref 160–199)
CO2: 29 MMOL/L (ref 22–29)
CREAT SERPL-MCNC: 0.7 MG/DL (ref 0.5–0.9)
EOSINOPHILS ABSOLUTE: 0.2 K/UL (ref 0–0.6)
EOSINOPHILS RELATIVE PERCENT: 3.2 % (ref 0–5)
ESTRADIOL LEVEL: 27 PG/ML
FOLLICLE STIMULATING HORMONE: 72.6 MIU/ML
GFR AFRICAN AMERICAN: >59
GFR NON-AFRICAN AMERICAN: >60
GLUCOSE BLD-MCNC: 107 MG/DL (ref 74–109)
HCT VFR BLD CALC: 38.6 % (ref 37–47)
HDLC SERPL-MCNC: 55 MG/DL (ref 65–121)
HEMOGLOBIN: 12.9 G/DL (ref 12–16)
IMMATURE GRANULOCYTES #: 0 K/UL
LDL CHOLESTEROL CALCULATED: 103 MG/DL
LUTEINIZING HORMONE: 42.2 MIU/ML
LYMPHOCYTES ABSOLUTE: 1.7 K/UL (ref 1.1–4.5)
LYMPHOCYTES RELATIVE PERCENT: 29.1 % (ref 20–40)
MCH RBC QN AUTO: 30.8 PG (ref 27–31)
MCHC RBC AUTO-ENTMCNC: 33.4 G/DL (ref 33–37)
MCV RBC AUTO: 92.1 FL (ref 81–99)
MONOCYTES ABSOLUTE: 0.4 K/UL (ref 0–0.9)
MONOCYTES RELATIVE PERCENT: 6.6 % (ref 0–10)
NEUTROPHILS ABSOLUTE: 3.5 K/UL (ref 1.5–7.5)
NEUTROPHILS RELATIVE PERCENT: 59.9 % (ref 50–65)
PDW BLD-RTO: 12.5 % (ref 11.5–14.5)
PLATELET # BLD: 228 K/UL (ref 130–400)
PMV BLD AUTO: 10.4 FL (ref 9.4–12.3)
POTASSIUM SERPL-SCNC: 3.2 MMOL/L (ref 3.5–5)
RBC # BLD: 4.19 M/UL (ref 4.2–5.4)
SODIUM BLD-SCNC: 143 MMOL/L (ref 136–145)
T4 FREE: 1.02 NG/DL (ref 0.93–1.7)
TESTOSTERONE TOTAL: 12.3 NG/DL (ref 2.9–40.8)
TOTAL PROTEIN: 6.7 G/DL (ref 6.6–8.7)
TRIGL SERPL-MCNC: 105 MG/DL (ref 0–149)
TSH SERPL DL<=0.05 MIU/L-ACNC: 0.76 UIU/ML (ref 0.27–4.2)
WBC # BLD: 5.9 K/UL (ref 4.8–10.8)

## 2020-08-18 PROCEDURE — 77066 DX MAMMO INCL CAD BI: CPT

## 2020-09-14 LAB — PROLACTIN: 10.41 NG/ML (ref 4.79–23.3)

## 2020-10-19 VITALS — BODY MASS INDEX: 42.87 KG/M2 | WEIGHT: 242 LBS

## 2021-01-29 ENCOUNTER — HOSPITAL ENCOUNTER (OUTPATIENT)
Dept: INFUSION THERAPY | Age: 56
Setting detail: INFUSION SERIES
Discharge: HOME | End: 2021-01-29
Payer: COMMERCIAL

## 2021-01-29 VITALS
DIASTOLIC BLOOD PRESSURE: 92 MMHG | SYSTOLIC BLOOD PRESSURE: 139 MMHG | HEART RATE: 85 BPM | TEMPERATURE: 97 F | RESPIRATION RATE: 16 BRPM | OXYGEN SATURATION: 96 %

## 2021-01-29 DIAGNOSIS — U07.1 COVID-19: ICD-10-CM

## 2021-01-29 DIAGNOSIS — U07.1 COVID-19: Primary | ICD-10-CM

## 2021-01-29 PROCEDURE — 2580000003 HC RX 258: Performed by: FAMILY MEDICINE

## 2021-01-29 PROCEDURE — 6370000000 HC RX 637 (ALT 250 FOR IP): Performed by: FAMILY MEDICINE

## 2021-01-29 PROCEDURE — 2500000003 HC RX 250 WO HCPCS: Performed by: FAMILY MEDICINE

## 2021-01-29 PROCEDURE — 6360000002 HC RX W HCPCS: Performed by: FAMILY MEDICINE

## 2021-01-29 PROCEDURE — 96365 THER/PROPH/DIAG IV INF INIT: CPT

## 2021-01-29 RX ORDER — SODIUM CHLORIDE 9 MG/ML
INJECTION, SOLUTION INTRAVENOUS CONTINUOUS
Status: CANCELLED | OUTPATIENT
Start: 2021-01-29

## 2021-01-29 RX ORDER — HEPARIN SODIUM (PORCINE) LOCK FLUSH IV SOLN 100 UNIT/ML 100 UNIT/ML
500 SOLUTION INTRAVENOUS PRN
Status: CANCELLED | OUTPATIENT
Start: 2021-01-29

## 2021-01-29 RX ORDER — ACETAMINOPHEN 325 MG/1
650 TABLET ORAL ONCE
Status: CANCELLED | OUTPATIENT
Start: 2021-01-29

## 2021-01-29 RX ORDER — DIPHENHYDRAMINE HYDROCHLORIDE 50 MG/ML
25 INJECTION INTRAMUSCULAR; INTRAVENOUS ONCE
Status: CANCELLED | OUTPATIENT
Start: 2021-01-29

## 2021-01-29 RX ORDER — METHYLPREDNISOLONE SODIUM SUCCINATE 125 MG/2ML
125 INJECTION, POWDER, LYOPHILIZED, FOR SOLUTION INTRAMUSCULAR; INTRAVENOUS ONCE
Status: CANCELLED | OUTPATIENT
Start: 2021-01-29 | End: 2021-01-29

## 2021-01-29 RX ORDER — SODIUM CHLORIDE 0.9 % (FLUSH) 0.9 %
5 SYRINGE (ML) INJECTION PRN
Status: CANCELLED | OUTPATIENT
Start: 2021-01-29

## 2021-01-29 RX ORDER — DIPHENHYDRAMINE HYDROCHLORIDE 50 MG/ML
50 INJECTION INTRAMUSCULAR; INTRAVENOUS ONCE
Status: CANCELLED | OUTPATIENT
Start: 2021-01-29 | End: 2021-01-29

## 2021-01-29 RX ORDER — EPINEPHRINE 1 MG/ML
0.3 INJECTION, SOLUTION, CONCENTRATE INTRAVENOUS PRN
Status: CANCELLED | OUTPATIENT
Start: 2021-01-29

## 2021-01-29 RX ORDER — SODIUM CHLORIDE 9 MG/ML
INJECTION, SOLUTION INTRAVENOUS CONTINUOUS
Status: ACTIVE | OUTPATIENT
Start: 2021-01-29 | End: 2021-01-29

## 2021-01-29 RX ORDER — SODIUM CHLORIDE 0.9 % (FLUSH) 0.9 %
10 SYRINGE (ML) INJECTION PRN
Status: CANCELLED | OUTPATIENT
Start: 2021-01-29

## 2021-01-29 RX ORDER — DIPHENHYDRAMINE HYDROCHLORIDE 50 MG/ML
25 INJECTION INTRAMUSCULAR; INTRAVENOUS ONCE
Status: COMPLETED | OUTPATIENT
Start: 2021-01-29 | End: 2021-01-29

## 2021-01-29 RX ORDER — ACETAMINOPHEN 325 MG/1
650 TABLET ORAL ONCE
Status: COMPLETED | OUTPATIENT
Start: 2021-01-29 | End: 2021-01-29

## 2021-01-29 RX ORDER — SODIUM CHLORIDE 0.9 % (FLUSH) 0.9 %
10 SYRINGE (ML) INJECTION PRN
Status: DISCONTINUED | OUTPATIENT
Start: 2021-01-29 | End: 2021-01-30 | Stop reason: HOSPADM

## 2021-01-29 RX ADMIN — ACETAMINOPHEN 650 MG: 325 TABLET ORAL at 14:19

## 2021-01-29 RX ADMIN — SODIUM CHLORIDE: 9 INJECTION, SOLUTION INTRAVENOUS at 14:19

## 2021-01-29 RX ADMIN — IMDEVIMAB: 1332 INJECTION, SOLUTION, CONCENTRATE INTRAVENOUS at 14:20

## 2021-01-29 RX ADMIN — DIPHENHYDRAMINE HYDROCHLORIDE 25 MG: 50 INJECTION, SOLUTION INTRAMUSCULAR; INTRAVENOUS at 14:19

## 2021-01-29 ASSESSMENT — PAIN SCALES - GENERAL: PAINLEVEL_OUTOF10: 0

## 2021-01-29 NOTE — PROGRESS NOTES
Patient given FACT SHEET for EMERGENCY USE AUTHORIZATION FOR CASIRIVIMAB AND IMDEVIMAB. Pt verbalizes understanding of EMERGENCY USE AUTHORIZATION. Pt verbalizes understanding that  CASIRIVIMAB and IMDEVIMAB are invesitgational because they are still being studied. Pt verbalizes understanding regarding known safety and effectiveness and understands limited data is available regarding risks and benefits. Pt understands there is a continued need to self-isolate and continue all infection control measures. Pt. chooses to proceed with CASIRIVIMAB and IMDEVIMAB infusion therapy. Pt signed a copy of this information.  Electronically signed by Keanu Barr RN on 1/29/2021 at 1:56 PM

## 2021-01-29 NOTE — PROGRESS NOTES
Patient tolerated monoclonal antibody infusion therapy without s/s of adverse reaction. Patient observed one hour post observation. Patient discharged to home. Discharge instructions provided. Patient verbalizes understanding of need to seek emergent medical care in the event of adverse reaction. Patient ambulated from infusion center to vehicle with a RN present.  Electronically signed by Chriss Ruff RN on 1/29/2021 at 4:52 PM

## 2021-09-27 ENCOUNTER — HOSPITAL ENCOUNTER (OUTPATIENT)
Dept: GENERAL RADIOLOGY | Age: 56
Discharge: HOME | End: 2021-09-27
Payer: COMMERCIAL

## 2021-09-27 DIAGNOSIS — G50.1 ATYPICAL FACIAL PAIN: ICD-10-CM

## 2021-09-27 PROCEDURE — 70220 X-RAY EXAM OF SINUSES: CPT

## 2021-09-28 LAB
ALBUMIN SERPL-MCNC: 4 G/DL (ref 3.5–5.2)
ALP BLD-CCNC: 92 U/L (ref 35–104)
ALT SERPL-CCNC: 23 U/L (ref 5–33)
ANION GAP SERPL CALCULATED.3IONS-SCNC: 12 MMOL/L (ref 7–19)
AST SERPL-CCNC: 19 U/L (ref 5–32)
BASOPHILS ABSOLUTE: 0 K/UL (ref 0–0.2)
BASOPHILS RELATIVE PERCENT: 0.5 % (ref 0–1)
BILIRUB SERPL-MCNC: <0.2 MG/DL (ref 0.2–1.2)
BUN BLDV-MCNC: 14 MG/DL (ref 6–20)
CALCIUM SERPL-MCNC: 9.3 MG/DL (ref 8.6–10)
CHLORIDE BLD-SCNC: 102 MMOL/L (ref 98–111)
CHOLESTEROL, TOTAL: 208 MG/DL (ref 160–199)
CO2: 29 MMOL/L (ref 22–29)
CREAT SERPL-MCNC: 0.9 MG/DL (ref 0.5–0.9)
EOSINOPHILS ABSOLUTE: 0.3 K/UL (ref 0–0.6)
EOSINOPHILS RELATIVE PERCENT: 4 % (ref 0–5)
GFR AFRICAN AMERICAN: >59
GFR NON-AFRICAN AMERICAN: >60
GLUCOSE BLD-MCNC: 121 MG/DL (ref 74–109)
HBA1C MFR BLD: 5.8 % (ref 4–6)
HCT VFR BLD CALC: 39.1 % (ref 37–47)
HDLC SERPL-MCNC: 53 MG/DL (ref 65–121)
HEMOGLOBIN: 12.9 G/DL (ref 12–16)
IMMATURE GRANULOCYTES #: 0 K/UL
LDL CHOLESTEROL CALCULATED: 127 MG/DL
LYMPHOCYTES ABSOLUTE: 2.5 K/UL (ref 1.1–4.5)
LYMPHOCYTES RELATIVE PERCENT: 38.9 % (ref 20–40)
MCH RBC QN AUTO: 31.1 PG (ref 27–31)
MCHC RBC AUTO-ENTMCNC: 33 G/DL (ref 33–37)
MCV RBC AUTO: 94.2 FL (ref 81–99)
MONOCYTES ABSOLUTE: 0.5 K/UL (ref 0–0.9)
MONOCYTES RELATIVE PERCENT: 7.7 % (ref 0–10)
NEUTROPHILS ABSOLUTE: 3.2 K/UL (ref 1.5–7.5)
NEUTROPHILS RELATIVE PERCENT: 48.6 % (ref 50–65)
PDW BLD-RTO: 12.2 % (ref 11.5–14.5)
PLATELET # BLD: 222 K/UL (ref 130–400)
PMV BLD AUTO: 10 FL (ref 9.4–12.3)
POTASSIUM SERPL-SCNC: 3.6 MMOL/L (ref 3.5–5)
PRO-BNP: 50 PG/ML (ref 0–900)
RBC # BLD: 4.15 M/UL (ref 4.2–5.4)
SODIUM BLD-SCNC: 143 MMOL/L (ref 136–145)
T4 FREE: 0.9 NG/DL (ref 0.93–1.7)
TOTAL PROTEIN: 6.6 G/DL (ref 6.6–8.7)
TRIGL SERPL-MCNC: 138 MG/DL (ref 0–149)
TSH SERPL DL<=0.05 MIU/L-ACNC: 2.2 UIU/ML (ref 0.27–4.2)
WBC # BLD: 6.5 K/UL (ref 4.8–10.8)

## 2021-11-23 ENCOUNTER — HOSPITAL ENCOUNTER (OUTPATIENT)
Dept: WOMENS IMAGING | Age: 56
Discharge: HOME | End: 2021-11-23
Payer: COMMERCIAL

## 2021-11-23 DIAGNOSIS — Z12.31 ENCOUNTER FOR SCREENING MAMMOGRAM FOR MALIGNANT NEOPLASM OF BREAST: ICD-10-CM

## 2021-11-23 PROCEDURE — 77067 SCR MAMMO BI INCL CAD: CPT

## 2022-01-14 LAB
ALBUMIN SERPL-MCNC: 3.9 G/DL (ref 3.5–5.2)
ALP BLD-CCNC: 82 U/L (ref 35–104)
ALT SERPL-CCNC: 24 U/L (ref 5–33)
ANION GAP SERPL CALCULATED.3IONS-SCNC: 12 MMOL/L (ref 7–19)
AST SERPL-CCNC: 20 U/L (ref 5–32)
BILIRUB SERPL-MCNC: 0.3 MG/DL (ref 0.2–1.2)
BUN BLDV-MCNC: 12 MG/DL (ref 6–20)
CALCIUM SERPL-MCNC: 9.3 MG/DL (ref 8.6–10)
CHLORIDE BLD-SCNC: 103 MMOL/L (ref 98–111)
CO2: 29 MMOL/L (ref 22–29)
CREAT SERPL-MCNC: 0.8 MG/DL (ref 0.5–0.9)
GFR AFRICAN AMERICAN: >59
GFR NON-AFRICAN AMERICAN: >60
GLUCOSE BLD-MCNC: 129 MG/DL (ref 74–109)
HBA1C MFR BLD: 5.8 % (ref 4–6)
POTASSIUM SERPL-SCNC: 3.6 MMOL/L (ref 3.5–5)
SODIUM BLD-SCNC: 144 MMOL/L (ref 136–145)
TOTAL PROTEIN: 6.8 G/DL (ref 6.6–8.7)

## 2022-05-10 LAB
CHOLESTEROL, TOTAL: 214 MG/DL (ref 160–199)
GLUCOSE BLD-MCNC: 118 MG/DL (ref 74–109)
HDLC SERPL-MCNC: 51 MG/DL (ref 65–121)
LDL CHOLESTEROL CALCULATED: 135 MG/DL
TRIGL SERPL-MCNC: 142 MG/DL (ref 0–149)

## 2022-05-26 ENCOUNTER — OFFICE VISIT (OUTPATIENT)
Age: 57
End: 2022-05-26
Payer: COMMERCIAL

## 2022-05-26 VITALS
RESPIRATION RATE: 14 BRPM | HEIGHT: 63 IN | DIASTOLIC BLOOD PRESSURE: 82 MMHG | OXYGEN SATURATION: 98 % | BODY MASS INDEX: 42.35 KG/M2 | WEIGHT: 239 LBS | HEART RATE: 88 BPM | TEMPERATURE: 98 F | SYSTOLIC BLOOD PRESSURE: 134 MMHG

## 2022-05-26 DIAGNOSIS — B96.89 SINUSITIS, BACTERIAL: Primary | ICD-10-CM

## 2022-05-26 DIAGNOSIS — B37.41 YEAST CYSTITIS: Primary | ICD-10-CM

## 2022-05-26 DIAGNOSIS — J32.9 SINUSITIS, BACTERIAL: Primary | ICD-10-CM

## 2022-05-26 PROCEDURE — 99213 OFFICE O/P EST LOW 20 MIN: CPT | Performed by: NURSE PRACTITIONER

## 2022-05-26 RX ORDER — FLUCONAZOLE 100 MG/1
150 TABLET ORAL ONCE
Qty: 2 TABLET | Refills: 0 | Status: SHIPPED | OUTPATIENT
Start: 2022-05-26 | End: 2022-05-26

## 2022-05-26 RX ORDER — METHYLPREDNISOLONE 4 MG/1
TABLET ORAL
Qty: 1 KIT | Refills: 0 | Status: SHIPPED | OUTPATIENT
Start: 2022-05-26 | End: 2022-06-01

## 2022-05-26 RX ORDER — GUAIFENESIN 100 MG/5ML
10 SYRUP ORAL EVERY 4 HOURS PRN
Qty: 118 ML | Refills: 0 | Status: SHIPPED | OUTPATIENT
Start: 2022-05-26 | End: 2022-05-26

## 2022-05-26 RX ORDER — FLUTICASONE PROPIONATE 50 MCG
1 SPRAY, SUSPENSION (ML) NASAL DAILY
Qty: 32 G | Refills: 1 | Status: SHIPPED | OUTPATIENT
Start: 2022-05-26

## 2022-05-26 RX ORDER — GUAIFENESIN 100 MG/5ML
10 SYRUP ORAL EVERY 4 HOURS PRN
Qty: 118 ML | Refills: 0 | Status: SHIPPED | OUTPATIENT
Start: 2022-05-26

## 2022-05-26 RX ORDER — AMOXICILLIN AND CLAVULANATE POTASSIUM 875; 125 MG/1; MG/1
1 TABLET, FILM COATED ORAL 2 TIMES DAILY
Qty: 20 TABLET | Refills: 0 | Status: SHIPPED | OUTPATIENT
Start: 2022-05-26 | End: 2022-06-05

## 2022-05-26 ASSESSMENT — ENCOUNTER SYMPTOMS
SINUS PRESSURE: 1
EYES NEGATIVE: 1
TROUBLE SWALLOWING: 0
VOICE CHANGE: 0
SINUS PAIN: 1
SORE THROAT: 0
GASTROINTESTINAL NEGATIVE: 1
RHINORRHEA: 0
RESPIRATORY NEGATIVE: 1
FACIAL SWELLING: 0
ALLERGIC/IMMUNOLOGIC NEGATIVE: 1

## 2022-05-26 NOTE — PROGRESS NOTES
[id number]  [address] [facility] [address] [id number]  Dept: [phone]  Dept Fax: [phone]  Loc: [phone]     Jordan Marlow is a 60 y.o. female who presents today for her medical conditions/complaintsas noted below. Jordan Marlow is c/o of Nasal Congestion, Cough, Otalgia, and Sinus Problem (left side of face )        HPI:     HPI      Sinusitis  This is a new problem. The current episode started more than 10 days ago. The problem is worsened. There has been no fever. The pain is mild. Associated symptoms include congestion, coughing, ear pain, headaches,  sinus pressure and pain, and post nasal drip. Pertinent negatives include  chills, diaphoresis, fatigue, dizziness,  neck pain, shortness of breath or sneezing. Past treatments include oral decongestants. The treatment provided mild relief.         Past Medical History:   Diagnosis Date    Anxiety     Bulging lumbar disc     L3-L4    Depression     GERD (gastroesophageal reflux disease)     HTN (hypertension)     Hyperlipidemia     Hypothyroid     Iron deficiency anemia       Past Surgical History:   Procedure Laterality Date    BREAST BIOPSY Right 2010    benign    CHOLECYSTECTOMY      HYSTERECTOMY  2008    OVARY REMOVAL Bilateral 2008    age 37    MO COLONOSCOPY FLX DX W/COLLJ SPEC WHEN PFRMD N/A 6/11/2018    Dr Ursula Acosta, 5 yr recall    MO ESOPHAGOGASTRODUODENOSCOPY TRANSORAL DIAGNOSTIC N/A 6/11/2018    Dr Johnson Dugan (-)       Family History   Problem Relation Age of Onset    Diabetes Mother     Heart Disease Mother     Stroke Father     Diabetes Maternal Aunt     Stomach Cancer Maternal Aunt     Heart Disease Maternal Uncle     Other Brother     Colon Cancer Neg Hx     Colon Polyps Neg Hx     Esophageal Cancer Neg Hx     Liver Cancer Neg Hx     Liver Disease Neg Hx     Rectal Cancer Neg Hx        Social History     Tobacco Use    Smoking status: Never Smoker    Smokeless tobacco: Never Used   Substance Use Topics    Alcohol use: No     Alcohol/week: 0.0 standard drinks      Current Outpatient Medications   Medication Sig Dispense Refill    amoxicillin-clavulanate (AUGMENTIN) 875-125 MG per tablet Take 1 tablet by mouth 2 times daily for 10 days 20 tablet 0    fluticasone (FLONASE) 50 MCG/ACT nasal spray 1 spray by Each Nostril route daily 32 g 1    methylPREDNISolone (MEDROL DOSEPACK) 4 MG tablet Take by mouth. 1 kit 0    guaiFENesin (ALTARUSSIN) 100 MG/5ML syrup Take 10 mLs by mouth every 4 hours as needed for Cough 118 mL 0    irbesartan-hydrochlorothiazide (AVALIDE) 150-12.5 MG per tablet Take 1 tablet by mouth daily       ferrous sulfate 325 (65 Fe) MG tablet Take 1 tablet by mouth 3 times daily (with meals) 30 tablet 3    Melatonin 10 MG TABS Take by mouth nightly       cetirizine (ZYRTEC ALLERGY) 10 MG tablet Take 10 mg by mouth daily      omeprazole (PRILOSEC) 40 MG capsule Take 20 mg by mouth daily       QUEtiapine (SEROQUEL) 300 MG tablet Take 600 mg by mouth nightly      levothyroxine (SYNTHROID) 50 MCG tablet Take 50 mcg by mouth Daily      ALPRAZolam (XANAX) 0.5 MG tablet Take 0.5 mg by mouth nightly as needed for Sleep      albuterol sulfate  (90 Base) MCG/ACT inhaler Inhale 2 puffs into the lungs 4 times daily as needed for Wheezing (Patient not taking: Reported on 5/26/2022) 1 Inhaler 0    fluconazole (DIFLUCAN) 150 MG tablet Take 1 tablet by mouth on day 5 and day 10 of antibiotics.  (Patient not taking: Reported on 5/26/2022) 2 tablet 0    meloxicam (MOBIC) 15 MG tablet Take 1 tablet by mouth daily (Patient not taking: Reported on 2/15/2020) 30 tablet 3    ondansetron (ZOFRAN ODT) 4 MG disintegrating tablet Take 1 tablet by mouth every 8 hours as needed for Nausea or Vomiting (Patient not taking: Reported on 2/15/2020) 30 tablet 0    promethazine-dextromethorphan (PROMETHAZINE-DM) 6.25-15 MG/5ML syrup Take 5 mLs by mouth nightly as needed for Cough (Patient not taking: Reported on 2/15/2020) 120 mL 0    sucralfate (CARAFATE) 1 GM tablet Take 1 g by mouth 4 times daily (Patient not taking: Reported on 5/26/2022)       No current facility-administered medications for this visit. No Known Allergies    Health Maintenance   Topic Date Due    COVID-19 Vaccine (1) Never done    Depression Monitoring  Never done    HIV screen  Never done    Hepatitis C screen  Never done    DTaP/Tdap/Td vaccine (1 - Tdap) Never done    Shingles vaccine (1 of 2) Never done    Flu vaccine (Season Ended) 09/01/2022    A1C test (Diabetic or Prediabetic)  01/14/2023    Colorectal Cancer Screen  06/11/2023    Breast cancer screen  11/23/2023    Lipids  05/10/2027    Hepatitis A vaccine  Aged Out    Hepatitis B vaccine  Aged Out    Hib vaccine  Aged Out    Meningococcal (ACWY) vaccine  Aged Out    Pneumococcal 0-64 years Vaccine  Aged Out       Subjective:     Review of Systems   Constitutional: Negative. HENT: Positive for congestion, sinus pressure, sinus pain and sneezing. Negative for ear discharge, facial swelling, rhinorrhea, sore throat, trouble swallowing and voice change. Eyes: Negative. Respiratory: Negative. Cardiovascular: Negative. Gastrointestinal: Negative. Genitourinary: Negative. Musculoskeletal: Negative. Skin: Negative. Allergic/Immunologic: Negative. Negative for environmental allergies. Neurological: Positive for headaches. Psychiatric/Behavioral: Negative. Objective:     Physical Exam  Vitals and nursing note reviewed. Constitutional:       Appearance: She is well-developed. She is not ill-appearing or toxic-appearing. HENT:      Head: Normocephalic and atraumatic.       Right Ear: Hearing, tympanic membrane, ear canal and external ear normal.      Left Ear: Hearing, tympanic membrane, ear canal and external ear normal.      Nose:      Right Sinus: Maxillary sinus tenderness present. Left Sinus: Maxillary sinus tenderness present. Mouth/Throat:      Mouth: Mucous membranes are moist.      Pharynx: Oropharynx is clear. Uvula midline. Eyes:      Conjunctiva/sclera: Conjunctivae normal.      Pupils: Pupils are equal, round, and reactive to light. Neck:      Trachea: Trachea normal.   Cardiovascular:      Rate and Rhythm: Normal rate and regular rhythm. Heart sounds: Normal heart sounds. Pulmonary:      Effort: Pulmonary effort is normal.      Breath sounds: No wheezing, rhonchi or rales. Abdominal:      General: Bowel sounds are normal.   Musculoskeletal:         General: Normal range of motion. Cervical back: Normal range of motion. Lymphadenopathy:      Head:      Right side of head: No submental, submandibular, tonsillar, preauricular or occipital adenopathy. Left side of head: No submental, submandibular, tonsillar, preauricular, posterior auricular or occipital adenopathy. Cervical: No cervical adenopathy. Skin:     General: Skin is warm and moist.      Capillary Refill: Capillary refill takes less than 2 seconds. Neurological:      Mental Status: She is alert and oriented to person, place, and time. Psychiatric:         Speech: Speech normal.         Behavior: Behavior normal.         Thought Content: Thought content normal.         Judgment: Judgment normal.       /82   Pulse 88   Temp 98 °F (36.7 °C)   Resp 14   Ht 5' 3\" (1.6 m)   Wt 239 lb (108.4 kg)   SpO2 98%   BMI 42.34 kg/m²     Assessment:          Diagnosis Orders   1. Sinusitis, bacterial  amoxicillin-clavulanate (AUGMENTIN) 875-125 MG per tablet    fluticasone (FLONASE) 50 MCG/ACT nasal spray    methylPREDNISolone (MEDROL DOSEPACK) 4 MG tablet       Plan:    No orders of the defined types were placed in this encounter. No follow-ups on file. No orders of the defined types were placed in this encounter.     Orders Placed This Encounter   Medications    amoxicillin-clavulanate (AUGMENTIN) 875-125 MG per tablet     Sig: Take 1 tablet by mouth 2 times daily for 10 days     Dispense:  20 tablet     Refill:  0    fluticasone (FLONASE) 50 MCG/ACT nasal spray     Si spray by Each Nostril route daily     Dispense:  32 g     Refill:  1    methylPREDNISolone (MEDROL DOSEPACK) 4 MG tablet     Sig: Take by mouth. Dispense:  1 kit     Refill:  0    DISCONTD: guaiFENesin (ALTARUSSIN) 100 MG/5ML syrup     Sig: Take 10 mLs by mouth every 4 hours as needed for Cough     Dispense:  118 mL     Refill:  0    guaiFENesin (ALTARUSSIN) 100 MG/5ML syrup     Sig: Take 10 mLs by mouth every 4 hours as needed for Cough     Dispense:  118 mL     Refill:  0       Patient given educationalmaterials - see patient instructions. Discussed use, benefit, and side effectsof prescribed medications. All patient questions answered. Pt voiced understanding. Reviewed health maintenance. Instructed to continue current medications, diet andexercise. Patient agreed with treatment plan. Follow up as directed. Patient Instructions   1. Take medrol dose pack as prescribed  2. Take antihistamine and decongestant as prescribed  3. Take cough suppressants as prescribed  4. Increase fluids and rest  5.  Return to clinic if symptoms worsen or fail to improve          Electronically signed by CHONG Robles CNP on 2022 at 6:06 PM

## 2022-06-29 LAB
ALBUMIN SERPL-MCNC: 3.7 G/DL (ref 3.5–5.2)
ALP BLD-CCNC: 84 U/L (ref 35–104)
ALT SERPL-CCNC: 23 U/L (ref 5–33)
ANION GAP SERPL CALCULATED.3IONS-SCNC: 12 MMOL/L (ref 7–19)
AST SERPL-CCNC: 19 U/L (ref 5–32)
BASOPHILS ABSOLUTE: 0 K/UL (ref 0–0.2)
BASOPHILS RELATIVE PERCENT: 0.3 % (ref 0–1)
BILIRUB SERPL-MCNC: <0.2 MG/DL (ref 0.2–1.2)
BUN BLDV-MCNC: 9 MG/DL (ref 6–20)
CALCIUM SERPL-MCNC: 8.9 MG/DL (ref 8.6–10)
CHLORIDE BLD-SCNC: 104 MMOL/L (ref 98–111)
CHOLESTEROL, FASTING: 221 MG/DL (ref 160–199)
CO2: 27 MMOL/L (ref 22–29)
CREAT SERPL-MCNC: 0.8 MG/DL (ref 0.5–0.9)
EOSINOPHILS ABSOLUTE: 0.2 K/UL (ref 0–0.6)
EOSINOPHILS RELATIVE PERCENT: 3 % (ref 0–5)
GFR AFRICAN AMERICAN: >59
GFR NON-AFRICAN AMERICAN: >60
GLUCOSE BLD-MCNC: 124 MG/DL (ref 74–109)
HBA1C MFR BLD: 6.1 % (ref 4–6)
HCT VFR BLD CALC: 37.9 % (ref 37–47)
HDLC SERPL-MCNC: 55 MG/DL (ref 65–121)
HEMOGLOBIN: 12.4 G/DL (ref 12–16)
IMMATURE GRANULOCYTES #: 0 K/UL
LDL CHOLESTEROL CALCULATED: 141 MG/DL
LYMPHOCYTES ABSOLUTE: 2.1 K/UL (ref 1.1–4.5)
LYMPHOCYTES RELATIVE PERCENT: 35.6 % (ref 20–40)
MCH RBC QN AUTO: 31.1 PG (ref 27–31)
MCHC RBC AUTO-ENTMCNC: 32.7 G/DL (ref 33–37)
MCV RBC AUTO: 95 FL (ref 81–99)
MONOCYTES ABSOLUTE: 0.5 K/UL (ref 0–0.9)
MONOCYTES RELATIVE PERCENT: 8.3 % (ref 0–10)
NEUTROPHILS ABSOLUTE: 3.1 K/UL (ref 1.5–7.5)
NEUTROPHILS RELATIVE PERCENT: 52.3 % (ref 50–65)
PDW BLD-RTO: 12.7 % (ref 11.5–14.5)
PLATELET # BLD: 236 K/UL (ref 130–400)
PMV BLD AUTO: 10.2 FL (ref 9.4–12.3)
POTASSIUM SERPL-SCNC: 3.5 MMOL/L (ref 3.5–5)
RBC # BLD: 3.99 M/UL (ref 4.2–5.4)
SODIUM BLD-SCNC: 143 MMOL/L (ref 136–145)
T4 FREE: 0.75 NG/DL (ref 0.93–1.7)
TOTAL PROTEIN: 6.6 G/DL (ref 6.6–8.7)
TRIGLYCERIDE, FASTING: 123 MG/DL (ref 0–149)
TSH SERPL DL<=0.05 MIU/L-ACNC: 2.46 UIU/ML (ref 0.27–4.2)
VITAMIN D 25-HYDROXY: 40.2 NG/ML
WBC # BLD: 5.9 K/UL (ref 4.8–10.8)

## 2022-10-11 LAB
ALBUMIN SERPL-MCNC: 4.6 G/DL (ref 3.5–5.2)
ALP BLD-CCNC: 94 U/L (ref 35–104)
ALT SERPL-CCNC: 28 U/L (ref 5–33)
ANION GAP SERPL CALCULATED.3IONS-SCNC: 11 MMOL/L (ref 7–19)
AST SERPL-CCNC: 23 U/L (ref 5–32)
BASOPHILS ABSOLUTE: 0 K/UL (ref 0–0.2)
BASOPHILS RELATIVE PERCENT: 0.6 % (ref 0–1)
BILIRUB SERPL-MCNC: 0.4 MG/DL (ref 0.2–1.2)
BUN BLDV-MCNC: 11 MG/DL (ref 6–20)
CALCIUM SERPL-MCNC: 9.8 MG/DL (ref 8.6–10)
CHLORIDE BLD-SCNC: 96 MMOL/L (ref 98–111)
CHOLESTEROL, TOTAL: 228 MG/DL (ref 160–199)
CO2: 33 MMOL/L (ref 22–29)
CREAT SERPL-MCNC: 0.9 MG/DL (ref 0.5–0.9)
EOSINOPHILS ABSOLUTE: 0.2 K/UL (ref 0–0.6)
EOSINOPHILS RELATIVE PERCENT: 3.8 % (ref 0–5)
FERRITIN: 974.8 NG/ML (ref 13–150)
GFR AFRICAN AMERICAN: >59
GFR NON-AFRICAN AMERICAN: >60
GLUCOSE BLD-MCNC: 116 MG/DL (ref 74–109)
HCT VFR BLD CALC: 39.8 % (ref 37–47)
HDLC SERPL-MCNC: 65 MG/DL (ref 65–121)
HEMOGLOBIN: 13.2 G/DL (ref 12–16)
IMMATURE GRANULOCYTES #: 0 K/UL
IRON: 78 UG/DL (ref 37–145)
LDL CHOLESTEROL CALCULATED: 140 MG/DL
LYMPHOCYTES ABSOLUTE: 1.9 K/UL (ref 1.1–4.5)
LYMPHOCYTES RELATIVE PERCENT: 36.7 % (ref 20–40)
MCH RBC QN AUTO: 30.6 PG (ref 27–31)
MCHC RBC AUTO-ENTMCNC: 33.2 G/DL (ref 33–37)
MCV RBC AUTO: 92.3 FL (ref 81–99)
MONOCYTES ABSOLUTE: 0.4 K/UL (ref 0–0.9)
MONOCYTES RELATIVE PERCENT: 7.4 % (ref 0–10)
NEUTROPHILS ABSOLUTE: 2.7 K/UL (ref 1.5–7.5)
NEUTROPHILS RELATIVE PERCENT: 51.3 % (ref 50–65)
PDW BLD-RTO: 12.5 % (ref 11.5–14.5)
PLATELET # BLD: 222 K/UL (ref 130–400)
PMV BLD AUTO: 10.5 FL (ref 9.4–12.3)
POTASSIUM SERPL-SCNC: 3.4 MMOL/L (ref 3.5–5)
RBC # BLD: 4.31 M/UL (ref 4.2–5.4)
SODIUM BLD-SCNC: 140 MMOL/L (ref 136–145)
T4 FREE: 0.98 NG/DL (ref 0.93–1.7)
TOTAL IRON BINDING CAPACITY: 218 UG/DL (ref 250–400)
TOTAL PROTEIN: 6.8 G/DL (ref 6.6–8.7)
TRIGL SERPL-MCNC: 113 MG/DL (ref 0–149)
TSH SERPL DL<=0.05 MIU/L-ACNC: 1.84 UIU/ML (ref 0.27–4.2)
WBC # BLD: 5.3 K/UL (ref 4.8–10.8)

## 2022-11-28 LAB
ALBUMIN SERPL-MCNC: 4.1 G/DL (ref 3.5–5.2)
ALP BLD-CCNC: 93 U/L (ref 35–104)
ALT SERPL-CCNC: 24 U/L (ref 5–33)
ANION GAP SERPL CALCULATED.3IONS-SCNC: 12 MMOL/L (ref 7–19)
AST SERPL-CCNC: 23 U/L (ref 5–32)
BASOPHILS ABSOLUTE: 0 K/UL (ref 0–0.2)
BASOPHILS RELATIVE PERCENT: 0.6 % (ref 0–1)
BILIRUB SERPL-MCNC: 0.4 MG/DL (ref 0.2–1.2)
BUN BLDV-MCNC: 10 MG/DL (ref 6–20)
CALCIUM SERPL-MCNC: 9.7 MG/DL (ref 8.6–10)
CHLORIDE BLD-SCNC: 99 MMOL/L (ref 98–111)
CO2: 31 MMOL/L (ref 22–29)
CREAT SERPL-MCNC: 0.8 MG/DL (ref 0.5–0.9)
EOSINOPHILS ABSOLUTE: 0.2 K/UL (ref 0–0.6)
EOSINOPHILS RELATIVE PERCENT: 3 % (ref 0–5)
FERRITIN: 992.6 NG/ML (ref 13–150)
GFR SERPL CREATININE-BSD FRML MDRD: >60 ML/MIN/{1.73_M2}
GLUCOSE BLD-MCNC: 130 MG/DL (ref 74–109)
HCT VFR BLD CALC: 38.3 % (ref 37–47)
HEMOGLOBIN: 12.7 G/DL (ref 12–16)
IMMATURE GRANULOCYTES #: 0 K/UL
IRON: 69 UG/DL (ref 37–145)
LYMPHOCYTES ABSOLUTE: 1.8 K/UL (ref 1.1–4.5)
LYMPHOCYTES RELATIVE PERCENT: 25.5 % (ref 20–40)
MCH RBC QN AUTO: 30.5 PG (ref 27–31)
MCHC RBC AUTO-ENTMCNC: 33.2 G/DL (ref 33–37)
MCV RBC AUTO: 91.8 FL (ref 81–99)
MONOCYTES ABSOLUTE: 0.5 K/UL (ref 0–0.9)
MONOCYTES RELATIVE PERCENT: 6.5 % (ref 0–10)
NEUTROPHILS ABSOLUTE: 4.5 K/UL (ref 1.5–7.5)
NEUTROPHILS RELATIVE PERCENT: 64.1 % (ref 50–65)
PDW BLD-RTO: 13.1 % (ref 11.5–14.5)
PLATELET # BLD: 229 K/UL (ref 130–400)
PMV BLD AUTO: 10 FL (ref 9.4–12.3)
POTASSIUM SERPL-SCNC: 3.4 MMOL/L (ref 3.5–5)
RBC # BLD: 4.17 M/UL (ref 4.2–5.4)
SODIUM BLD-SCNC: 142 MMOL/L (ref 136–145)
TOTAL IRON BINDING CAPACITY: 212 UG/DL (ref 250–400)
TOTAL PROTEIN: 6.6 G/DL (ref 6.6–8.7)
WBC # BLD: 7 K/UL (ref 4.8–10.8)

## 2022-11-30 LAB
ALBUMIN SERPL-MCNC: 4.1 G/DL (ref 3.5–5.2)
ALP BLD-CCNC: 94 U/L (ref 35–104)
ALT SERPL-CCNC: 25 U/L (ref 5–33)
ANION GAP SERPL CALCULATED.3IONS-SCNC: 11 MMOL/L (ref 7–19)
AST SERPL-CCNC: 22 U/L (ref 5–32)
BILIRUB SERPL-MCNC: <0.2 MG/DL (ref 0.2–1.2)
BUN BLDV-MCNC: 10 MG/DL (ref 6–20)
CALCIUM SERPL-MCNC: 9.9 MG/DL (ref 8.6–10)
CHLORIDE BLD-SCNC: 98 MMOL/L (ref 98–111)
CO2: 31 MMOL/L (ref 22–29)
CREAT SERPL-MCNC: 0.8 MG/DL (ref 0.5–0.9)
GFR SERPL CREATININE-BSD FRML MDRD: >60 ML/MIN/{1.73_M2}
GLUCOSE BLD-MCNC: 111 MG/DL (ref 74–109)
HEPATITIS B SURFACE ANTIGEN INTERPRETATION: NORMAL
POTASSIUM SERPL-SCNC: 3.3 MMOL/L (ref 3.5–5)
SEDIMENTATION RATE, ERYTHROCYTE: 75 MM/HR (ref 0–25)
SODIUM BLD-SCNC: 140 MMOL/L (ref 136–145)
TOTAL CK: 100 U/L (ref 26–192)
TOTAL PROTEIN: 7.4 G/DL (ref 6.6–8.7)

## 2022-12-02 LAB
ALPHA-1 ANTITRYPSIN: 162 MG/DL (ref 90–200)
ANA IGG, ELISA: NORMAL
CERULOPLASMIN: 29 MG/DL (ref 16–45)
HEPATITIS B CORE TOTAL ANTIBODY: NEGATIVE

## 2022-12-03 LAB
COPPER: 140.3 UG/DL (ref 80–155)
F-ACTIN AB IGG: 5 UNITS (ref 0–19)
MITOCHONDRIAL M2 AB, IGG: 3.3 UNITS (ref 0–24.9)

## 2022-12-08 ENCOUNTER — HOSPITAL ENCOUNTER (OUTPATIENT)
Dept: ULTRASOUND IMAGING | Age: 57
Discharge: HOME | End: 2022-12-08
Payer: COMMERCIAL

## 2022-12-08 DIAGNOSIS — R79.89 ELEVATED LIVER FUNCTION TESTS: ICD-10-CM

## 2022-12-08 PROCEDURE — 76700 US EXAM ABDOM COMPLETE: CPT

## 2022-12-14 ENCOUNTER — OFFICE VISIT (OUTPATIENT)
Dept: GASTROENTEROLOGY | Age: 57
End: 2022-12-14
Payer: COMMERCIAL

## 2022-12-14 VITALS
OXYGEN SATURATION: 97 % | HEART RATE: 112 BPM | SYSTOLIC BLOOD PRESSURE: 115 MMHG | BODY MASS INDEX: 39.87 KG/M2 | HEIGHT: 63 IN | WEIGHT: 225 LBS | DIASTOLIC BLOOD PRESSURE: 70 MMHG

## 2022-12-14 DIAGNOSIS — R79.89 ELEVATED FERRITIN: ICD-10-CM

## 2022-12-14 DIAGNOSIS — K76.0 FATTY LIVER: Primary | ICD-10-CM

## 2022-12-14 LAB — FERRITIN: 899.4 NG/ML (ref 13–150)

## 2022-12-14 PROCEDURE — 3078F DIAST BP <80 MM HG: CPT | Performed by: NURSE PRACTITIONER

## 2022-12-14 PROCEDURE — 3074F SYST BP LT 130 MM HG: CPT | Performed by: NURSE PRACTITIONER

## 2022-12-14 PROCEDURE — 99214 OFFICE O/P EST MOD 30 MIN: CPT | Performed by: NURSE PRACTITIONER

## 2022-12-14 RX ORDER — PRAMIPEXOLE DIHYDROCHLORIDE 1 MG/1
1 TABLET ORAL 3 TIMES DAILY
COMMUNITY

## 2022-12-14 ASSESSMENT — ENCOUNTER SYMPTOMS
RECTAL PAIN: 0
SHORTNESS OF BREATH: 0
ANAL BLEEDING: 0
CONSTIPATION: 0
VOMITING: 0
DIARRHEA: 0
ABDOMINAL DISTENTION: 0
COUGH: 0
ABDOMINAL PAIN: 0
BLOOD IN STOOL: 0
NAUSEA: 0
CHOKING: 0
TROUBLE SWALLOWING: 0

## 2022-12-14 NOTE — PROGRESS NOTES
Subjective:     Patient ID: Jenna Phillips is a 59 y.o. female  PCP: Urbano Gu MD  Referring Provider: Darlyn Purvis MD    HPI  Patient presents to the office today with the following complaints: Abnormal Lab  Patient seen in the office today for elevated ferritin level and fatty liver. Reports she has a history of gastric ulcers, and rectal bleeding. Reports she is having yellow colored stools, that are normal consistency. She reports noticing blood occasionally when she wipes. US ABDOMEN COMPLETE 12/08/2022    Narrative  CLINICAL HISTORY: Elevated LFT  TECHNIQUE:  Real-time ultrasound of the abdomen (complete) with image documentation. COMPARISON:  FINDINGS:  Statements: None. Liver:  Image quality: Acceptable. Right hepatic lobe length: 19.1 cm  Parenchyma/Contour: Smooth hepatic contours. Diffuse increased echogenicity and  beam attenuation. Focal lesion: None detected. Main portal vein: Patent with normal (hepatopetal) flow. Biliary ducts: No intrahepatic or extrahepatic biliary dilation. Common duct  measures 0.4 cm in diameter at the nu hepatis. Gallbladder: Cholecystectomy. No abnormality in the gallbladder fossa. Pancreas: The visualized pancreas is unremarkable. Kidneys:  Right:  Location: Normal position. Length: 9.7 cm  Collecting system: No hydronephrosis. No calculi detected. Parenchyma: Appropriate cortical thickness. Normal echotexture. No discrete  solid renal mass. No cystic renal mass. Perinephric: No fluid collection. Left:  Location: Normal position. Length: 9.8 cm  Collecting system: No hydronephrosis. No calculi detected. Parenchyma: Appropriate cortical thickness. Normal echotexture. No discrete  solid renal mass. No cystic renal mass. Perinephric: No fluid collection. Spleen:  Size: Normal in size, 8.7 cm in length. Focal lesion: None. Vessels: The visualized IVC in the upper abdomen is unremarkable. The visualized  aorta in the upper abdomen is unremarkable. Other: No ascites in the visualized abdomen. Impression  Hepatomegaly and hepatic steatosis. Lab Results   Component Value Date    FERRITIN 992.6 (H) 11/28/2022      Assessment:     1. Fatty liver  2. Elevated ferritin  -     Ferritin; Future         Plan:   Repeat nivia level   No iron supplements   Low iron diet    Orders  Orders Placed This Encounter   Procedures    Ferritin     Standing Status:   Future     Standing Expiration Date:   12/14/2023     Medications  No orders of the defined types were placed in this encounter.         Patient History:     Past Medical History:   Diagnosis Date    Anxiety     Bulging lumbar disc     L3-L4    Depression     GERD (gastroesophageal reflux disease)     HTN (hypertension)     Hyperlipidemia     Hypothyroid     Iron deficiency anemia        Past Surgical History:   Procedure Laterality Date    BREAST BIOPSY Right 2010    benign    CHOLECYSTECTOMY      HYSTERECTOMY (CERVIX STATUS UNKNOWN)  2008    OVARY REMOVAL Bilateral 2008    age 37    [facility] COLONOSCOPY FLX DX W/COLLJ SPEC WHEN PFRMD N/A 6/11/2018    Dr Riley Sethi, 5 yr recall    [facility] ESOPHAGOGASTRODUODENOSCOPY TRANSORAL DIAGNOSTIC N/A 6/11/2018    Dr Gael Arteaga (-)       Family History   Problem Relation Age of Onset    Diabetes Mother     Heart Disease Mother     Colon Polyps Father     Stroke Father     Other Brother     Diabetes Maternal Aunt     Stomach Cancer Maternal Aunt     Heart Disease Maternal Uncle     Colon Cancer Neg Hx     Esophageal Cancer Neg Hx     Liver Cancer Neg Hx     Liver Disease Neg Hx     Rectal Cancer Neg Hx        Social History     Socioeconomic History    Marital status:    Tobacco Use    Smoking status: Never    Smokeless tobacco: Never   Vaping Use    Vaping Use: Never used   Substance and Sexual Activity    Alcohol use: No     Alcohol/week: 0.0 standard drinks    Drug use: No    Sexual activity: Yes     Partners: Male       Current Outpatient Medications Medication Sig Dispense Refill    pramipexole (MIRAPEX) 1 MG tablet Take 1 mg by mouth 3 times daily      fluticasone (FLONASE) 50 MCG/ACT nasal spray 1 spray by Each Nostril route daily 32 g 1    irbesartan-hydroCHLOROthiazide (AVALIDE) 150-12.5 MG per tablet Take 1 tablet by mouth daily       Melatonin 10 MG TABS Take by mouth nightly       cetirizine (ZYRTEC) 10 MG tablet Take 10 mg by mouth daily      omeprazole (PRILOSEC) 40 MG delayed release capsule Take 20 mg by mouth daily       QUEtiapine (SEROQUEL) 300 MG tablet Take 600 mg by mouth nightly      levothyroxine (SYNTHROID) 50 MCG tablet Take 50 mcg by mouth Daily      ALPRAZolam (XANAX) 0.5 MG tablet Take 0.5 mg by mouth nightly as needed for Sleep       No current facility-administered medications for this visit. No Known Allergies    Review of Systems   Constitutional:  Negative for activity change, appetite change, fatigue, fever and unexpected weight change. HENT:  Negative for trouble swallowing. Respiratory:  Negative for cough, choking and shortness of breath. Cardiovascular:  Negative for chest pain. Gastrointestinal:  Negative for abdominal distention, abdominal pain, anal bleeding, blood in stool, constipation, diarrhea, nausea, rectal pain and vomiting. Allergic/Immunologic: Negative for food allergies. All other systems reviewed and are negative. Objective:     /70 (Site: Left Upper Arm)   Pulse (!) 112   Ht 5' 3\" (1.6 m)   Wt 225 lb (102.1 kg)   SpO2 97%   BMI 39.86 kg/m²     Physical Exam  Vitals reviewed. Constitutional:       General: She is not in acute distress. Appearance: She is well-developed. HENT:      Head: Normocephalic and atraumatic. Right Ear: External ear normal.      Left Ear: External ear normal.      Nose: Nose normal.   Eyes:      General: No scleral icterus. Right eye: No discharge. Left eye: No discharge.       Conjunctiva/sclera: Conjunctivae normal. Pupils: Pupils are equal, round, and reactive to light. Cardiovascular:      Rate and Rhythm: Normal rate and regular rhythm. Heart sounds: Normal heart sounds. No murmur heard. Pulmonary:      Effort: Pulmonary effort is normal. No respiratory distress. Breath sounds: Normal breath sounds. No wheezing or rales. Abdominal:      General: Bowel sounds are normal. There is no distension. Palpations: Abdomen is soft. There is no mass. Tenderness: There is no abdominal tenderness. There is no guarding or rebound. Musculoskeletal:         General: Normal range of motion. Cervical back: Normal range of motion and neck supple. Skin:     General: Skin is warm and dry. Coloration: Skin is not pale. Neurological:      Mental Status: She is alert and oriented to person, place, and time.    Psychiatric:         Behavior: Behavior normal.

## 2023-01-04 ENCOUNTER — TELEPHONE (OUTPATIENT)
Dept: GASTROENTEROLOGY | Age: 58
End: 2023-01-04

## 2023-01-04 DIAGNOSIS — K76.0 FATTY LIVER: Primary | ICD-10-CM

## 2023-01-04 LAB
ALBUMIN SERPL-MCNC: 4.4 G/DL (ref 3.5–5.2)
ALP BLD-CCNC: 101 U/L (ref 35–104)
ALT SERPL-CCNC: 18 U/L (ref 5–33)
ANION GAP SERPL CALCULATED.3IONS-SCNC: 10 MMOL/L (ref 7–19)
AST SERPL-CCNC: 18 U/L (ref 5–32)
BASOPHILS ABSOLUTE: 0 K/UL (ref 0–0.2)
BASOPHILS RELATIVE PERCENT: 0.5 % (ref 0–1)
BILIRUB SERPL-MCNC: 0.3 MG/DL (ref 0.2–1.2)
BUN BLDV-MCNC: 9 MG/DL (ref 6–20)
CALCIUM SERPL-MCNC: 9.8 MG/DL (ref 8.6–10)
CHLORIDE BLD-SCNC: 101 MMOL/L (ref 98–111)
CO2: 29 MMOL/L (ref 22–29)
CREAT SERPL-MCNC: 0.7 MG/DL (ref 0.5–0.9)
EOSINOPHILS ABSOLUTE: 0.1 K/UL (ref 0–0.6)
EOSINOPHILS RELATIVE PERCENT: 2.4 % (ref 0–5)
FERRITIN: 976.3 NG/ML (ref 13–150)
GFR SERPL CREATININE-BSD FRML MDRD: >60 ML/MIN/{1.73_M2}
GLUCOSE BLD-MCNC: 124 MG/DL (ref 74–109)
HCT VFR BLD CALC: 40.6 % (ref 37–47)
HEMOGLOBIN: 13.5 G/DL (ref 12–16)
IMMATURE GRANULOCYTES #: 0 K/UL
LYMPHOCYTES ABSOLUTE: 2 K/UL (ref 1.1–4.5)
LYMPHOCYTES RELATIVE PERCENT: 33.7 % (ref 20–40)
MAGNESIUM: 1.9 MG/DL (ref 1.6–2.6)
MCH RBC QN AUTO: 31 PG (ref 27–31)
MCHC RBC AUTO-ENTMCNC: 33.3 G/DL (ref 33–37)
MCV RBC AUTO: 93.1 FL (ref 81–99)
MONOCYTES ABSOLUTE: 0.4 K/UL (ref 0–0.9)
MONOCYTES RELATIVE PERCENT: 6.1 % (ref 0–10)
NEUTROPHILS ABSOLUTE: 3.4 K/UL (ref 1.5–7.5)
NEUTROPHILS RELATIVE PERCENT: 57 % (ref 50–65)
PDW BLD-RTO: 12.8 % (ref 11.5–14.5)
PLATELET # BLD: 247 K/UL (ref 130–400)
PMV BLD AUTO: 9.8 FL (ref 9.4–12.3)
POTASSIUM SERPL-SCNC: 4.1 MMOL/L (ref 3.5–5)
RBC # BLD: 4.36 M/UL (ref 4.2–5.4)
SODIUM BLD-SCNC: 140 MMOL/L (ref 136–145)
TOTAL PROTEIN: 6.8 G/DL (ref 6.6–8.7)
WBC # BLD: 5.9 K/UL (ref 4.8–10.8)

## 2023-01-04 NOTE — TELEPHONE ENCOUNTER
Assessment:      1. Fatty liver  2. Elevated ferritin  -     Ferritin; Future                    Plan:   Repeat nivia level   No iron supplements   Low iron diet     Orders        Orders Placed This Encounter   Procedures    Ferritin       Standing Status:   Future       Standing Expiration Date:   12/14/2023        Last visit CT aprn 12-14-22, see above. FU 2-14-23. Patient called today from [phone] said her previous Ferritin level on 12-14-22 was 899.4, she did a recheck today and it is even higher at 976.3. Patient said she and CT aprn had discussed getting an MRI completed and patient was wondering if CT aprn would agree to this, patient said she has an enlarged and fatty liver and she is concerned, she is on a low iron diet and no supplements at present as instructed. Patient is asking for a return call once CT aprn has reviewed.  Antonio cuevas

## 2023-01-04 NOTE — TELEPHONE ENCOUNTER
Thanks Michelle, I gave the patient the phone number to the Scheduling Dept, she wants to call herself since she knows her schedule. Patient said thank you.   mason

## 2023-01-12 ENCOUNTER — TELEPHONE (OUTPATIENT)
Dept: HEMATOLOGY | Age: 58
End: 2023-01-12

## 2023-01-12 ENCOUNTER — HOSPITAL ENCOUNTER (OUTPATIENT)
Dept: MRI IMAGING | Age: 58
Discharge: HOME | End: 2023-01-12
Payer: COMMERCIAL

## 2023-01-12 DIAGNOSIS — K76.0 FATTY LIVER: ICD-10-CM

## 2023-01-12 PROCEDURE — 74183 MRI ABD W/O CNTR FLWD CNTR: CPT

## 2023-01-12 PROCEDURE — A9585 GADOBUTROL INJECTION: HCPCS | Performed by: NURSE PRACTITIONER

## 2023-01-12 PROCEDURE — 6360000004 HC RX CONTRAST MEDICATION: Performed by: NURSE PRACTITIONER

## 2023-01-12 RX ADMIN — GADOBUTROL 10 ML: 604.72 INJECTION INTRAVENOUS at 08:37

## 2023-01-12 NOTE — PROGRESS NOTES
OP HEMATOLOGY/ONCOLOGY CONSULTATION      Pt Name: Demetrice Marrufo  YOB: 1965  MRN: [id number]  Referring provider: CHONG Morrissey  Requesting provider: Dr. Hilda Mcdowell  Reason for consultation: Elevated Ferritin   Date of evaluation: 1/13/2023    History Obtained From:  patient, electronic medical record    CHIEF COMPLAINT:    Chief Complaint   Patient presents with    New Patient     History of iron def. Anemia and elev. Ferritin, hepatomegaly     HISTORY OF PRESENT ILLNESS:    Demetrice Marrufo is a 57 y.o.  female referred to the clinic by Dr. Hilda Mcdowell for evaluation of elevated ferritin.  Hematology/Oncology consultation is performed 1/13/2023.    PMH significant for iron deficiency, hypertension, hyperlipidemia, obesity, allergies, arthritis, GERD, hypothyroidism, depression.    Demetrice states she was noted to have elevated ferritin level on routine serology.      Transferrin saturation: 32.55%    Labs 11/30/2022:  Sed Rate: 75  Hepatitis B: Negative  Copper: 140.3  KARL: not detected     Demetrice was referred to gastroenterology, evaluated by CHONG Costello 12/14/2022 who requested the following abdominal ultrasound:    [facility] Abd [facility]:  Liver: Right hepatic lobe length: 19.1 cm Parenchyma/Contour: Smooth hepatic contours.Diffuse increased echogenicity and beam attenuation. Focal lesion: None detected. Main portal vein: Patent with normal (hepatopetal) flow.Biliary ducts: No intrahepatic or extrahepatic biliary dilation.Common duct measures 0.4 cm in diameter at the nu hepatis.  Spleen:Size: Normal in size, 8.7 cm in length    MRI abdomen was completed on 1/12/2023.  Results pending.    Findings were discussed with Demetrice and her , Graeme.  Iron studies were discussed including transferrin saturation of 32.55%, arguing against iron overload.  Elevated ferritin is felt reactive in nature, likely associated with metabolic syndrome, arthritis, etc.  HEMATOLOGY HISTORY: History of Iron Deficiency Anemia, 2018  Elevated Ferritin, 2023  Inessa Rodriguez was seen in hematology consultation by Dr. Emily Lozano as an inpatient at [facility] 6/2018. She was admitted for chest pain and shortness of breath. Prior cardiac work-up was negative. Deborah Leonard was found to have iron deficiency. Serology revealed a ferritin of 6.6, iron saturation 8%, TIBC 298. B12 was 345. EGD and colonoscopy by Dr. Portia Singh 6/11/2018 were negative. She was given parenteral iron replacement. Deborah Leonard was referred back to the clinic 1/13/2022 for elevated ferritin.      Past Medical History:   Diagnosis Date    Anxiety     Bulging lumbar disc     L3-L4    Depression     GERD (gastroesophageal reflux disease)     HTN (hypertension)     Hyperlipidemia     Hypothyroid     Iron deficiency anemia      Past Surgical History:   Procedure Laterality Date    BREAST BIOPSY Right 2010    benign    CHOLECYSTECTOMY      HYSTERECTOMY (CERVIX STATUS UNKNOWN)  2008    OVARY REMOVAL Bilateral 2008    age 37    [facility] COLONOSCOPY FLX DX W/COLLJ SPEC WHEN PFRMD N/A 6/11/2018    Dr Last Lim, 5 yr recall    [facility] ESOPHAGOGASTRODUODENOSCOPY TRANSORAL DIAGNOSTIC N/A 6/11/2018    Dr Berna Pickett (-)       Current Outpatient Medications:     pramipexole (MIRAPEX) 1 MG tablet, Take 1 mg by mouth 3 times daily, Disp: , Rfl:     fluticasone (FLONASE) 50 MCG/ACT nasal spray, 1 spray by Each Nostril route daily, Disp: 32 g, Rfl: 1    irbesartan-hydroCHLOROthiazide (AVALIDE) 150-12.5 MG per tablet, Take 1 tablet by mouth daily , Disp: , Rfl:     Melatonin 10 MG TABS, Take by mouth nightly , Disp: , Rfl:     cetirizine (ZYRTEC) 10 MG tablet, Take 10 mg by mouth daily, Disp: , Rfl:     omeprazole (PRILOSEC) 40 MG delayed release capsule, Take 20 mg by mouth daily , Disp: , Rfl:     QUEtiapine (SEROQUEL) 300 MG tablet, Take 600 mg by mouth nightly, Disp: , Rfl:     levothyroxine (SYNTHROID) 50 MCG tablet, Take 50 mcg by mouth Daily, Disp: , Rfl:     ALPRAZolam (XANAX) 0.5 MG tablet, Take 0.5 mg by mouth nightly as needed for Sleep, Disp: , Rfl:    Allergies: No Known Allergies  Social History     Tobacco Use    Smoking status: Never    Smokeless tobacco: Never   Vaping Use    Vaping Use: Never used   Substance Use Topics    Alcohol use: No     Alcohol/week: 0.0 standard drinks    Drug use: No     Family History   Problem Relation Age of Onset    Diabetes Mother     Heart Disease Mother     Colon Polyps Father     Stroke Father     No Known Problems Brother     Diabetes Maternal Aunt     Stomach Cancer Maternal Aunt     Heart Disease Maternal Uncle     Colon Cancer Neg Hx     Esophageal Cancer Neg Hx     Liver Cancer Neg Hx     Liver Disease Neg Hx     Rectal Cancer Neg Hx      Health Maintenance   Topic Date Due    Depression Monitoring  Never done    HIV screen  Never done    Hepatitis C screen  Never done    DTaP/Tdap/Td vaccine (1 - Tdap) Never done    Shingles vaccine (1 of 2) Never done    COVID-19 Vaccine (5 - Booster for Pfizer series) 07/08/2022    Colorectal Cancer Screen  06/11/2023    A1C test (Diabetic or Prediabetic)  06/29/2023    Breast cancer screen  11/23/2023    Lipids  10/11/2027    Flu vaccine  Completed    Hepatitis A vaccine  Aged Out    Hib vaccine  Aged Out    Meningococcal (ACWY) vaccine  Aged Out    Pneumococcal 0-64 years Vaccine  Aged Out     Subjective   Review of Systems   Constitutional:  Positive for fatigue and unexpected weight change (gain). Negative for fever. HENT:  Negative for dental problem, hearing loss, mouth sores, nosebleeds, sore throat and trouble swallowing. Eyes:  Negative for discharge and itching. Respiratory:  Positive for shortness of breath. Negative for cough and wheezing. Cardiovascular:  Positive for leg swelling. Negative for chest pain and palpitations. Gastrointestinal:  Negative for abdominal pain, constipation, diarrhea, nausea and vomiting.    Endocrine: Negative for cold intolerance and heat intolerance. Genitourinary:  Negative for dysuria, frequency, hematuria and urgency. Musculoskeletal:  Positive for arthralgias and back pain. Negative for joint swelling and myalgias. Skin:  Negative for pallor and rash. Allergic/Immunologic: Negative for environmental allergies and immunocompromised state. Neurological:  Negative for seizures, syncope and numbness. Hematological:  Negative for adenopathy. Bruises/bleeds easily. Psychiatric/Behavioral:  Negative for agitation, behavioral problems and confusion. Anxiety, depression   Objective   Physical Exam  Vitals reviewed. Constitutional:       General: She is not in acute distress. Appearance: She is well-developed. She is obese. She is not toxic-appearing or diaphoretic. HENT:      Head: Normocephalic and atraumatic. Right Ear: External ear normal.      Left Ear: External ear normal.      Nose: Nose normal.      Mouth/Throat:      Mouth: Mucous membranes are moist.   Eyes:      General: No scleral icterus. Right eye: No discharge. Left eye: No discharge. Conjunctiva/sclera: Conjunctivae normal.   Neck:      Trachea: No tracheal deviation. Cardiovascular:      Rate and Rhythm: Normal rate and regular rhythm. Pulmonary:      Effort: Pulmonary effort is normal. No respiratory distress. Breath sounds: Normal breath sounds. No wheezing or rales. Abdominal:      General: Bowel sounds are normal. There is no distension. Palpations: Abdomen is soft. Tenderness: There is no abdominal tenderness. There is no guarding. Genitourinary:     Comments: Exam deferred  Musculoskeletal:         General: No tenderness or deformity. Cervical back: Neck supple. No muscular tenderness. Comments: Normal ROM all four extremities   Lymphadenopathy:      Cervical:      Right cervical: No superficial or deep cervical adenopathy.      Left cervical: No superficial or deep cervical adenopathy. Upper Body:      Right upper body: No supraclavicular adenopathy. Left upper body: No supraclavicular adenopathy. Comments:      Skin:     General: Skin is warm and dry. Findings: No rash. Neurological:      Mental Status: She is alert and oriented to person, place, and time. Comments: follows commands, non-focal   Psychiatric:         Behavior: Behavior normal. Behavior is cooperative. Thought Content: Thought content normal.         Judgment: Judgment normal.      Comments: Alert and oriented to person, place and time. /76   Pulse 97   Temp 98.7 °F (37.1 °C) (Oral)   Ht 5' 3\" (1.6 m)   Wt 226 lb 11.2 oz (102.8 kg)   SpO2 96%   BMI 40.16 kg/m²   Wt Readings from Last 3 Encounters:   01/13/23 226 lb 11.2 oz (102.8 kg)   12/14/22 225 lb (102.1 kg)   05/26/22 239 lb (108.4 kg)     Labs reviewed by me:  CBC:  Lab Results   Component Value Date    WBC 4.94 01/13/2023    HGB 12.7 01/13/2023    HCT 38.1 01/13/2023    MCV 91.1 01/13/2023     (L) 01/13/2023    LABLYMP 1.41 06/25/2015    LYMPHOPCT 31.4 01/13/2023    RBC 4.18 01/13/2023    MCH 30.4 01/13/2023    MCHC 33.3 01/13/2023    RDW 12.9 01/13/2023     ASSESSMENT/PLAN:    1. Elevated ferritin level    2. History of iron deficiency    3. Morbid obesity with BMI of 40.0-44.9, adult [facility])      Labs 11/28/2022:  Iron: 69, TIBC 212        Transferrin saturation 33.55%, arguing against iron overload  Sed rate elevated as well  Elevated ferritin is felt reactive in nature, likely associated with metabolic syndrome, arthritis etc.    Recommend weight loss, address arthritis etc.  Body mass index is 40.16 kg/m². Federal guidelines recommend that people under the age of 72 should have a BMI of 18.5-25 and people age 72 and older should have a BMI of 23-30. If yours is outside the range, we recommend you utilize a diet and exercise program to get yours into the range.   We also recommend you speak with your primary care doctor should any specific advice be needed regarding special diets or programs which would be appropriate for your circumstances. Continue to follow with GI regarding fatty liver    MRI abdomen completed 1/12/2023. Follow-up results. Care plan discussed with patient and spouse      Return in about 6 months (around 7/13/2023) for follow up with CHONG Kendrick. I have seen, examined and reviewed this patient medication list, appropriate labs and imaging studies. I reviewed relevant medical records and others physicians notes. I discussed the plan of care with the patient. I answered all questions to the patients satisfaction. I have also reviewed the chief complaint (CC) and part of the history (History of Present Illness (HPI), Past Family Social History [facility]), or Review of Systems (ROS) and made changes when appropriate. Office note and serology completed by Dr. Ama Crespo reviewed. Dictated utilizing Dragon transcription software.         CHONG Do  9:46 PM  1/14/2023

## 2023-01-13 ENCOUNTER — HOSPITAL ENCOUNTER (OUTPATIENT)
Dept: INFUSION THERAPY | Age: 58
Discharge: HOME | End: 2023-01-13
Payer: COMMERCIAL

## 2023-01-13 ENCOUNTER — OFFICE VISIT (OUTPATIENT)
Dept: HEMATOLOGY | Age: 58
End: 2023-01-13
Payer: COMMERCIAL

## 2023-01-13 VITALS
WEIGHT: 226.7 LBS | HEIGHT: 63 IN | BODY MASS INDEX: 40.17 KG/M2 | OXYGEN SATURATION: 96 % | DIASTOLIC BLOOD PRESSURE: 76 MMHG | SYSTOLIC BLOOD PRESSURE: 112 MMHG | TEMPERATURE: 98.7 F | HEART RATE: 97 BPM

## 2023-01-13 DIAGNOSIS — Z86.39 HISTORY OF IRON DEFICIENCY: ICD-10-CM

## 2023-01-13 DIAGNOSIS — R79.89 ELEVATED FERRITIN: ICD-10-CM

## 2023-01-13 DIAGNOSIS — R79.89 ELEVATED FERRITIN: Primary | ICD-10-CM

## 2023-01-13 DIAGNOSIS — E66.01 MORBID OBESITY WITH BMI OF 40.0-44.9, ADULT (HCC): ICD-10-CM

## 2023-01-13 DIAGNOSIS — R79.89 ELEVATED FERRITIN LEVEL: Primary | ICD-10-CM

## 2023-01-13 LAB
BASOPHILS ABSOLUTE: 0.03 K/UL (ref 0.01–0.08)
BASOPHILS RELATIVE PERCENT: 0.6 % (ref 0.1–1.2)
EOSINOPHILS ABSOLUTE: 0.14 K/UL (ref 0.04–0.54)
EOSINOPHILS RELATIVE PERCENT: 2.8 % (ref 0.7–7)
HCT VFR BLD CALC: 38.1 % (ref 34.1–44.9)
HEMOGLOBIN: 12.7 G/DL (ref 11.2–15.7)
LYMPHOCYTES ABSOLUTE: 1.55 K/UL (ref 1.18–3.74)
LYMPHOCYTES RELATIVE PERCENT: 31.4 % (ref 19.3–53.1)
MCH RBC QN AUTO: 30.4 PG (ref 25.6–32.2)
MCHC RBC AUTO-ENTMCNC: 33.3 G/DL (ref 32.3–35.5)
MCV RBC AUTO: 91.1 FL (ref 79.4–94.8)
MONOCYTES ABSOLUTE: 0.36 K/UL (ref 0.24–0.82)
MONOCYTES RELATIVE PERCENT: 7.3 % (ref 4.7–12.5)
NEUTROPHILS ABSOLUTE: 2.83 K/UL (ref 1.56–6.13)
NEUTROPHILS RELATIVE PERCENT: 57.3 % (ref 34–71.1)
PDW BLD-RTO: 12.9 % (ref 11.7–14.4)
PLATELET # BLD: 179 K/UL (ref 182–369)
PMV BLD AUTO: 10.2 FL (ref 7.4–10.4)
RBC # BLD: 4.18 M/UL (ref 3.93–5.22)
WBC # BLD: 4.94 K/UL (ref 3.98–10.04)

## 2023-01-13 PROCEDURE — 36415 COLL VENOUS BLD VENIPUNCTURE: CPT

## 2023-01-13 PROCEDURE — 99203 OFFICE O/P NEW LOW 30 MIN: CPT | Performed by: NURSE PRACTITIONER

## 2023-01-13 PROCEDURE — 85025 COMPLETE CBC W/AUTO DIFF WBC: CPT

## 2023-01-13 PROCEDURE — 99212 OFFICE O/P EST SF 10 MIN: CPT

## 2023-01-13 PROCEDURE — 3078F DIAST BP <80 MM HG: CPT | Performed by: NURSE PRACTITIONER

## 2023-01-13 PROCEDURE — 3074F SYST BP LT 130 MM HG: CPT | Performed by: NURSE PRACTITIONER

## 2023-01-13 ASSESSMENT — PROMIS GLOBAL HEALTH SCALE
IN GENERAL, HOW WOULD YOU RATE YOUR PHYSICAL HEALTH [ON A SCALE OF 1 (POOR) TO 5 (EXCELLENT)]?: 2
SUM OF RESPONSES TO QUESTIONS 2, 4, 5, & 10: 14
IN GENERAL, PLEASE RATE HOW WELL YOU CARRY OUT YOUR USUAL SOCIAL ACTIVITIES (INCLUDES ACTIVITIES AT HOME, AT WORK, AND IN YOUR COMMUNITY, AND RESPONSIBILITIES AS A PARENT, CHILD, SPOUSE, EMPLOYEE, FRIEND, ETC) [ON A SCALE OF 1 (POOR) TO 5 (EXCELLENT)]?: 3
IN GENERAL, HOW WOULD YOU RATE YOUR SATISFACTION WITH YOUR SOCIAL ACTIVITIES AND RELATIONSHIPS [ON A SCALE OF 1 (POOR) TO 5 (EXCELLENT)]?: 3
SUM OF RESPONSES TO QUESTIONS 3, 6, 7, & 8: 7
IN GENERAL, HOW WOULD YOU RATE YOUR MENTAL HEALTH, INCLUDING YOUR MOOD AND YOUR ABILITY TO THINK [ON A SCALE OF 1 (POOR) TO 5 (EXCELLENT)]?: 3
IN GENERAL, WOULD YOU SAY YOUR HEALTH IS...[ON A SCALE OF 1 (POOR) TO 5 (EXCELLENT)]: 3
IN THE PAST 7 DAYS, HOW WOULD YOU RATE YOUR PAIN ON AVERAGE [ON A SCALE FROM 0 (NO PAIN) TO 10 (WORST IMAGINABLE PAIN)]?: 0
IN GENERAL, WOULD YOU SAY YOUR QUALITY OF LIFE IS...[ON A SCALE OF 1 (POOR) TO 5 (EXCELLENT)]: 3
TO WHAT EXTENT ARE YOU ABLE TO CARRY OUT YOUR EVERYDAY PHYSICAL ACTIVITIES SUCH AS WALKING, CLIMBING STAIRS, CARRYING GROCERIES, OR MOVING A CHAIR [ON A SCALE OF 1 (NOT AT ALL) TO 5 (COMPLETELY)]?: 2
IN THE PAST 7 DAYS, HOW WOULD YOU RATE YOUR FATIGUE ON AVERAGE [ON A SCALE FROM 1 (NONE) TO 5 (VERY SEVERE)]?: 3
IN THE PAST 7 DAYS, HOW OFTEN HAVE YOU BEEN BOTHERED BY EMOTIONAL PROBLEMS, SUCH AS FEELING ANXIOUS, DEPRESSED, OR IRRITABLE [ON A SCALE FROM 1 (NEVER) TO 5 (ALWAYS)]?: 5

## 2023-01-14 ASSESSMENT — ENCOUNTER SYMPTOMS
EYE ITCHING: 0
ABDOMINAL PAIN: 0
SHORTNESS OF BREATH: 1
NAUSEA: 0
CONSTIPATION: 0
SORE THROAT: 0
VOMITING: 0
DIARRHEA: 0
COUGH: 0
BACK PAIN: 1
WHEEZING: 0
TROUBLE SWALLOWING: 0
EYE DISCHARGE: 0

## 2023-01-16 ENCOUNTER — TELEPHONE (OUTPATIENT)
Dept: GASTROENTEROLOGY | Age: 58
End: 2023-01-16

## 2023-01-16 NOTE — TELEPHONE ENCOUNTER
Left vm for pt to call back.     ----- Message from CHONG Coker sent at 1/14/2023 10:58 PM CST -----  Will you please let Appia know that her MRI just showed fatty liver as previously noted on her ultrasound

## 2023-01-16 NOTE — TELEPHONE ENCOUNTER
1-16-23 @ 11:05 am    I will let JORGE cuevas know this patient has called back and I gave her the information from [address] [place] [address] regarding her results.  Antonio cuevas

## 2023-04-18 LAB
CHOLEST SERPL-MCNC: 155 MG/DL (ref 160–199)
GLUCOSE SERPL-MCNC: 96 MG/DL (ref 74–109)
HDLC SERPL-MCNC: 54 MG/DL (ref 65–121)
LDLC SERPL CALC-MCNC: 81 MG/DL
TRIGL SERPL-MCNC: 102 MG/DL (ref 0–149)

## 2023-07-27 ENCOUNTER — OFFICE VISIT (OUTPATIENT)
Age: 58
End: 2023-07-27
Payer: COMMERCIAL

## 2023-07-27 VITALS
HEART RATE: 67 BPM | RESPIRATION RATE: 18 BRPM | SYSTOLIC BLOOD PRESSURE: 116 MMHG | BODY MASS INDEX: 38.62 KG/M2 | DIASTOLIC BLOOD PRESSURE: 80 MMHG | WEIGHT: 218 LBS | TEMPERATURE: 97.3 F | OXYGEN SATURATION: 97 %

## 2023-07-27 DIAGNOSIS — J01.90 ACUTE RHINOSINUSITIS: Primary | ICD-10-CM

## 2023-07-27 DIAGNOSIS — H66.93 ACUTE BILATERAL OTITIS MEDIA: ICD-10-CM

## 2023-07-27 PROCEDURE — 3079F DIAST BP 80-89 MM HG: CPT

## 2023-07-27 PROCEDURE — 3074F SYST BP LT 130 MM HG: CPT

## 2023-07-27 PROCEDURE — 99213 OFFICE O/P EST LOW 20 MIN: CPT

## 2023-07-27 RX ORDER — CEFDINIR 300 MG/1
300 CAPSULE ORAL 2 TIMES DAILY
Qty: 14 CAPSULE | Refills: 0 | Status: SHIPPED | OUTPATIENT
Start: 2023-07-27 | End: 2023-08-03

## 2023-07-27 RX ORDER — METHYLPREDNISOLONE 4 MG/1
TABLET ORAL
Qty: 1 KIT | Refills: 0 | Status: SHIPPED | OUTPATIENT
Start: 2023-07-27

## 2023-07-27 RX ORDER — FLUCONAZOLE 150 MG/1
150 TABLET ORAL DAILY
Qty: 3 TABLET | Refills: 0 | Status: SHIPPED | OUTPATIENT
Start: 2023-07-27 | End: 2023-08-05

## 2023-07-27 ASSESSMENT — ENCOUNTER SYMPTOMS
NAUSEA: 0
EYE DISCHARGE: 0
APNEA: 0
WHEEZING: 0
DIARRHEA: 0
SINUS PRESSURE: 1
TROUBLE SWALLOWING: 0
FACIAL SWELLING: 0
COLOR CHANGE: 0
SINUS PAIN: 0
SHORTNESS OF BREATH: 0
EYE REDNESS: 0
RHINORRHEA: 0
COUGH: 0
SORE THROAT: 0
ABDOMINAL PAIN: 0
EYE PAIN: 0
VOMITING: 0

## 2023-07-27 NOTE — PATIENT INSTRUCTIONS
Cefdinir prescribed; Take full course of antibiotics. Medrol pack prescribed. Diflucan prescribed; start taking with antibiotic. Increase fluid intake    Recommended OTC flonase    Recommended OTC claritin or zyrtec    The patient is to follow up with PCP or return to clinic if symptoms worsen/fail to improve.

## 2023-07-27 NOTE — PROGRESS NOTES
[address] [place], [place] [address]  [address] [address]  Dept: [phone]  Dept Fax: [phone]  Loc: [phone]    Marcie Corey is a 59 y.o. female who presents today for her medical conditions/complaints as noted below. Marcie Corey is complaining of Otalgia (Left ear and side of face) and Congestion        HPI:   Pt presents with c/o sinus congestion x several days and left sided earache that started yesterday. Denies fever, body aches, chills, drainage from ears. Pt is taking zyrtec with some relief. No aggravating factors reported. S/s moderate. stable    Otalgia   Pertinent negatives include no abdominal pain, coughing, diarrhea, ear discharge, headaches, hearing loss, rash, rhinorrhea, sore throat or vomiting. Past Medical History:   Diagnosis Date    Anxiety     Bulging lumbar disc     L3-L4    Depression     GERD (gastroesophageal reflux disease)     HTN (hypertension)     Hyperlipidemia     Hypothyroid     Iron deficiency anemia        Past Surgical History:   Procedure Laterality Date    BREAST BIOPSY Right 2010    benign    CHOLECYSTECTOMY      HYSTERECTOMY (CERVIX STATUS UNKNOWN)  2008    OVARY REMOVAL Bilateral 2008    age 37    [facility] COLONOSCOPY FLX DX W/COLLJ SPEC WHEN PFRMD N/A 6/11/2018    Dr Cally Neal, 5 yr recall    [facility] ESOPHAGOGASTRODUODENOSCOPY TRANSORAL DIAGNOSTIC N/A 6/11/2018    Dr De Kilpatrick (-)       Family History   Problem Relation Age of Onset    Diabetes Mother     Heart Disease Mother     Colon Polyps Father     Stroke Father     No Known Problems Brother     Diabetes Maternal Aunt     Stomach Cancer Maternal Aunt     Heart Disease Maternal Uncle     Colon Cancer Neg Hx     Esophageal Cancer Neg Hx     Liver Cancer Neg Hx     Liver Disease Neg Hx     Rectal Cancer Neg Hx        Social History     Tobacco Use    Smoking status: Never    Smokeless tobacco: Never   Substance Use Topics    Alcohol use:  No

## 2023-08-17 ENCOUNTER — TELEPHONE (OUTPATIENT)
Dept: HEMATOLOGY | Age: 58
End: 2023-08-17

## 2023-08-17 DIAGNOSIS — R79.89 ELEVATED FERRITIN LEVEL: Primary | ICD-10-CM

## 2023-08-17 DIAGNOSIS — Z86.39 HISTORY OF IRON DEFICIENCY: ICD-10-CM

## 2023-08-17 NOTE — PROGRESS NOTES
OP HEMATOLOGY/ONCOLOGY PROGRESS NOTE      Pt Name: Suzette Maurer  YOB: 1965  MRN: [id number]  PCP: Dr. Ashley Gilbert  Date of evaluation: 8/18/2023    History Obtained From:  patient, electronic medical record    CHIEF COMPLAINT:    Chief Complaint   Patient presents with    Follow-up     Elevated ferritin level      HISTORY OF PRESENT ILLNESS:    Suzette Maurer is a 59 y.o.  female returning to the clinic for to discuss serology completed for elevated ferritin. Fawad Maravilla reports a sinus and ear infection 3-4 weeks ago. She was treated with penicillin and subsequently developed a rash. Penicillin will now be listed as an allergy, per patient request.   Since last evaluated, she was also evaluated by rheumatologist, Dr. Neel Jimenes tells me it was felt she had bursitis in her hips. HEMATOLOGY HISTORY: Elevated ferritin  Suzette Maurer was seen in hematology consultation 1/13/2023, referred by Dr. Ashley Gilbert for evaluation of elevated ferritin. PMH significant for iron deficiency, hypertension, hyperlipidemia, obesity, allergies, arthritis, GERD, hypothyroidism, depression. Fawad Maravilla states she was noted to have elevated ferritin level on routine serology. Transferrin saturation: 32.55%    Labs 11/30/2022:  Sed Rate: 75  Hepatitis B: Negative  Copper: 140.3  KARL: not detected     Fawad Maravilla was referred to gastroenterology, evaluated by CHONG Jimenez 12/14/2022 who requested the following abdominal ultrasound:    US Abd 12/8/2022 at [facility]:  Liver: Right hepatic lobe length: 19.1 cm Parenchyma/Contour: Smooth hepatic contours. Diffuse increased echogenicity and beam attenuation. Focal lesion: None detected. Main portal vein: Patent with normal (hepatopetal) flow. Biliary ducts: No intrahepatic or extrahepatic biliary dilation. Common duct measures 0.4 cm in diameter at the nu hepatis.   Spleen:Size: Normal in size, 8.7 cm in length    MRI of abd W/Wo contrast 1/12/2023

## 2023-08-18 ENCOUNTER — OFFICE VISIT (OUTPATIENT)
Dept: HEMATOLOGY | Age: 58
End: 2023-08-18
Payer: COMMERCIAL

## 2023-08-18 ENCOUNTER — HOSPITAL ENCOUNTER (OUTPATIENT)
Dept: INFUSION THERAPY | Age: 58
Discharge: HOME | End: 2023-08-18
Payer: COMMERCIAL

## 2023-08-18 VITALS
WEIGHT: 220.5 LBS | SYSTOLIC BLOOD PRESSURE: 132 MMHG | OXYGEN SATURATION: 97 % | HEART RATE: 94 BPM | DIASTOLIC BLOOD PRESSURE: 80 MMHG | BODY MASS INDEX: 39.06 KG/M2

## 2023-08-18 DIAGNOSIS — R79.89 ELEVATED FERRITIN LEVEL: Primary | ICD-10-CM

## 2023-08-18 DIAGNOSIS — K76.0 FATTY LIVER: ICD-10-CM

## 2023-08-18 DIAGNOSIS — R79.89 ELEVATED FERRITIN LEVEL: ICD-10-CM

## 2023-08-18 DIAGNOSIS — Z71.89 CARE PLAN DISCUSSED WITH PATIENT: ICD-10-CM

## 2023-08-18 DIAGNOSIS — E66.9 OBESITY (BMI 30-39.9): ICD-10-CM

## 2023-08-18 DIAGNOSIS — Z86.39 HISTORY OF IRON DEFICIENCY: ICD-10-CM

## 2023-08-18 LAB
25(OH)D3 SERPL-MCNC: 24.2 NG/ML
ALBUMIN SERPL-MCNC: 4 G/DL (ref 3.5–5.2)
ALP SERPL-CCNC: 83 U/L (ref 35–104)
ALT SERPL-CCNC: 14 U/L (ref 5–33)
ANION GAP SERPL CALCULATED.3IONS-SCNC: 9 MMOL/L (ref 7–19)
AST SERPL-CCNC: 13 U/L (ref 5–32)
BASOPHILS # BLD: 0 K/UL (ref 0–0.2)
BASOPHILS NFR BLD: 0.6 % (ref 0–1)
BILIRUB SERPL-MCNC: <0.2 MG/DL (ref 0.2–1.2)
BUN SERPL-MCNC: 11 MG/DL (ref 8–23)
CALCIUM SERPL-MCNC: 9.3 MG/DL (ref 8.8–10.2)
CHLORIDE SERPL-SCNC: 103 MMOL/L (ref 98–111)
CHOLEST SERPL-MCNC: 175 MG/DL (ref 160–199)
CO2 SERPL-SCNC: 30 MMOL/L (ref 22–29)
CREAT SERPL-MCNC: 0.8 MG/DL (ref 0.5–0.9)
EOSINOPHIL # BLD: 0.2 K/UL (ref 0–0.6)
EOSINOPHIL NFR BLD: 3.5 % (ref 0–5)
ERYTHROCYTE [DISTWIDTH] IN BLOOD BY AUTOMATED COUNT: 12.7 % (ref 11.5–14.5)
FERRITIN SERPL-MCNC: 759.1 NG/ML (ref 13–150)
GLUCOSE SERPL-MCNC: 120 MG/DL (ref 74–109)
HBA1C MFR BLD: 5.7 % (ref 4–6)
HCT VFR BLD AUTO: 37.2 % (ref 37–47)
HDLC SERPL-MCNC: 57 MG/DL (ref 65–121)
HGB BLD-MCNC: 12.3 G/DL (ref 12–16)
IMM GRANULOCYTES # BLD: 0 K/UL
IRON SERPL-MCNC: 82 UG/DL (ref 37–145)
LDLC SERPL CALC-MCNC: 97 MG/DL
LYMPHOCYTES # BLD: 2 K/UL (ref 1.1–4.5)
LYMPHOCYTES NFR BLD: 38 % (ref 20–40)
MCH RBC QN AUTO: 31 PG (ref 27–31)
MCHC RBC AUTO-ENTMCNC: 33.1 G/DL (ref 33–37)
MCV RBC AUTO: 93.7 FL (ref 81–99)
MONOCYTES # BLD: 0.4 K/UL (ref 0–0.9)
MONOCYTES NFR BLD: 7.9 % (ref 0–10)
NEUTROPHILS # BLD: 2.6 K/UL (ref 1.5–7.5)
NEUTS SEG NFR BLD: 49.6 % (ref 50–65)
PLATELET # BLD AUTO: 201 K/UL (ref 130–400)
PMV BLD AUTO: 10 FL (ref 9.4–12.3)
POTASSIUM SERPL-SCNC: 3.8 MMOL/L (ref 3.5–5)
PROT SERPL-MCNC: 6.9 G/DL (ref 6.6–8.7)
RBC # BLD AUTO: 3.97 M/UL (ref 4.2–5.4)
SODIUM SERPL-SCNC: 142 MMOL/L (ref 136–145)
T4 FREE SERPL-MCNC: 0.84 NG/DL (ref 0.93–1.7)
TIBC SERPL-MCNC: 240 UG/DL (ref 250–400)
TRIGL SERPL-MCNC: 107 MG/DL (ref 0–149)
TSH SERPL DL<=0.005 MIU/L-ACNC: 1.4 UIU/ML (ref 0.27–4.2)
WBC # BLD AUTO: 5.2 K/UL (ref 4.8–10.8)

## 2023-08-18 PROCEDURE — 3078F DIAST BP <80 MM HG: CPT | Performed by: NURSE PRACTITIONER

## 2023-08-18 PROCEDURE — 3074F SYST BP LT 130 MM HG: CPT | Performed by: NURSE PRACTITIONER

## 2023-08-18 PROCEDURE — 99212 OFFICE O/P EST SF 10 MIN: CPT

## 2023-08-18 PROCEDURE — 99213 OFFICE O/P EST LOW 20 MIN: CPT | Performed by: NURSE PRACTITIONER

## 2023-08-21 ASSESSMENT — ENCOUNTER SYMPTOMS
DIARRHEA: 0
EYE ITCHING: 0
NAUSEA: 0
EYE DISCHARGE: 0
VOMITING: 0
COUGH: 0
SORE THROAT: 0
ABDOMINAL PAIN: 0
CONSTIPATION: 0
WHEEZING: 0
BACK PAIN: 1
TROUBLE SWALLOWING: 0
SHORTNESS OF BREATH: 1

## 2023-11-09 DIAGNOSIS — R79.89 ELEVATED FERRITIN LEVEL: ICD-10-CM

## 2023-11-09 LAB
ALBUMIN SERPL-MCNC: 4.3 G/DL (ref 3.5–5.2)
ALP SERPL-CCNC: 88 U/L (ref 35–104)
ALT SERPL-CCNC: 11 U/L (ref 5–33)
ANION GAP SERPL CALCULATED.3IONS-SCNC: 13 MMOL/L (ref 7–19)
AST SERPL-CCNC: 16 U/L (ref 5–32)
BASOPHILS # BLD: 0 K/UL (ref 0–0.2)
BASOPHILS NFR BLD: 0.5 % (ref 0–1)
BILIRUB SERPL-MCNC: 0.5 MG/DL (ref 0.2–1.2)
BUN SERPL-MCNC: 11 MG/DL (ref 8–23)
CALCIUM SERPL-MCNC: 9.4 MG/DL (ref 8.8–10.2)
CHLORIDE SERPL-SCNC: 95 MMOL/L (ref 98–111)
CO2 SERPL-SCNC: 30 MMOL/L (ref 22–29)
CREAT SERPL-MCNC: 0.8 MG/DL (ref 0.5–0.9)
EOSINOPHIL # BLD: 0.1 K/UL (ref 0–0.6)
EOSINOPHIL NFR BLD: 2.5 % (ref 0–5)
ERYTHROCYTE [DISTWIDTH] IN BLOOD BY AUTOMATED COUNT: 12 % (ref 11.5–14.5)
FERRITIN SERPL-MCNC: 930.2 NG/ML (ref 13–150)
GLUCOSE SERPL-MCNC: 106 MG/DL (ref 74–109)
HCT VFR BLD AUTO: 40.6 % (ref 37–47)
HGB BLD-MCNC: 13.7 G/DL (ref 12–16)
IMM GRANULOCYTES # BLD: 0 K/UL
IRON SATN MFR SERPL: 33 % (ref 14–50)
IRON SERPL-MCNC: 85 UG/DL (ref 37–145)
LYMPHOCYTES # BLD: 1.7 K/UL (ref 1.1–4.5)
LYMPHOCYTES NFR BLD: 30.3 % (ref 20–40)
MCH RBC QN AUTO: 30.9 PG (ref 27–31)
MCHC RBC AUTO-ENTMCNC: 33.7 G/DL (ref 33–37)
MCV RBC AUTO: 91.4 FL (ref 81–99)
MONOCYTES # BLD: 0.4 K/UL (ref 0–0.9)
MONOCYTES NFR BLD: 7.6 % (ref 0–10)
NEUTROPHILS # BLD: 3.3 K/UL (ref 1.5–7.5)
NEUTS SEG NFR BLD: 58.7 % (ref 50–65)
PLATELET # BLD AUTO: 205 K/UL (ref 130–400)
PMV BLD AUTO: 10.6 FL (ref 9.4–12.3)
POTASSIUM SERPL-SCNC: 3.3 MMOL/L (ref 3.5–5)
PROT SERPL-MCNC: 7.2 G/DL (ref 6.6–8.7)
RBC # BLD AUTO: 4.44 M/UL (ref 4.2–5.4)
SODIUM SERPL-SCNC: 138 MMOL/L (ref 136–145)
TIBC SERPL-MCNC: 255 UG/DL (ref 250–400)
WBC # BLD AUTO: 5.7 K/UL (ref 4.8–10.8)

## 2023-11-15 ENCOUNTER — HOSPITAL ENCOUNTER (OUTPATIENT)
Dept: INFUSION THERAPY | Age: 58
Discharge: HOME | End: 2023-11-15
Payer: COMMERCIAL

## 2023-11-15 DIAGNOSIS — R79.89 ELEVATED FERRITIN LEVEL: ICD-10-CM

## 2023-11-15 DIAGNOSIS — R79.89 ELEVATED FERRITIN: Primary | ICD-10-CM

## 2023-11-15 DIAGNOSIS — R79.89 ELEVATED FERRITIN: ICD-10-CM

## 2023-11-15 LAB
BASOPHILS # BLD: 0.05 K/UL (ref 0.01–0.08)
BASOPHILS NFR BLD: 0.7 % (ref 0.1–1.2)
EOSINOPHIL # BLD: 0.19 K/UL (ref 0.04–0.54)
EOSINOPHIL NFR BLD: 2.6 % (ref 0.7–7)
ERYTHROCYTE [DISTWIDTH] IN BLOOD BY AUTOMATED COUNT: 12.1 % (ref 11.7–14.4)
HCT VFR BLD AUTO: 40.4 % (ref 34.1–44.9)
HGB BLD-MCNC: 13.9 G/DL (ref 11.2–15.7)
LYMPHOCYTES # BLD: 2.54 K/UL (ref 1.18–3.74)
LYMPHOCYTES NFR BLD: 34.9 % (ref 19.3–53.1)
MCH RBC QN AUTO: 30.8 PG (ref 25.6–32.2)
MCHC RBC AUTO-ENTMCNC: 34.4 G/DL (ref 32.3–35.5)
MCV RBC AUTO: 89.4 FL (ref 79.4–94.8)
MONOCYTES # BLD: 0.48 K/UL (ref 0.24–0.82)
MONOCYTES NFR BLD: 6.6 % (ref 4.7–12.5)
NEUTROPHILS # BLD: 4 K/UL (ref 1.56–6.13)
NEUTS SEG NFR BLD: 55.1 % (ref 34–71.1)
PLATELET # BLD AUTO: 212 K/UL (ref 182–369)
PMV BLD AUTO: 10.1 FL (ref 7.4–10.4)
RBC # BLD AUTO: 4.52 M/UL (ref 3.93–5.22)
WBC # BLD AUTO: 7.27 K/UL (ref 3.98–10.04)

## 2023-11-15 PROCEDURE — 36415 COLL VENOUS BLD VENIPUNCTURE: CPT

## 2023-11-15 PROCEDURE — 85025 COMPLETE CBC W/AUTO DIFF WBC: CPT

## 2023-11-21 LAB
HFE GENE MUT ANL BLD/T: NORMAL
HFE P.C282Y BLD/T QL: NORMAL
HFE P.H63D BLD/T QL: NORMAL
HFE P.S65C BLD/T QL: NEGATIVE
SPECIMEN SOURCE: NORMAL

## 2023-12-29 ENCOUNTER — OFFICE VISIT (OUTPATIENT)
Age: 58
End: 2023-12-29
Payer: COMMERCIAL

## 2023-12-29 VITALS
HEIGHT: 60 IN | RESPIRATION RATE: 20 BRPM | DIASTOLIC BLOOD PRESSURE: 70 MMHG | HEART RATE: 84 BPM | BODY MASS INDEX: 40.64 KG/M2 | WEIGHT: 207 LBS | OXYGEN SATURATION: 97 % | SYSTOLIC BLOOD PRESSURE: 120 MMHG | TEMPERATURE: 97.7 F

## 2023-12-29 DIAGNOSIS — H66.93 BILATERAL OTITIS MEDIA, UNSPECIFIED OTITIS MEDIA TYPE: ICD-10-CM

## 2023-12-29 DIAGNOSIS — J02.9 SORE THROAT: Primary | ICD-10-CM

## 2023-12-29 DIAGNOSIS — J06.9 UPPER RESPIRATORY TRACT INFECTION, UNSPECIFIED TYPE: ICD-10-CM

## 2023-12-29 DIAGNOSIS — Z11.52 ENCOUNTER FOR SCREENING FOR COVID-19: ICD-10-CM

## 2023-12-29 LAB
INFLUENZA A ANTIBODY: NEGATIVE
INFLUENZA B ANTIBODY: NEGATIVE
S PYO AG THROAT QL: NORMAL

## 2023-12-29 PROCEDURE — 3078F DIAST BP <80 MM HG: CPT | Performed by: NURSE PRACTITIONER

## 2023-12-29 PROCEDURE — 3074F SYST BP LT 130 MM HG: CPT | Performed by: NURSE PRACTITIONER

## 2023-12-29 PROCEDURE — 99203 OFFICE O/P NEW LOW 30 MIN: CPT | Performed by: NURSE PRACTITIONER

## 2023-12-29 RX ORDER — TRAZODONE HYDROCHLORIDE 50 MG/1
TABLET ORAL
COMMUNITY
Start: 2023-12-11

## 2023-12-29 RX ORDER — FLUCONAZOLE 150 MG/1
150 TABLET ORAL
Qty: 2 TABLET | Refills: 0 | Status: SHIPPED | OUTPATIENT
Start: 2023-12-29 | End: 2024-01-04

## 2023-12-29 RX ORDER — CEFDINIR 300 MG/1
300 CAPSULE ORAL 2 TIMES DAILY
Qty: 20 CAPSULE | Refills: 0 | Status: SHIPPED | OUTPATIENT
Start: 2023-12-29 | End: 2024-01-08

## 2023-12-30 NOTE — PATIENT INSTRUCTIONS
Plenty of fluids  Rest  OTC Tylenol or Motrin as needed   Omnicef as directed  Diflucan 2 tabs- take one in 3 days and repeat in 3 days, per patient request  Our office will call with COVID results in 24 hrs- or you can view on Mychart  Follow up with PCP or return to Urgent Care for worsening or unresolved symptoms.

## 2023-12-31 LAB — SARS-COV-2 N GENE RESP QL NAA+PROBE: DETECTED

## 2024-02-20 ENCOUNTER — APPOINTMENT (OUTPATIENT)
Dept: GENERAL RADIOLOGY | Age: 59
End: 2024-02-20
Payer: COMMERCIAL

## 2024-02-20 ENCOUNTER — HOSPITAL ENCOUNTER (EMERGENCY)
Age: 59
Discharge: HOME | End: 2024-02-20
Attending: STUDENT IN AN ORGANIZED HEALTH CARE EDUCATION/TRAINING PROGRAM
Payer: COMMERCIAL

## 2024-02-20 VITALS
TEMPERATURE: 97.3 F | BODY MASS INDEX: 39.84 KG/M2 | WEIGHT: 204 LBS | RESPIRATION RATE: 18 BRPM | SYSTOLIC BLOOD PRESSURE: 133 MMHG | HEART RATE: 79 BPM | DIASTOLIC BLOOD PRESSURE: 66 MMHG | OXYGEN SATURATION: 97 %

## 2024-02-20 DIAGNOSIS — M79.89 LEG SWELLING: Primary | ICD-10-CM

## 2024-02-20 LAB
ALBUMIN SERPL-MCNC: 4.3 G/DL (ref 3.5–5.2)
ALP SERPL-CCNC: 84 U/L (ref 35–104)
ALT SERPL-CCNC: 13 U/L (ref 5–33)
ANION GAP SERPL CALCULATED.3IONS-SCNC: 9 MMOL/L (ref 7–19)
AST SERPL-CCNC: 21 U/L (ref 5–32)
BASOPHILS # BLD: 0 K/UL (ref 0–0.2)
BASOPHILS NFR BLD: 0.7 % (ref 0–1)
BILIRUB SERPL-MCNC: 0.4 MG/DL (ref 0.2–1.2)
BNP BLD-MCNC: 193 PG/ML (ref 0–124)
BUN SERPL-MCNC: 7 MG/DL (ref 8–23)
CALCIUM SERPL-MCNC: 9.6 MG/DL (ref 8.8–10.2)
CHLORIDE SERPL-SCNC: 101 MMOL/L (ref 98–111)
CO2 SERPL-SCNC: 33 MMOL/L (ref 22–29)
CREAT SERPL-MCNC: 0.9 MG/DL (ref 0.5–0.9)
EOSINOPHIL # BLD: 0.2 K/UL (ref 0–0.6)
EOSINOPHIL NFR BLD: 3.4 % (ref 0–5)
ERYTHROCYTE [DISTWIDTH] IN BLOOD BY AUTOMATED COUNT: 13.8 % (ref 11.5–14.5)
GLUCOSE SERPL-MCNC: 103 MG/DL (ref 74–109)
HCT VFR BLD AUTO: 38.2 % (ref 37–47)
HGB BLD-MCNC: 12.5 G/DL (ref 12–16)
IMM GRANULOCYTES # BLD: 0 K/UL
LYMPHOCYTES # BLD: 2 K/UL (ref 1.1–4.5)
LYMPHOCYTES NFR BLD: 32.7 % (ref 20–40)
MAGNESIUM SERPL-MCNC: 1.9 MG/DL (ref 1.6–2.4)
MCH RBC QN AUTO: 29.9 PG (ref 27–31)
MCHC RBC AUTO-ENTMCNC: 32.7 G/DL (ref 33–37)
MCV RBC AUTO: 91.4 FL (ref 81–99)
MONOCYTES # BLD: 0.4 K/UL (ref 0–0.9)
MONOCYTES NFR BLD: 6.7 % (ref 0–10)
NEUTROPHILS # BLD: 3.4 K/UL (ref 1.5–7.5)
NEUTS SEG NFR BLD: 56.2 % (ref 50–65)
PLATELET # BLD AUTO: 205 K/UL (ref 130–400)
PMV BLD AUTO: 10 FL (ref 9.4–12.3)
POTASSIUM SERPL-SCNC: 2.9 MMOL/L (ref 3.5–5)
PROT SERPL-MCNC: 7 G/DL (ref 6.6–8.7)
RBC # BLD AUTO: 4.18 M/UL (ref 4.2–5.4)
SODIUM SERPL-SCNC: 143 MMOL/L (ref 136–145)
WBC # BLD AUTO: 6 K/UL (ref 4.8–10.8)

## 2024-02-20 PROCEDURE — 80053 COMPREHEN METABOLIC PANEL: CPT

## 2024-02-20 PROCEDURE — 93971 EXTREMITY STUDY: CPT

## 2024-02-20 PROCEDURE — 93005 ELECTROCARDIOGRAM TRACING: CPT | Performed by: STUDENT IN AN ORGANIZED HEALTH CARE EDUCATION/TRAINING PROGRAM

## 2024-02-20 PROCEDURE — 85025 COMPLETE CBC W/AUTO DIFF WBC: CPT

## 2024-02-20 PROCEDURE — 36415 COLL VENOUS BLD VENIPUNCTURE: CPT

## 2024-02-20 PROCEDURE — 96374 THER/PROPH/DIAG INJ IV PUSH: CPT

## 2024-02-20 PROCEDURE — 71045 X-RAY EXAM CHEST 1 VIEW: CPT

## 2024-02-20 PROCEDURE — 6360000002 HC RX W HCPCS: Performed by: STUDENT IN AN ORGANIZED HEALTH CARE EDUCATION/TRAINING PROGRAM

## 2024-02-20 PROCEDURE — 99285 EMERGENCY DEPT VISIT HI MDM: CPT

## 2024-02-20 PROCEDURE — 83880 ASSAY OF NATRIURETIC PEPTIDE: CPT

## 2024-02-20 PROCEDURE — 83735 ASSAY OF MAGNESIUM: CPT

## 2024-02-20 PROCEDURE — 6370000000 HC RX 637 (ALT 250 FOR IP): Performed by: STUDENT IN AN ORGANIZED HEALTH CARE EDUCATION/TRAINING PROGRAM

## 2024-02-20 RX ORDER — FUROSEMIDE 10 MG/ML
40 INJECTION INTRAMUSCULAR; INTRAVENOUS ONCE
Status: COMPLETED | OUTPATIENT
Start: 2024-02-20 | End: 2024-02-20

## 2024-02-20 RX ORDER — POTASSIUM CHLORIDE 20 MEQ/1
20 TABLET, EXTENDED RELEASE ORAL ONCE
Status: COMPLETED | OUTPATIENT
Start: 2024-02-20 | End: 2024-02-20

## 2024-02-20 RX ADMIN — FUROSEMIDE 40 MG: 10 INJECTION, SOLUTION INTRAMUSCULAR; INTRAVENOUS at 16:18

## 2024-02-20 RX ADMIN — POTASSIUM CHLORIDE 20 MEQ: 1500 TABLET, EXTENDED RELEASE ORAL at 16:18

## 2024-02-20 ASSESSMENT — ENCOUNTER SYMPTOMS
EYE PAIN: 0
SHORTNESS OF BREATH: 1
VOMITING: 0
COUGH: 0
EYE REDNESS: 0
ABDOMINAL PAIN: 0
CHEST TIGHTNESS: 0
SORE THROAT: 0
NAUSEA: 0
DIARRHEA: 0

## 2024-02-20 NOTE — ED PROVIDER NOTES
[facility] EMERGENCY DEPT  eMERGENCY dEPARTMENT eNCOUnter      Pt Name: Demetrice Marrufo  MRN: [id number]  Birthdate 1965  Date of evaluation: 2/20/2024  Provider: Tawny Mireles MD    CHIEF COMPLAINT       Chief Complaint   Patient presents with    Leg Swelling     Aditya leg swelling since last week, worsened over the weekend and prn lasix isn't helping         HISTORY OF PRESENT ILLNESS   (Location/Symptom, Timing/Onset,Context/Setting, Quality, Duration, Modifying Factors, Severity)  Note limiting factors.     HPI    Demetrice Marrufo is a 58 y.o. female with PMH of hemochromatosis, hypothyroidism, hypertension, hyperlipidemia who presents to the emergency department with CC of bilateral leg swelling worsening over the last few days, not responsive to as needed Lasix.  Denies history of heart failure.  She has noted that her right leg seems to swell a little bit more than her left leg and has had some pain in her right calf.  She has no history of DVT or PE.  She is not having chest pain but states she has had a little bit of shortness of breath.  Denies any fevers, chills, cough, congestion, nausea, vomiting, diarrhea.      NursingNotes were reviewed.    REVIEW OF SYSTEMS    (2-9 systems for level 4, 10 or more for level 5)     Review of Systems   Constitutional:  Negative for chills, fatigue and fever.   HENT:  Negative for congestion and sore throat.    Eyes:  Negative for pain and redness.   Respiratory:  Positive for shortness of breath. Negative for cough and chest tightness.    Cardiovascular:  Positive for leg swelling. Negative for chest pain.   Gastrointestinal:  Negative for abdominal pain, diarrhea, nausea and vomiting.   Genitourinary:  Negative for dysuria and urgency.   Musculoskeletal:  Negative for neck pain and neck stiffness.   Skin:  Negative for rash and wound.   Neurological:  Negative for seizures, syncope and headaches.   Psychiatric/Behavioral:  Negative for agitation and confusion.             PAST

## 2024-02-20 NOTE — PROGRESS NOTES
Vascular lab preliminary.  Right leg venous scan completed.  No evidence for DVT, SVT, or reflux noted at this time.  Final report pending.

## 2024-02-20 NOTE — DISCHARGE INSTRUCTIONS
Elevate your legs whenever possible to decrease swelling.     Begin taking lasix 20 mg tablets daily for 5 days starting tomorrow. Be sure to drink fluids to ensure you do not get dehydrated.    If you can tolerate them, compression stockings will be helpful to decrease swelling. You should also moderate your salt intake.

## 2024-02-21 LAB
EKG P AXIS: -3 DEGREES
EKG P-R INTERVAL: 150 MS
EKG Q-T INTERVAL: 418 MS
EKG QRS DURATION: 92 MS
EKG QTC CALCULATION (BAZETT): 430 MS
EKG T AXIS: 20 DEGREES

## 2024-02-21 PROCEDURE — 93010 ELECTROCARDIOGRAM REPORT: CPT | Performed by: INTERNAL MEDICINE

## 2024-02-28 ENCOUNTER — TRANSCRIBE ORDERS (OUTPATIENT)
Dept: ADMINISTRATIVE | Age: 59
End: 2024-02-28

## 2024-02-28 DIAGNOSIS — R60.9 EDEMA, UNSPECIFIED TYPE: Primary | ICD-10-CM

## 2024-03-06 ENCOUNTER — HOSPITAL ENCOUNTER (OUTPATIENT)
Dept: NON INVASIVE DIAGNOSTICS | Age: 59
Discharge: HOME | End: 2024-03-06
Attending: FAMILY MEDICINE
Payer: COMMERCIAL

## 2024-03-06 DIAGNOSIS — R60.9 EDEMA, UNSPECIFIED TYPE: ICD-10-CM

## 2024-03-06 PROCEDURE — 93356 MYOCRD STRAIN IMG SPCKL TRCK: CPT

## 2024-03-06 PROCEDURE — 6360000004 HC RX CONTRAST MEDICATION: Performed by: FAMILY MEDICINE

## 2024-03-06 PROCEDURE — C8929 TTE W OR WO FOL WCON,DOPPLER: HCPCS

## 2024-03-06 RX ADMIN — PERFLUTREN 1.5 ML: 6.52 INJECTION, SUSPENSION INTRAVENOUS at 12:05

## 2024-03-19 LAB
ALBUMIN SERPL-MCNC: 4.4 G/DL (ref 3.5–5.2)
ALP SERPL-CCNC: 93 U/L (ref 35–104)
ALT SERPL-CCNC: 13 U/L (ref 5–33)
ANION GAP SERPL CALCULATED.3IONS-SCNC: 9 MMOL/L (ref 7–19)
AST SERPL-CCNC: 19 U/L (ref 5–32)
BILIRUB SERPL-MCNC: 0.3 MG/DL (ref 0.2–1.2)
BUN SERPL-MCNC: 16 MG/DL (ref 8–23)
CALCIUM SERPL-MCNC: 9.9 MG/DL (ref 8.8–10.2)
CHLORIDE SERPL-SCNC: 100 MMOL/L (ref 98–111)
CO2 SERPL-SCNC: 32 MMOL/L (ref 22–29)
CREAT SERPL-MCNC: 0.8 MG/DL (ref 0.5–0.9)
GLUCOSE SERPL-MCNC: 87 MG/DL (ref 74–109)
POTASSIUM SERPL-SCNC: 3.8 MMOL/L (ref 3.5–5)
PROT SERPL-MCNC: 7.4 G/DL (ref 6.6–8.7)
SODIUM SERPL-SCNC: 141 MMOL/L (ref 136–145)

## 2024-05-15 ENCOUNTER — TELEPHONE (OUTPATIENT)
Dept: HEMATOLOGY | Age: 59
End: 2024-05-15

## 2024-05-15 NOTE — TELEPHONE ENCOUNTER
Called patient and reminded patient of their appointment on 5/17/2024 and patient confirmed they would be here.

## 2024-05-16 NOTE — PROGRESS NOTES
OP HEMATOLOGY/ONCOLOGY PROGRESS NOTE      Pt Name: Demetrice Marrufo  YOB: 1965  MRN: [id number]  PCP: Dr. Hilda Mcdoewll  Date of evaluation: 5/17/2024    History Obtained From:  patient, electronic medical record    CHIEF COMPLAINT:    Chief Complaint   Patient presents with    Follow-up     Elevated ferritin level     HISTORY OF PRESENT ILLNESS:    Demetrice Marrufo is a 58 y.o.  female has been under evaluation for elevated ferritin, felt reactive in nature.  She returns to the clinic for follow-up and review of additional serology including hemochromatosis DNA gene profile for further evaluation.  Returning to the clinic for to discuss serology completed for elevated ferritin.  Since last evaluated, Demetrice had an ER encounter at [facility] 2/20/2024 for bilateral lower extremity swelling that was unimproved by PRN Lasix.  Venous Doppler RLE was negative for evidence of DVT or SVT.  She was treated with 40 mg IV Lasix and instructed to take oral Lasix 20 mg daily x 5 and follow-up with PCP.  LVEF was 55-60% on 3/6/2024. Patient reports persistent lower extremity swelling, though improved.    CBC is unremarkable today, 5/17/2024 including a WBC of 5.26, Hgb 12.6 and platelet count 200,000.  Ferritin is improved at 660.6.    HEMATOLOGY HISTORY: Elevated ferritin  Demetrice Marrufo was seen in hematology consultation 1/13/2023, referred by Dr. Hilda Mcdowell for evaluation of elevated ferritin.     PMH significant for iron deficiency, hypertension, hyperlipidemia, obesity, allergies, arthritis, GERD, hypothyroidism, depression.    Demetrice states she was noted to have elevated ferritin level on routine serology.      Transferrin saturation: 32.55%    Labs 11/30/2022:  Sed Rate: 75  Hepatitis B: Negative  Copper: 140.3  KARL: not detected     Demetrice was referred to gastroenterology, evaluated by CHONG Costello 12/14/2022 who requested the following abdominal ultrasound:    US Abd 12/8/2022 at [facility]:  Liver:

## 2024-05-17 ENCOUNTER — HOSPITAL ENCOUNTER (OUTPATIENT)
Dept: INFUSION THERAPY | Age: 59
Discharge: HOME | End: 2024-05-17
Payer: COMMERCIAL

## 2024-05-17 ENCOUNTER — OFFICE VISIT (OUTPATIENT)
Dept: HEMATOLOGY | Age: 59
End: 2024-05-17
Payer: COMMERCIAL

## 2024-05-17 VITALS
SYSTOLIC BLOOD PRESSURE: 138 MMHG | HEIGHT: 60 IN | OXYGEN SATURATION: 97 % | WEIGHT: 212 LBS | TEMPERATURE: 97.2 F | DIASTOLIC BLOOD PRESSURE: 82 MMHG | BODY MASS INDEX: 41.62 KG/M2 | HEART RATE: 98 BPM

## 2024-05-17 DIAGNOSIS — Z86.39 HISTORY OF IRON DEFICIENCY: ICD-10-CM

## 2024-05-17 DIAGNOSIS — E83.110 HEMOCHROMATOSIS ASSOCIATED WITH COMPOUND HETEROZYGOUS MUTATION IN HFE GENE (HCC): Primary | ICD-10-CM

## 2024-05-17 DIAGNOSIS — E66.01 MORBID OBESITY WITH BMI OF 40.0-44.9, ADULT (HCC): ICD-10-CM

## 2024-05-17 DIAGNOSIS — R79.89 ELEVATED FERRITIN LEVEL: ICD-10-CM

## 2024-05-17 DIAGNOSIS — Z71.89 CARE PLAN DISCUSSED WITH PATIENT: ICD-10-CM

## 2024-05-17 DIAGNOSIS — K76.0 FATTY LIVER: ICD-10-CM

## 2024-05-17 LAB
BASOPHILS # BLD: 0.03 K/UL (ref 0.01–0.08)
BASOPHILS NFR BLD: 0.6 % (ref 0.1–1.2)
EOSINOPHIL # BLD: 0.15 K/UL (ref 0.04–0.54)
EOSINOPHIL NFR BLD: 2.9 % (ref 0.7–7)
ERYTHROCYTE [DISTWIDTH] IN BLOOD BY AUTOMATED COUNT: 13.2 % (ref 11.7–14.4)
FERRITIN SERPL-MCNC: 660.6 NG/ML (ref 13–150)
HCT VFR BLD AUTO: 37 % (ref 34.1–44.9)
HGB BLD-MCNC: 12.6 G/DL (ref 11.2–15.7)
IRON SATN MFR SERPL: 30 % (ref 14–50)
IRON SERPL-MCNC: 73 UG/DL (ref 37–145)
LYMPHOCYTES # BLD: 1.61 K/UL (ref 1.18–3.74)
LYMPHOCYTES NFR BLD: 30.6 % (ref 19.3–53.1)
MCH RBC QN AUTO: 30.5 PG (ref 25.6–32.2)
MCHC RBC AUTO-ENTMCNC: 34.1 G/DL (ref 32.3–35.5)
MCV RBC AUTO: 89.6 FL (ref 79.4–94.8)
MONOCYTES # BLD: 0.38 K/UL (ref 0.24–0.82)
MONOCYTES NFR BLD: 7.2 % (ref 4.7–12.5)
NEUTROPHILS # BLD: 3.07 K/UL (ref 1.56–6.13)
NEUTS SEG NFR BLD: 58.3 % (ref 34–71.1)
PLATELET # BLD AUTO: 200 K/UL (ref 182–369)
PMV BLD AUTO: 9.2 FL (ref 7.4–10.4)
RBC # BLD AUTO: 4.13 M/UL (ref 3.93–5.22)
TIBC SERPL-MCNC: 240 UG/DL (ref 250–400)
WBC # BLD AUTO: 5.26 K/UL (ref 3.98–10.04)

## 2024-05-17 PROCEDURE — 3079F DIAST BP 80-89 MM HG: CPT | Performed by: NURSE PRACTITIONER

## 2024-05-17 PROCEDURE — 99212 OFFICE O/P EST SF 10 MIN: CPT

## 2024-05-17 PROCEDURE — 36415 COLL VENOUS BLD VENIPUNCTURE: CPT

## 2024-05-17 PROCEDURE — 85025 COMPLETE CBC W/AUTO DIFF WBC: CPT

## 2024-05-17 PROCEDURE — 99214 OFFICE O/P EST MOD 30 MIN: CPT | Performed by: NURSE PRACTITIONER

## 2024-05-17 PROCEDURE — 3075F SYST BP GE 130 - 139MM HG: CPT | Performed by: NURSE PRACTITIONER

## 2024-09-05 ENCOUNTER — HOSPITAL ENCOUNTER (INPATIENT)
Age: 59
LOS: 2 days | Discharge: HOME | DRG: 313 | End: 2024-09-07
Attending: FAMILY MEDICINE | Admitting: FAMILY MEDICINE
Payer: COMMERCIAL

## 2024-09-05 ENCOUNTER — HOSPITAL ENCOUNTER (INPATIENT)
Dept: VASCULAR LAB | Age: 59
Discharge: HOME | DRG: 313 | End: 2024-09-07
Attending: FAMILY MEDICINE
Payer: COMMERCIAL

## 2024-09-05 ENCOUNTER — APPOINTMENT (OUTPATIENT)
Age: 59
DRG: 313 | End: 2024-09-05
Attending: FAMILY MEDICINE
Payer: COMMERCIAL

## 2024-09-05 DIAGNOSIS — R07.89 CHEST PRESSURE: Primary | ICD-10-CM

## 2024-09-05 DIAGNOSIS — R07.9 CHEST PAIN, UNSPECIFIED TYPE: ICD-10-CM

## 2024-09-05 DIAGNOSIS — R06.02 SHORTNESS OF BREATH: ICD-10-CM

## 2024-09-05 LAB
ALBUMIN SERPL-MCNC: 4.1 G/DL (ref 3.5–5.2)
ALP SERPL-CCNC: 88 U/L (ref 35–104)
ALT SERPL-CCNC: 10 U/L (ref 5–33)
ANION GAP SERPL CALCULATED.3IONS-SCNC: 10 MMOL/L (ref 7–19)
AST SERPL-CCNC: 14 U/L (ref 5–32)
BASOPHILS # BLD: 0 K/UL (ref 0–0.2)
BASOPHILS NFR BLD: 0.7 % (ref 0–1)
BILIRUB SERPL-MCNC: 0.3 MG/DL (ref 0.2–1.2)
BILIRUB UR QL STRIP: NEGATIVE
BNP BLD-MCNC: <36 PG/ML (ref 0–124)
BUN SERPL-MCNC: 13 MG/DL (ref 8–23)
CALCIUM SERPL-MCNC: 9.5 MG/DL (ref 8.8–10.2)
CHLORIDE SERPL-SCNC: 101 MMOL/L (ref 98–111)
CLARITY UR: CLEAR
CO2 SERPL-SCNC: 27 MMOL/L (ref 22–29)
COLOR UR: YELLOW
CREAT SERPL-MCNC: 0.6 MG/DL (ref 0.5–0.9)
D DIMER PPP FEU-MCNC: 0.55 UG/ML FEU (ref 0–0.48)
ECHO AO ASC DIAM: 2.9 CM
ECHO AO ASCENDING AORTA INDEX: 1.43 CM/M2
ECHO AO ROOT DIAM: 2.3 CM
ECHO AO ROOT INDEX: 1.13 CM/M2
ECHO AO SINUS VALSALVA DIAM: 2.6 CM
ECHO AO SINUS VALSALVA INDEX: 1.28 CM/M2
ECHO AO ST JNCT DIAM: 2.8 CM
ECHO AV AREA PEAK VELOCITY: 2.2 CM2
ECHO AV AREA VTI: 2.8 CM2
ECHO AV AREA/BSA PEAK VELOCITY: 1.1 CM2/M2
ECHO AV AREA/BSA VTI: 1.4 CM2/M2
ECHO AV MEAN GRADIENT: 5 MMHG
ECHO AV MEAN VELOCITY: 1 M/S
ECHO AV PEAK GRADIENT: 8 MMHG
ECHO AV PEAK VELOCITY: 1.5 M/S
ECHO AV VELOCITY RATIO: 0.6
ECHO AV VTI: 32 CM
ECHO BSA: 2.12 M2
ECHO BSA: 2.12 M2
ECHO IVC PROX: 1.5 CM
ECHO LA AREA 2C: 19.3 CM2
ECHO LA AREA 4C: 21.9 CM2
ECHO LA DIAMETER INDEX: 1.92 CM/M2
ECHO LA DIAMETER: 3.9 CM
ECHO LA MAJOR AXIS: 6.9 CM
ECHO LA MINOR AXIS: 6.5 CM
ECHO LA TO AORTIC ROOT RATIO: 1.7
ECHO LA VOL BP: 53 ML (ref 22–52)
ECHO LA VOL MOD A2C: 47 ML (ref 22–52)
ECHO LA VOL MOD A4C: 56 ML (ref 22–52)
ECHO LA VOL/BSA BIPLANE: 26 ML/M2 (ref 16–34)
ECHO LA VOLUME INDEX MOD A2C: 23 ML/M2 (ref 16–34)
ECHO LA VOLUME INDEX MOD A4C: 28 ML/M2 (ref 16–34)
ECHO LV E' LATERAL VELOCITY: 9 CM/S
ECHO LV E' SEPTAL VELOCITY: 8 CM/S
ECHO LV EDV A2C: 100 ML
ECHO LV EDV A4C: 112 ML
ECHO LV EDV INDEX A4C: 55 ML/M2
ECHO LV EDV NDEX A2C: 49 ML/M2
ECHO LV EJECTION FRACTION A2C: 61 %
ECHO LV EJECTION FRACTION A4C: 60 %
ECHO LV EJECTION FRACTION BIPLANE: 60 % (ref 55–100)
ECHO LV ESV A2C: 39 ML
ECHO LV ESV A4C: 45 ML
ECHO LV ESV INDEX A2C: 19 ML/M2
ECHO LV ESV INDEX A4C: 22 ML/M2
ECHO LV FRACTIONAL SHORTENING: 34 % (ref 28–44)
ECHO LV GLOBAL LONGITUDINAL STRAIN (GLS): -19.7 %
ECHO LV INTERNAL DIMENSION DIASTOLE INDEX: 2.32 CM/M2
ECHO LV INTERNAL DIMENSION DIASTOLIC: 4.7 CM (ref 3.9–5.3)
ECHO LV INTERNAL DIMENSION SYSTOLIC INDEX: 1.53 CM/M2
ECHO LV INTERNAL DIMENSION SYSTOLIC: 3.1 CM
ECHO LV ISOVOLUMETRIC RELAXATION TIME (IVRT): 85 MS
ECHO LV IVSD: 1 CM (ref 0.6–0.9)
ECHO LV MASS 2D: 175.8 G (ref 67–162)
ECHO LV MASS INDEX 2D: 86.6 G/M2 (ref 43–95)
ECHO LV POSTERIOR WALL DIASTOLIC: 1.1 CM (ref 0.6–0.9)
ECHO LV RELATIVE WALL THICKNESS RATIO: 0.47
ECHO LVOT AREA: 3.5 CM2
ECHO LVOT AV VTI INDEX: 0.8
ECHO LVOT DIAM: 2.1 CM
ECHO LVOT MEAN GRADIENT: 2 MMHG
ECHO LVOT PEAK GRADIENT: 3 MMHG
ECHO LVOT PEAK VELOCITY: 0.9 M/S
ECHO LVOT STROKE VOLUME INDEX: 43.7 ML/M2
ECHO LVOT SV: 88.6 ML
ECHO LVOT VTI: 25.6 CM
ECHO MV A VELOCITY: 0.88 M/S
ECHO MV ANNULUS DIAMETER: 2.7 CM
ECHO MV AREA VTI: 4.9 CM2
ECHO MV E DECELERATION TIME (DT): 208 MS
ECHO MV E VELOCITY: 1.02 M/S
ECHO MV E/A RATIO: 1.16
ECHO MV E/E' LATERAL: 11.33
ECHO MV E/E' RATIO (AVERAGED): 12.04
ECHO MV E/E' SEPTAL: 12.75
ECHO MV LVOT VTI INDEX: 0.71
ECHO MV MAX VELOCITY: 0.8 M/S
ECHO MV MEAN GRADIENT: 2 MMHG
ECHO MV MEAN VELOCITY: 0.6 M/S
ECHO MV PEAK GRADIENT: 2 MMHG
ECHO MV VTI: 18.2 CM
ECHO RA AREA 4C: 12 CM2
ECHO RA END SYSTOLIC VOLUME APICAL 4 CHAMBER INDEX BSA: 14 ML/M2
ECHO RA MAJOR AXIS INDEX: 2.27 CM/M2
ECHO RA MAJOR AXIS: 4.6 CM
ECHO RA MINOR AXIS INDEX: 1.77 CM/M2
ECHO RA MINOR AXIS: 3.6 CM
ECHO RA VOLUME: 29 ML
ECHO RV BASAL DIMENSION: 3 CM
ECHO RV INTERNAL DIMENSION: 2.8 CM
ECHO RV LONGITUDINAL DIMENSION: 7.9 CM
ECHO RV MID DIMENSION: 3.1 CM
ECHO RV TAPSE: 1.8 CM (ref 1.7–?)
EKG P AXIS: 33 DEGREES
EKG P-R INTERVAL: 150 MS
EKG Q-T INTERVAL: 380 MS
EKG QRS DURATION: 94 MS
EKG QTC CALCULATION (BAZETT): 427 MS
EKG T AXIS: 36 DEGREES
EOSINOPHIL # BLD: 0.1 K/UL (ref 0–0.6)
EOSINOPHIL NFR BLD: 1.7 % (ref 0–5)
ERYTHROCYTE [DISTWIDTH] IN BLOOD BY AUTOMATED COUNT: 12.4 % (ref 11.5–14.5)
ERYTHROCYTE [SEDIMENTATION RATE] IN BLOOD BY WESTERGREN METHOD: 32 MM/HR (ref 0–25)
GLUCOSE SERPL-MCNC: 84 MG/DL (ref 70–99)
GLUCOSE UR STRIP.AUTO-MCNC: NEGATIVE MG/DL
HCT VFR BLD AUTO: 38.6 % (ref 37–47)
HGB BLD-MCNC: 13.1 G/DL (ref 12–16)
HGB UR STRIP.AUTO-MCNC: NEGATIVE MG/L
IMM GRANULOCYTES # BLD: 0 K/UL
KETONES UR STRIP.AUTO-MCNC: NEGATIVE MG/DL
LEUKOCYTE ESTERASE UR QL STRIP.AUTO: NEGATIVE
LIPASE SERPL-CCNC: 27 U/L (ref 13–60)
LYMPHOCYTES # BLD: 2.2 K/UL (ref 1.1–4.5)
LYMPHOCYTES NFR BLD: 36.4 % (ref 20–40)
MCH RBC QN AUTO: 31.6 PG (ref 27–31)
MCHC RBC AUTO-ENTMCNC: 33.9 G/DL (ref 33–37)
MCV RBC AUTO: 93.2 FL (ref 81–99)
MONOCYTES # BLD: 0.5 K/UL (ref 0–0.9)
MONOCYTES NFR BLD: 9 % (ref 0–10)
NEUTROPHILS # BLD: 3.1 K/UL (ref 1.5–7.5)
NEUTS SEG NFR BLD: 51.9 % (ref 50–65)
NITRITE UR QL STRIP.AUTO: NEGATIVE
PH UR STRIP.AUTO: 6 [PH] (ref 5–8)
PLATELET # BLD AUTO: 208 K/UL (ref 130–400)
PMV BLD AUTO: 9.7 FL (ref 9.4–12.3)
POTASSIUM SERPL-SCNC: 3.7 MMOL/L (ref 3.5–5)
PROT SERPL-MCNC: 7.1 G/DL (ref 6.4–8.3)
PROT UR STRIP.AUTO-MCNC: NEGATIVE MG/DL
RBC # BLD AUTO: 4.14 M/UL (ref 4.2–5.4)
SODIUM SERPL-SCNC: 138 MMOL/L (ref 136–145)
SP GR UR STRIP.AUTO: 1.01 (ref 1–1.03)
TROPONIN, HIGH SENSITIVITY: 12 NG/L (ref 0–14)
TROPONIN, HIGH SENSITIVITY: 6 NG/L (ref 0–14)
TSH SERPL DL<=0.005 MIU/L-ACNC: 0.41 UIU/ML (ref 0.27–4.2)
UROBILINOGEN UR STRIP.AUTO-MCNC: 0.2 E.U./DL
VIT B12 SERPL-MCNC: 306 PG/ML (ref 232–1245)
WBC # BLD AUTO: 6 K/UL (ref 4.8–10.8)

## 2024-09-05 PROCEDURE — 83880 ASSAY OF NATRIURETIC PEPTIDE: CPT

## 2024-09-05 PROCEDURE — 81003 URINALYSIS AUTO W/O SCOPE: CPT

## 2024-09-05 PROCEDURE — 84443 ASSAY THYROID STIM HORMONE: CPT

## 2024-09-05 PROCEDURE — G0378 HOSPITAL OBSERVATION PER HR: HCPCS

## 2024-09-05 PROCEDURE — 93970 EXTREMITY STUDY: CPT

## 2024-09-05 PROCEDURE — 99254 IP/OBS CNSLTJ NEW/EST MOD 60: CPT | Performed by: INTERNAL MEDICINE

## 2024-09-05 PROCEDURE — 93306 TTE W/DOPPLER COMPLETE: CPT | Performed by: INTERNAL MEDICINE

## 2024-09-05 PROCEDURE — G0379 DIRECT REFER HOSPITAL OBSERV: HCPCS

## 2024-09-05 PROCEDURE — 85379 FIBRIN DEGRADATION QUANT: CPT

## 2024-09-05 PROCEDURE — 6360000002 HC RX W HCPCS: Performed by: FAMILY MEDICINE

## 2024-09-05 PROCEDURE — 6360000004 HC RX CONTRAST MEDICATION: Performed by: FAMILY MEDICINE

## 2024-09-05 PROCEDURE — 1200000000 HC SEMI PRIVATE

## 2024-09-05 PROCEDURE — 83690 ASSAY OF LIPASE: CPT

## 2024-09-05 PROCEDURE — 2580000003 HC RX 258: Performed by: FAMILY MEDICINE

## 2024-09-05 PROCEDURE — 85652 RBC SED RATE AUTOMATED: CPT

## 2024-09-05 PROCEDURE — 85025 COMPLETE CBC W/AUTO DIFF WBC: CPT

## 2024-09-05 PROCEDURE — 84484 ASSAY OF TROPONIN QUANT: CPT

## 2024-09-05 PROCEDURE — 93356 MYOCRD STRAIN IMG SPCKL TRCK: CPT | Performed by: INTERNAL MEDICINE

## 2024-09-05 PROCEDURE — 82607 VITAMIN B-12: CPT

## 2024-09-05 PROCEDURE — 93005 ELECTROCARDIOGRAM TRACING: CPT | Performed by: FAMILY MEDICINE

## 2024-09-05 PROCEDURE — 93010 ELECTROCARDIOGRAM REPORT: CPT | Performed by: INTERNAL MEDICINE

## 2024-09-05 PROCEDURE — 6370000000 HC RX 637 (ALT 250 FOR IP): Performed by: FAMILY MEDICINE

## 2024-09-05 PROCEDURE — 80053 COMPREHEN METABOLIC PANEL: CPT

## 2024-09-05 PROCEDURE — 93970 EXTREMITY STUDY: CPT | Performed by: SURGERY

## 2024-09-05 PROCEDURE — 36415 COLL VENOUS BLD VENIPUNCTURE: CPT

## 2024-09-05 PROCEDURE — C8929 TTE W OR WO FOL WCON,DOPPLER: HCPCS

## 2024-09-05 RX ORDER — QUETIAPINE FUMARATE 300 MG/1
600 TABLET, FILM COATED ORAL NIGHTLY
Status: DISCONTINUED | OUTPATIENT
Start: 2024-09-05 | End: 2024-09-07 | Stop reason: HOSPADM

## 2024-09-05 RX ORDER — CETIRIZINE HYDROCHLORIDE 10 MG/1
10 TABLET ORAL DAILY
Status: DISCONTINUED | OUTPATIENT
Start: 2024-09-05 | End: 2024-09-07 | Stop reason: HOSPADM

## 2024-09-05 RX ORDER — PANTOPRAZOLE SODIUM 40 MG/1
40 TABLET, DELAYED RELEASE ORAL
Status: DISCONTINUED | OUTPATIENT
Start: 2024-09-06 | End: 2024-09-07 | Stop reason: HOSPADM

## 2024-09-05 RX ORDER — LOSARTAN POTASSIUM 50 MG/1
50 TABLET ORAL DAILY
Status: DISCONTINUED | OUTPATIENT
Start: 2024-09-05 | End: 2024-09-07 | Stop reason: HOSPADM

## 2024-09-05 RX ORDER — LEVOTHYROXINE SODIUM 50 UG/1
50 TABLET ORAL DAILY
Status: DISCONTINUED | OUTPATIENT
Start: 2024-09-05 | End: 2024-09-07 | Stop reason: HOSPADM

## 2024-09-05 RX ORDER — IRBESARTAN AND HYDROCHLOROTHIAZIDE 150; 12.5 MG/1; MG/1
1 TABLET, FILM COATED ORAL DAILY
Status: DISCONTINUED | OUTPATIENT
Start: 2024-09-05 | End: 2024-09-05 | Stop reason: CLARIF

## 2024-09-05 RX ORDER — MEMANTINE HYDROCHLORIDE 10 MG/1
10 TABLET ORAL 2 TIMES DAILY
COMMUNITY

## 2024-09-05 RX ORDER — HYDROCHLOROTHIAZIDE 25 MG/1
12.5 TABLET ORAL DAILY
Status: DISCONTINUED | OUTPATIENT
Start: 2024-09-05 | End: 2024-09-07 | Stop reason: HOSPADM

## 2024-09-05 RX ORDER — ESTRADIOL 0.5 MG/1
0.5 TABLET ORAL DAILY
COMMUNITY

## 2024-09-05 RX ORDER — FUROSEMIDE 40 MG
40 TABLET ORAL 2 TIMES DAILY
COMMUNITY

## 2024-09-05 RX ORDER — ENOXAPARIN SODIUM 100 MG/ML
40 INJECTION SUBCUTANEOUS EVERY 24 HOURS
Status: DISCONTINUED | OUTPATIENT
Start: 2024-09-05 | End: 2024-09-07 | Stop reason: HOSPADM

## 2024-09-05 RX ORDER — PRAMIPEXOLE DIHYDROCHLORIDE 1 MG/1
1 TABLET ORAL 3 TIMES DAILY
Status: DISCONTINUED | OUTPATIENT
Start: 2024-09-05 | End: 2024-09-07 | Stop reason: HOSPADM

## 2024-09-05 RX ORDER — UREA 10 %
500 LOTION (ML) TOPICAL DAILY
Status: DISCONTINUED | OUTPATIENT
Start: 2024-09-06 | End: 2024-09-07 | Stop reason: HOSPADM

## 2024-09-05 RX ORDER — ALPRAZOLAM 0.5 MG
0.5 TABLET ORAL NIGHTLY PRN
Status: DISCONTINUED | OUTPATIENT
Start: 2024-09-05 | End: 2024-09-07 | Stop reason: HOSPADM

## 2024-09-05 RX ADMIN — ALPRAZOLAM 0.5 MG: 0.5 TABLET ORAL at 20:01

## 2024-09-05 RX ADMIN — QUETIAPINE FUMARATE 600 MG: 300 TABLET ORAL at 20:01

## 2024-09-05 RX ADMIN — PRAMIPEXOLE DIHYDROCHLORIDE 1 MG: 1 TABLET ORAL at 20:01

## 2024-09-05 RX ADMIN — ENOXAPARIN SODIUM 40 MG: 100 INJECTION SUBCUTANEOUS at 16:39

## 2024-09-05 RX ADMIN — SODIUM CHLORIDE, PRESERVATIVE FREE 1.5 ML: 5 INJECTION INTRAVENOUS at 15:43

## 2024-09-05 ASSESSMENT — ENCOUNTER SYMPTOMS
ABDOMINAL DISTENTION: 0
CONSTIPATION: 0
WHEEZING: 0
BLOOD IN STOOL: 0
DIARRHEA: 0
VOMITING: 0
SHORTNESS OF BREATH: 1
COUGH: 0
EYE DISCHARGE: 0
BACK PAIN: 0

## 2024-09-05 ASSESSMENT — PAIN DESCRIPTION - ORIENTATION: ORIENTATION: RIGHT

## 2024-09-05 ASSESSMENT — PAIN SCALES - GENERAL
PAINLEVEL_OUTOF10: 0
PAINLEVEL_OUTOF10: 8

## 2024-09-05 ASSESSMENT — PAIN DESCRIPTION - LOCATION: LOCATION: ABDOMEN

## 2024-09-05 NOTE — PLAN OF CARE
Problem: Discharge Planning  Goal: Discharge to home or other facility with appropriate resources  9/5/2024 1356 by Vonnie Dockery RN  Outcome: Progressing  9/5/2024 1355 by Vonnie Dockery RN  Outcome: Progressing     Problem: ABCDS Injury Assessment  Goal: Absence of physical injury  9/5/2024 1356 by Vonnie Dockery RN  Outcome: Progressing  9/5/2024 1355 by Vonnie Dockery RN  Outcome: Progressing     Problem: Pain  Goal: Verbalizes/displays adequate comfort level or baseline comfort level  9/5/2024 1356 by Vonnie Dockery RN  Outcome: Progressing  9/5/2024 1355 by Vonnie Dockery RN  Outcome: Progressing     Problem: Chronic Conditions and Co-morbidities  Goal: Patient's chronic conditions and co-morbidity symptoms are monitored and maintained or improved  Outcome: Progressing     Problem: Safety - Adult  Goal: Free from fall injury  Outcome: Progressing

## 2024-09-05 NOTE — CONSULTS
[facility]  Cardiology Consult      Requesting MD:  Hilda Mcdowell MD   Admit Status:         History obtained from:   [] Patient  [] Other (specify):     PROBLEM LIST:    Patient Active Problem List    Diagnosis Date Noted    Chest pressure      Priority: High    Chest pain 09/05/2024     Priority: Low    COVID-19 01/29/2021     Priority: Low    History of anemia 07/17/2018     Priority: Low    Essential hypertension 06/09/2018     Priority: Low    Depression 06/09/2018     Priority: Low    Hyperlipemia 06/09/2018     Priority: Low    Hypothyroidism 06/09/2018     Priority: Low    Syncope, near 06/08/2018     Priority: Low    Other chest pain 04/30/2012     Priority: Low     Overview Note:     4/27/2012  SE  negative for myocardial ischemia  10/1/17  lexiscan negative for myocardial ischemia, EF 71  %           1.  Atypical chest pain/shortness of breath, prior to negative Lexiscan study 10/2017 with normal LV systolic function.  2.  Heterozygous gene mutation for hemochromatosis without significant iron overload with elevated ferritin levels, fatty liver.  3.  Hypothyroidism.  4.  Hypertension.  5.  Severe obesity.    PRESENTATION: Demetrice Marrufo is a 59 y.o. year old female who presents with complaints of shortness of breath that has been ongoing for the last 2 days at rest and with activity with mild pressure-like chest pain that lasts seconds also occurring at rest without relationship to activity.  She works from home and is on the computer.  Symptoms occurring at rest while sitting.  No clear relationship to exertion.  Has been noticing some swelling more on the right leg over the last month for which she is taking Lasix daily.  proBNP less than 36.  Troponin 6.  TSH 0.41.  Borderline elevated D-dimer.  No significant ST-T changes.    REVIEW OF SYSTEMS:  Review of Systems   Constitutional:  Negative for activity change, fatigue and fever.   HENT:  Negative for ear pain,  TROPONINI:3)@    Last 3 BNP:          IMAGING:  No results found.    EKG: Sinus rhythm with no significant ST-T changes noted.    Assessment and Plan:    This is a 59 y.o. year old female with past medical history of hypertension, severe obesity, hypothyroidism, gene mutation for heterozygous hemochromatosis without significant iron overload, presenting with atypical rest symptoms with no evidence of myocardial injury, no obvious cardiac wall stress and no significant ST-T changes.    1.  Doubt obstructive CAD as etiology of current presentation.  Will obtain a Lexiscan/stress nuclear study tomorrow and reevaluate.  Hypertensive control.  2.  If ongoing symptoms, would consider pulmonary CTA.  No evidence of DVT in lower extremities reported.      Electronically signed by Rolando Tamez MD on 9/5/2024 at 4:48 PM    Thisdictation was generated by voice recognition computer software.  Although all attempts are made to edit the dictation for accuracy, there may be errors in the transcription that are not intended.

## 2024-09-05 NOTE — PROGRESS NOTES
4 Eyes Skin Assessment     NAME:  Demetrice Marrufo  YOB: 1965  MEDICAL RECORD NUMBER:  [id number]    The patient is being assessed for  Admission    I agree that at least one RN has performed a thorough Head to Toe Skin Assessment on the patient. ALL assessment sites listed below have been assessed.      Areas assessed by both nurses:    Head, Face, Ears, Shoulders, Back, Chest, Arms, Elbows, Hands, Sacrum. Buttock, Coccyx, Ischium, and Legs. Feet and Heels        Does the Patient have a Wound? No noted wound(s)       Clint Prevention initiated by RN: No  Wound Care Orders initiated by RN: No    Pressure Injury (Stage 3,4, Unstageable, DTI, NWPT, and Complex wounds) if present, place Wound referral order by RN under : No    New Ostomies, if present place, Ostomy referral order under : No     Nurse 1 eSignature: Electronically signed by Vonnie Dockery RN on 9/5/24 at 4:52 PM CDT    **SHARE this note so that the co-signing nurse can place an eSignature**    Nurse 2 eSignature: Electronically signed by Tawny Etienne RN on 9/5/24 at 4:53 PM CDT

## 2024-09-05 NOTE — PROGRESS NOTES
Demetrice Marrufo arrived to room # 718-1.   Presented with: complaining of fatigue and RUQ pain  Mental Status: Patient is oriented, alert, coherent, logical, thought processes intact, and able to concentrate and follow conversation.   Vitals:    09/05/24 1315   BP: (!) 149/88   Pulse: 92   Resp: 16   Temp: 97.5 °F (36.4 °C)   SpO2: 92%     Patient safety contract and falls prevention contract reviewed with patient Yes.  Oriented Patient to room.  Call light within reach. Yes.  Needs, issues or concerns expressed at this time: no.      Electronically signed by Tawny Etienne RN on 9/5/2024 at 1:38 PM

## 2024-09-06 ENCOUNTER — APPOINTMENT (OUTPATIENT)
Dept: NUCLEAR MEDICINE | Age: 59
DRG: 313 | End: 2024-09-06
Attending: FAMILY MEDICINE
Payer: COMMERCIAL

## 2024-09-06 ENCOUNTER — APPOINTMENT (OUTPATIENT)
Dept: CT IMAGING | Age: 59
DRG: 313 | End: 2024-09-06
Attending: FAMILY MEDICINE
Payer: COMMERCIAL

## 2024-09-06 LAB
NUC STRESS EJECTION FRACTION: 80 %
STRESS BASELINE DIAS BP: 80 MMHG
STRESS BASELINE HR: 74 BPM
STRESS BASELINE SYS BP: 144 MMHG
STRESS PEAK DIAS BP: 103 MMHG
STRESS PEAK SYS BP: 144 MMHG
STRESS PERCENT HR ACHIEVED: 86 %
STRESS POST PEAK HR: 136 BPM
STRESS RATE PRESSURE PRODUCT: NORMAL BPM*MMHG
STRESS STAGE 1 BP: NORMAL MMHG
STRESS STAGE 1 DURATION: 1 MIN:SEC
STRESS STAGE 1 HR: 133 BPM
STRESS STAGE 2 BP: NORMAL MMHG
STRESS STAGE 2 DURATION: 1 MIN:SEC
STRESS STAGE 2 HR: 136 BPM
STRESS STAGE 3 BP: NORMAL MMHG
STRESS STAGE 3 DURATION: 1 MIN:SEC
STRESS STAGE 3 HR: 127 BPM
STRESS STAGE 4 BP: NORMAL MMHG
STRESS STAGE 4 DURATION: 1 MIN:SEC
STRESS STAGE 4 HR: 116 BPM
STRESS STAGE 5 BP: NORMAL MMHG
STRESS STAGE 5 DURATION: 1 MIN:SEC
STRESS STAGE 5 HR: 108 BPM
STRESS STAGE RECOVERY 1 BP: NORMAL MMHG
STRESS STAGE RECOVERY 1 DURATION: 1 MIN:SEC
STRESS STAGE RECOVERY 1 HR: 100 BPM
STRESS STAGE RECOVERY 2 BP: NORMAL MMHG
STRESS STAGE RECOVERY 2 DURATION: 2 MIN:SEC
STRESS STAGE RECOVERY 2 HR: 99 BPM
STRESS STAGE RECOVERY 3 BP: NORMAL MMHG
STRESS STAGE RECOVERY 3 DURATION: 2 MIN:SEC
STRESS STAGE RECOVERY 3 HR: 99 BPM
STRESS TARGET HR: 159 BPM
TROPONIN, HIGH SENSITIVITY: 7 NG/L (ref 0–14)
TROPONIN, HIGH SENSITIVITY: 7 NG/L (ref 0–14)
TROPONIN, HIGH SENSITIVITY: 8 NG/L (ref 0–14)
TROPONIN, HIGH SENSITIVITY: 9 NG/L (ref 0–14)

## 2024-09-06 PROCEDURE — 6360000002 HC RX W HCPCS: Performed by: FAMILY MEDICINE

## 2024-09-06 PROCEDURE — 99232 SBSQ HOSP IP/OBS MODERATE 35: CPT | Performed by: INTERNAL MEDICINE

## 2024-09-06 PROCEDURE — 6360000004 HC RX CONTRAST MEDICATION: Performed by: FAMILY MEDICINE

## 2024-09-06 PROCEDURE — 93017 CV STRESS TEST TRACING ONLY: CPT

## 2024-09-06 PROCEDURE — 93016 CV STRESS TEST SUPVJ ONLY: CPT | Performed by: INTERNAL MEDICINE

## 2024-09-06 PROCEDURE — 84484 ASSAY OF TROPONIN QUANT: CPT

## 2024-09-06 PROCEDURE — 71275 CT ANGIOGRAPHY CHEST: CPT

## 2024-09-06 PROCEDURE — 1200000000 HC SEMI PRIVATE

## 2024-09-06 PROCEDURE — 78452 HT MUSCLE IMAGE SPECT MULT: CPT

## 2024-09-06 PROCEDURE — 78452 HT MUSCLE IMAGE SPECT MULT: CPT | Performed by: INTERNAL MEDICINE

## 2024-09-06 PROCEDURE — 94760 N-INVAS EAR/PLS OXIMETRY 1: CPT

## 2024-09-06 PROCEDURE — 93018 CV STRESS TEST I&R ONLY: CPT | Performed by: INTERNAL MEDICINE

## 2024-09-06 PROCEDURE — 6370000000 HC RX 637 (ALT 250 FOR IP): Performed by: FAMILY MEDICINE

## 2024-09-06 PROCEDURE — 3430000000 HC RX DIAGNOSTIC RADIOPHARMACEUTICAL: Performed by: INTERNAL MEDICINE

## 2024-09-06 PROCEDURE — A9502 TC99M TETROFOSMIN: HCPCS | Performed by: INTERNAL MEDICINE

## 2024-09-06 PROCEDURE — 36415 COLL VENOUS BLD VENIPUNCTURE: CPT

## 2024-09-06 RX ORDER — POTASSIUM CHLORIDE 1500 MG/1
20 TABLET, EXTENDED RELEASE ORAL DAILY
Status: DISCONTINUED | OUTPATIENT
Start: 2024-09-06 | End: 2024-09-07 | Stop reason: HOSPADM

## 2024-09-06 RX ORDER — MELATONIN 10 MG
10 CAPSULE ORAL NIGHTLY
Status: DISCONTINUED | OUTPATIENT
Start: 2024-09-06 | End: 2024-09-07 | Stop reason: HOSPADM

## 2024-09-06 RX ORDER — MEMANTINE HYDROCHLORIDE 5 MG/1
10 TABLET ORAL 2 TIMES DAILY
Status: DISCONTINUED | OUTPATIENT
Start: 2024-09-06 | End: 2024-09-07 | Stop reason: HOSPADM

## 2024-09-06 RX ORDER — IOPAMIDOL 755 MG/ML
70 INJECTION, SOLUTION INTRAVASCULAR
Status: COMPLETED | OUTPATIENT
Start: 2024-09-06 | End: 2024-09-06

## 2024-09-06 RX ORDER — POTASSIUM CHLORIDE 1500 MG/1
20 TABLET, EXTENDED RELEASE ORAL DAILY
Qty: 60 TABLET | Refills: 3 | Status: SHIPPED | OUTPATIENT
Start: 2024-09-07

## 2024-09-06 RX ORDER — TRAZODONE HYDROCHLORIDE 50 MG/1
50 TABLET, FILM COATED ORAL NIGHTLY
Status: DISCONTINUED | OUTPATIENT
Start: 2024-09-06 | End: 2024-09-07 | Stop reason: HOSPADM

## 2024-09-06 RX ORDER — REGADENOSON 0.08 MG/ML
0.4 INJECTION, SOLUTION INTRAVENOUS
Status: COMPLETED | OUTPATIENT
Start: 2024-09-06 | End: 2024-09-06

## 2024-09-06 RX ORDER — ACETAMINOPHEN 500 MG
500 TABLET ORAL EVERY 4 HOURS PRN
Status: DISCONTINUED | OUTPATIENT
Start: 2024-09-06 | End: 2024-09-07 | Stop reason: HOSPADM

## 2024-09-06 RX ADMIN — ACETAMINOPHEN 500 MG: 500 TABLET ORAL at 13:03

## 2024-09-06 RX ADMIN — POTASSIUM CHLORIDE 20 MEQ: 1500 TABLET, EXTENDED RELEASE ORAL at 14:34

## 2024-09-06 RX ADMIN — Medication 10 MG: at 21:52

## 2024-09-06 RX ADMIN — REGADENOSON 0.4 MG: 0.08 INJECTION, SOLUTION INTRAVENOUS at 10:25

## 2024-09-06 RX ADMIN — CETIRIZINE HYDROCHLORIDE 10 MG: 10 TABLET, FILM COATED ORAL at 10:41

## 2024-09-06 RX ADMIN — PRAMIPEXOLE DIHYDROCHLORIDE 1 MG: 1 TABLET ORAL at 14:34

## 2024-09-06 RX ADMIN — TETROFOSMIN 24 MILLICURIE: 0.23 INJECTION, POWDER, LYOPHILIZED, FOR SOLUTION INTRAVENOUS at 12:23

## 2024-09-06 RX ADMIN — ENOXAPARIN SODIUM 40 MG: 100 INJECTION SUBCUTANEOUS at 14:34

## 2024-09-06 RX ADMIN — MEMANTINE HYDROCHLORIDE 10 MG: 5 TABLET ORAL at 21:52

## 2024-09-06 RX ADMIN — MEMANTINE HYDROCHLORIDE 10 MG: 5 TABLET ORAL at 14:34

## 2024-09-06 RX ADMIN — HYDROCHLOROTHIAZIDE 12.5 MG: 25 TABLET ORAL at 10:45

## 2024-09-06 RX ADMIN — TETROFOSMIN 8 MILLICURIE: 0.23 INJECTION, POWDER, LYOPHILIZED, FOR SOLUTION INTRAVENOUS at 12:29

## 2024-09-06 RX ADMIN — CYANOCOBALAMIN TAB 500 MCG 500 MCG: 500 TAB at 10:42

## 2024-09-06 RX ADMIN — PRAMIPEXOLE DIHYDROCHLORIDE 1 MG: 1 TABLET ORAL at 21:52

## 2024-09-06 RX ADMIN — PANTOPRAZOLE SODIUM 40 MG: 40 TABLET, DELAYED RELEASE ORAL at 06:25

## 2024-09-06 RX ADMIN — PRAMIPEXOLE DIHYDROCHLORIDE 1 MG: 1 TABLET ORAL at 10:41

## 2024-09-06 RX ADMIN — LOSARTAN POTASSIUM 50 MG: 50 TABLET, FILM COATED ORAL at 10:42

## 2024-09-06 RX ADMIN — LEVOTHYROXINE SODIUM 50 MCG: 50 TABLET ORAL at 06:26

## 2024-09-06 RX ADMIN — TRAZODONE HYDROCHLORIDE 50 MG: 50 TABLET ORAL at 21:52

## 2024-09-06 RX ADMIN — IOPAMIDOL 70 ML: 755 INJECTION, SOLUTION INTRAVENOUS at 15:57

## 2024-09-06 RX ADMIN — QUETIAPINE FUMARATE 600 MG: 300 TABLET ORAL at 21:52

## 2024-09-06 ASSESSMENT — PAIN SCALES - GENERAL
PAINLEVEL_OUTOF10: 0
PAINLEVEL_OUTOF10: 3

## 2024-09-06 ASSESSMENT — PAIN DESCRIPTION - ORIENTATION: ORIENTATION: MID

## 2024-09-06 ASSESSMENT — PAIN DESCRIPTION - DESCRIPTORS: DESCRIPTORS: ACHING

## 2024-09-06 ASSESSMENT — PAIN DESCRIPTION - LOCATION: LOCATION: HEAD

## 2024-09-06 NOTE — H&P
History and Physical      CHIEF COMPLAINT:  SOA, CP    Reason for Admission:  SOA, CP    History Obtained From:  patient, chart    HISTORY OF PRESENT ILLNESS:      The patient is a 59 y.o. female who I admitted from my office with a few day h/o increasing SOA, chest pain. She had had some LE edema and pain also.She has had no fevers. No new pain issues.     Past Medical History:        Diagnosis Date    Anxiety     Bulging lumbar disc     L3-L4    Depression     GERD (gastroesophageal reflux disease)     HTN (hypertension)     Hyperlipidemia     Hypothyroid     Iron deficiency anemia      Past Surgical History:        Procedure Laterality Date    BREAST BIOPSY Right 2010    benign    CHOLECYSTECTOMY      HYSTERECTOMY (CERVIX STATUS UNKNOWN)  2008    OVARY REMOVAL Bilateral 2008    age 43    NM COLONOSCOPY FLX DX W/COLLJ SPEC WHEN PFRMD N/A 6/11/2018    Dr Gomez-nan, 5 yr recall    NM ESOPHAGOGASTRODUODENOSCOPY TRANSORAL DIAGNOSTIC N/A 6/11/2018    Dr Gomez-Neda (-)         Medications Prior to Admission:    Medications Prior to Admission: furosemide (LASIX) 40 MG tablet, Take 1 tablet by mouth in the morning and 1 tablet in the evening.  estradiol (ESTRACE) 0.5 MG tablet, Take 1 tablet by mouth daily  memantine (NAMENDA) 10 MG tablet, Take 1 tablet by mouth 2 times daily  traZODone (DESYREL) 50 MG tablet,   pramipexole (MIRAPEX) 1 MG tablet, Take 0.25 mg by mouth 3 times daily Take half a tablet  fluticasone (FLONASE) 50 MCG/ACT nasal spray, 1 spray by Each Nostril route daily (Patient not taking: Reported on 12/29/2023)  irbesartan-hydroCHLOROthiazide (AVALIDE) 150-12.5 MG per tablet, Take 1 tablet by mouth daily  Melatonin 10 MG TABS, Take by mouth nightly   cetirizine (ZYRTEC) 10 MG tablet, Take 1 tablet by mouth daily  omeprazole (PRILOSEC) 40 MG delayed release capsule, Take 20 mg by mouth daily   QUEtiapine (SEROQUEL) 300 MG tablet, Take 2 tablets by mouth nightly  levothyroxine (SYNTHROID) 50 MCG tablet,

## 2024-09-06 NOTE — PROGRESS NOTES
09/06/24 1300   Encounter Summary   Encounter Overview/Reason Initial Encounter   Service Provided For Patient  (in PCU)   Referral/Consult From Rounding   Support System Spouse   Complexity of Encounter Low   Begin Time 1200   End Time  1230   Total Time Calculated 30 min   Spiritual/Emotional needs   Type Spiritual Support   Advance Care Planning   Type ACP conversation   Assessment/Intervention/Outcome   Assessment Concerns with suffering;Hopeful   Intervention Active listening;Discussed belief system/Scientologist practices/millie;Prayer (assurance of)/[place]   Outcome Expressed feelings, needs, and concerns   Plan and Referrals   Plan/Referrals Continue Support (comment)   Does the patient have a [facility] PCP? Yes    to follow up after discharge? No     Spiritual Health Assessment/Progress Note  [facility]    (P) Initial Encounter,  ,  ,      Name: Demetrice Marrufo MRN: [id number]    Age: 59 y.o.     Sex: female   Language: English   Anabaptist: Congregational   Chest pain     Date: 9/6/2024            Total Time Calculated: (P) 30 min              Spiritual Assessment began in [facility] PROGRESSIVE CARE        Referral/Consult From: (P) Rounding   Encounter Overview/Reason: (P) Initial Encounter  Service Provided For: (P) Patient (in PCU)    Millie, Belief, Meaning:   Patient identifies as spiritual and has beliefs or practices that help with coping during difficult times  Family/Friends identify as spiritual and have beliefs or practices that help with coping during difficult times      Importance and Influence:  Patient has spiritual/personal beliefs that influence decisions regarding their health and has no beliefs influential to healthcare decision-making identified during this visit  Family/Friends no family/friends present    Community:  Patient is connected with a spiritual community  Family/Friends Other:      Assessment and Plan of Care:     Patient Interventions include: Facilitated expression of  thoughts and feelings, Explored spiritual coping/struggle/distress and theological reflection, Affirmed coping skills/support systems, and Assisted in Advance Care Planning conversation  Family/Friends Interventions include: Other:      Patient Plan of Care: Spiritual Care available upon further referral  Family/Friends Plan of Care: Other:      Electronically signed by Katelynn Macedo Mailain on 9/6/2024 at 1:02 PM

## 2024-09-06 NOTE — PLAN OF CARE
Problem: Discharge Planning  Goal: Discharge to home or other facility with appropriate resources  9/5/2024 2326 by Radha Matos RN  Outcome: Progressing  Flowsheets (Taken 9/5/2024 1936)  Discharge to home or other facility with appropriate resources:   Identify barriers to discharge with patient and caregiver   Arrange for needed discharge resources and transportation as appropriate  9/5/2024 1356 by Vonnie Dockery RN  Outcome: Progressing  9/5/2024 1355 by Vonnie Dockery RN  Outcome: Progressing  Flowsheets (Taken 9/5/2024 1315)  Discharge to home or other facility with appropriate resources: Identify barriers to discharge with patient and caregiver     Problem: ABCDS Injury Assessment  Goal: Absence of physical injury  9/5/2024 2326 by Radha Matos RN  Outcome: Progressing  Flowsheets (Taken 9/5/2024 1959)  Absence of Physical Injury: Implement safety measures based on patient assessment  9/5/2024 1356 by Vonnie Dockery RN  Outcome: Progressing  9/5/2024 1355 by Vonnie Dockery RN  Outcome: Progressing     Problem: Pain  Goal: Verbalizes/displays adequate comfort level or baseline comfort level  9/5/2024 2326 by Radha Matos RN  Outcome: Progressing  9/5/2024 1356 by Vonnie Dockery RN  Outcome: Progressing  9/5/2024 1355 by Vonnie Dockery RN  Outcome: Progressing     Problem: Chronic Conditions and Co-morbidities  Goal: Patient's chronic conditions and co-morbidity symptoms are monitored and maintained or improved  9/5/2024 2326 by Radha Matos RN  Outcome: Progressing  Flowsheets (Taken 9/5/2024 1936)  Care Plan - Patient's Chronic Conditions and Co-Morbidity Symptoms are Monitored and Maintained or Improved: Monitor and assess patient's chronic conditions and comorbid symptoms for stability, deterioration, or improvement  9/5/2024 1356 by Vonnie Dockery RN  Outcome: Progressing  Flowsheets (Taken 9/5/2024 1315)  Care Plan - Patient's Chronic Conditions and Co-Morbidity Symptoms are

## 2024-09-06 NOTE — PROGRESS NOTES
Monitor overnight and dc home tomorrow, per Dr. Maynard. Electronically signed by Vick Nielsen RN on 9/6/2024 at 5:54 PM

## 2024-09-06 NOTE — PROGRESS NOTES
Cardiology Progress Note Rolando Tamez MD      Patient:  Demetrice Marrufo  [id number]    Patient Active Problem List    Diagnosis Date Noted    Chest pressure      Priority: High    Chest pain 09/05/2024     Priority: Low    COVID-19 01/29/2021     Priority: Low    History of anemia 07/17/2018     Priority: Low    Essential hypertension 06/09/2018     Priority: Low    Depression 06/09/2018     Priority: Low    Hyperlipemia 06/09/2018     Priority: Low    Hypothyroidism 06/09/2018     Priority: Low    Syncope, near 06/08/2018     Priority: Low    Other chest pain 04/30/2012     Priority: Low     Overview Note:     4/27/2012  SE  negative for myocardial ischemia  10/1/17  lexiscan negative for myocardial ischemia, EF 71  %             Admit Date:  9/5/2024    Admission Problem List: Present on Admission:   Chest pain      Cardiac Specific Data:  Specialty Problems          Cardiology Problems    Other chest pain        Syncope, near        Essential hypertension        Hyperlipemia        * (Principal) Chest pain         1.  Atypical chest pain/shortness of breath, prior to negative Lexiscan study 10/2017 with normal LV systolic function, negative Lexiscan study 9/6/2024.  2.  Heterozygous gene mutation for hemochromatosis without significant iron overload with elevated ferritin levels, fatty liver.  3.  Hypothyroidism.  4.  Hypertension.  5.  Severe obesity.    Subjective:  Ms. Marrufo continues to have some shortness of breath with activity and did not do well on the treadmill and was switched to Lexiscan study.    Objective:   BP (!) 106/57   Pulse 83   Temp 98.6 °F (37 °C) (Temporal)   Resp 16   Ht 1.6 m (5' 3\")   Wt 99.4 kg (219 lb 3.2 oz)   SpO2 98%   BMI 38.83 kg/m²     No intake or output data in the 24 hours ending 09/06/24 1520    Prior to Admission medications    Medication Sig Start Date End Date Taking? Authorizing Provider   furosemide (LASIX) 40 MG tablet Take 1 tablet by mouth in the morning and 1

## 2024-09-07 VITALS
OXYGEN SATURATION: 98 % | SYSTOLIC BLOOD PRESSURE: 130 MMHG | HEIGHT: 63 IN | TEMPERATURE: 99 F | DIASTOLIC BLOOD PRESSURE: 93 MMHG | HEART RATE: 95 BPM | WEIGHT: 219.25 LBS | BODY MASS INDEX: 38.85 KG/M2 | RESPIRATION RATE: 16 BRPM

## 2024-09-07 LAB — TROPONIN, HIGH SENSITIVITY: 7 NG/L (ref 0–14)

## 2024-09-07 PROCEDURE — 36415 COLL VENOUS BLD VENIPUNCTURE: CPT

## 2024-09-07 PROCEDURE — 84484 ASSAY OF TROPONIN QUANT: CPT

## 2024-09-07 PROCEDURE — 6370000000 HC RX 637 (ALT 250 FOR IP): Performed by: FAMILY MEDICINE

## 2024-09-07 PROCEDURE — 6370000000 HC RX 637 (ALT 250 FOR IP): Performed by: INTERNAL MEDICINE

## 2024-09-07 RX ORDER — RANOLAZINE 500 MG/1
500 TABLET, EXTENDED RELEASE ORAL 2 TIMES DAILY
Status: DISCONTINUED | OUTPATIENT
Start: 2024-09-07 | End: 2024-09-07 | Stop reason: HOSPADM

## 2024-09-07 RX ORDER — RANOLAZINE 500 MG/1
500 TABLET, EXTENDED RELEASE ORAL 2 TIMES DAILY
Qty: 60 TABLET | Refills: 3 | Status: SHIPPED | OUTPATIENT
Start: 2024-09-07

## 2024-09-07 RX ADMIN — POTASSIUM CHLORIDE 20 MEQ: 1500 TABLET, EXTENDED RELEASE ORAL at 08:46

## 2024-09-07 RX ADMIN — PANTOPRAZOLE SODIUM 40 MG: 40 TABLET, DELAYED RELEASE ORAL at 06:07

## 2024-09-07 RX ADMIN — MEMANTINE HYDROCHLORIDE 10 MG: 5 TABLET ORAL at 08:45

## 2024-09-07 RX ADMIN — PRAMIPEXOLE DIHYDROCHLORIDE 1 MG: 1 TABLET ORAL at 08:45

## 2024-09-07 RX ADMIN — CETIRIZINE HYDROCHLORIDE 10 MG: 10 TABLET, FILM COATED ORAL at 08:45

## 2024-09-07 RX ADMIN — RANOLAZINE 500 MG: 500 TABLET, EXTENDED RELEASE ORAL at 08:45

## 2024-09-07 RX ADMIN — HYDROCHLOROTHIAZIDE 12.5 MG: 25 TABLET ORAL at 08:45

## 2024-09-07 RX ADMIN — CYANOCOBALAMIN TAB 500 MCG 500 MCG: 500 TAB at 08:45

## 2024-09-07 RX ADMIN — LOSARTAN POTASSIUM 50 MG: 50 TABLET, FILM COATED ORAL at 08:46

## 2024-09-07 RX ADMIN — LEVOTHYROXINE SODIUM 50 MCG: 50 TABLET ORAL at 06:07

## 2024-09-07 ASSESSMENT — PAIN SCALES - GENERAL: PAINLEVEL_OUTOF10: 0

## 2024-09-07 NOTE — PROGRESS NOTES
Cardiology Daily Note OMAR ROCHA MD      Patient:  Demetrice Marrufo  [id number]    Patient Active Problem List    Diagnosis Date Noted    Chest pressure     Chest pain 09/05/2024    COVID-19 01/29/2021    History of anemia 07/17/2018    Essential hypertension 06/09/2018    Depression 06/09/2018    Hyperlipemia 06/09/2018    Hypothyroidism 06/09/2018    Syncope, near 06/08/2018    Other chest pain 04/30/2012       Admit Date:  9/5/2024    Admission Problem List: Present on Admission:   Chest pain      Cardiac Specific Data:  Specialty Problems          Cardiology Problems    Other chest pain        Syncope, near        Essential hypertension        Hyperlipemia        * (Principal) Chest pain           Subjective:  Ms. Marrufo feelsozander. No new c/o    Objective:   /70   Pulse 83   Temp 98.4 °F (36.9 °C)   Resp 18   Ht 1.6 m (5' 3\")   Wt 99.4 kg (219 lb 3.2 oz)   SpO2 94%   BMI 38.83 kg/m²     No intake or output data in the 24 hours ending 09/07/24 0829    Prior to Admission medications    Medication Sig Start Date End Date Taking? Authorizing Provider   vitamin B-12 500 MCG tablet Take 1 tablet by mouth daily 9/7/24  Yes Hilda Mcdowell MD   potassium chloride (KLOR-CON M) 20 MEQ extended release tablet Take 1 tablet by mouth daily 9/7/24  Yes Hilda Mcdowell MD   furosemide (LASIX) 40 MG tablet Take 1 tablet by mouth in the morning and 1 tablet in the evening.   Yes ProviderPhillip MD   estradiol (ESTRACE) 0.5 MG tablet Take 1 tablet by mouth daily   Yes ProviderPhillip MD   memantine (NAMENDA) 10 MG tablet Take 1 tablet by mouth 2 times daily   Yes Phillip Sanchez MD   traZODone (DESYREL) 50 MG tablet  12/11/23   ProviderPhillip MD   pramipexole (MIRAPEX) 1 MG tablet Take 0.25 mg by mouth 3 times daily Take half a tablet    Phillip Sanchez MD   irbesartan-hydroCHLOROthiazide (AVALIDE) 150-12.5 MG per tablet Take 1 tablet by mouth daily    Provider

## 2024-09-07 NOTE — DISCHARGE SUMMARY
Discharge Summary     Date:9/7/2024        Patient Name:Demetrice Marrufo     YOB: 1965     Age:59 y.o.    Admit Date:9/5/2024   Admission Condition:stable   Discharged Condition:good  Discharge Date: 09/07/24     Discharge Diagnoses   Principal Problem:    Chest pain  Resolved Problems:    * No resolved hospital problems. *      Hospital Stay   Narrative of Hospital Course: 59-year-old patient presented with chest pain.  Had a recent Lexiscan that was negative.  Seen in consultation by cardiology.  No further Cardiologic workup was recommended.  An antianginal agent was started.  Due to the atypical nature recommendation was for CTA of the chest to rule out pulmonary embolism.  This was negative for PE.  Did show incidental finding of a 1.3 cm density in the breast.  Patient states she is aware and this has been worked up in the past.  She is cleared for discharge by cardiology and will be sending her home today for follow-up with Dr. Mcdowell's office    Consultants:   IP CONSULT TO CARDIOLOGY    Time Spent on Discharge:  25 minutes were spent in patient examination, evaluation, counseling as well as medication reconciliation, prescriptions for required medications, discharge plan and follow up.      Surgeries/Procedures Performed:        Treatments:   cardiac meds: Ranexa    Significant Diagnostic Studies:   Recent Labs:  CBC:   Lab Results   Component Value Date/Time    WBC 6.0 09/05/2024 02:14 PM    RBC 4.14 09/05/2024 02:14 PM    HGB 13.1 09/05/2024 02:14 PM    HCT 38.6 09/05/2024 02:14 PM    MCV 93.2 09/05/2024 02:14 PM    MCH 31.6 09/05/2024 02:14 PM    MCHC 33.9 09/05/2024 02:14 PM    RDW 12.4 09/05/2024 02:14 PM     09/05/2024 02:14 PM     CMP:    Lab Results   Component Value Date/Time    GLUCOSE 84 09/05/2024 02:14 PM     09/05/2024 02:14 PM    K 3.7 09/05/2024 02:14 PM    K 2.9 02/20/2024 01:50 PM     09/05/2024 02:14 PM    CO2 27 09/05/2024 02:14 PM    BUN 13 09/05/2024  02:14 PM    CREATININE 0.6 09/05/2024 02:14 PM    ANIONGAP 10 09/05/2024 02:14 PM    ALKPHOS 88 09/05/2024 02:14 PM    ALT 10 09/05/2024 02:14 PM    AST 14 09/05/2024 02:14 PM    BILITOT 0.3 09/05/2024 02:14 PM    ALBUMIN 4.1 09/05/2024 02:14 PM    LABGLOM >90 09/05/2024 02:14 PM    LABGLOM >60 03/19/2024 10:31 AM    GFRAA >59 10/11/2022 07:54 AM    CALCIUM 9.5 09/05/2024 02:14 PM       Radiology Last 7 Days:  CTA PULMONARY W CONTRAST    Result Date: 9/6/2024  1.  No evidence of pulmonary artery embolism. 2.  No acute abnormality of the chest. 3.  The central right breast does show a 13 mm nodular density.  Correlate with mammography as this is indeterminate on this exam . 4.  Other chronic non emergent findings as above.  All CT scans are performed using dose optimization techniques as appropriate to the performed exam and include at least one of the following: Automated exposure control, adjustment of the mA and/or kV according to size, and the use of iterative reconstruction technique.  ______________________________________ Electronically signed by: CORI JACKSON M.D. Date:     09/06/2024 Time:    16:12       Discharge Plan   Disposition: Home    Provider Follow-Up:   Hilda Mcdowell MD  [address] [place] [address] [place] [address] [phone]    Schedule an appointment as soon as possible for a visit in 1 week(s)  for hospital follow up.       Hospital/Incidental Findings Requiring Follow-Up:  Angina and breast density    Patient Instructions   Diet: cardiac diet    Activity: activity as tolerated    Other Instructions:   Follow-up with Dr. Mcdowell's office in 1 to 2 weeks.    Discharge Medications         Medication List        START taking these medications      cyanocobalamin 500 MCG tablet  Take 1 tablet by mouth daily     potassium chloride 20 MEQ extended release tablet  Commonly known as: KLOR-CON M  Take 1 tablet by mouth daily     ranolazine 500 MG extended release tablet  Commonly known

## 2024-09-18 ENCOUNTER — TELEPHONE (OUTPATIENT)
Dept: CARDIOLOGY CLINIC | Age: 59
End: 2024-09-18

## 2024-09-19 ENCOUNTER — OFFICE VISIT (OUTPATIENT)
Dept: NEUROLOGY | Age: 59
End: 2024-09-19
Payer: COMMERCIAL

## 2024-09-19 VITALS
DIASTOLIC BLOOD PRESSURE: 76 MMHG | SYSTOLIC BLOOD PRESSURE: 130 MMHG | OXYGEN SATURATION: 98 % | RESPIRATION RATE: 16 BRPM | HEART RATE: 76 BPM | BODY MASS INDEX: 38.8 KG/M2 | WEIGHT: 219 LBS | HEIGHT: 63 IN

## 2024-09-19 DIAGNOSIS — Z86.59 HISTORY OF ANXIETY: ICD-10-CM

## 2024-09-19 DIAGNOSIS — Z86.59 HISTORY OF DEPRESSION: ICD-10-CM

## 2024-09-19 DIAGNOSIS — R41.3 MEMORY LOSS: Primary | ICD-10-CM

## 2024-09-19 PROCEDURE — 3075F SYST BP GE 130 - 139MM HG: CPT | Performed by: PSYCHIATRY & NEUROLOGY

## 2024-09-19 PROCEDURE — 3078F DIAST BP <80 MM HG: CPT | Performed by: PSYCHIATRY & NEUROLOGY

## 2024-09-19 PROCEDURE — 99204 OFFICE O/P NEW MOD 45 MIN: CPT | Performed by: PSYCHIATRY & NEUROLOGY

## 2024-09-24 ENCOUNTER — OFFICE VISIT (OUTPATIENT)
Dept: CARDIOLOGY CLINIC | Age: 59
End: 2024-09-24
Payer: COMMERCIAL

## 2024-09-24 VITALS
DIASTOLIC BLOOD PRESSURE: 68 MMHG | SYSTOLIC BLOOD PRESSURE: 102 MMHG | BODY MASS INDEX: 40.67 KG/M2 | OXYGEN SATURATION: 97 % | WEIGHT: 221 LBS | HEART RATE: 95 BPM | HEIGHT: 62 IN

## 2024-09-24 DIAGNOSIS — E66.01 MORBID OBESITY WITH BMI OF 40.0-44.9, ADULT: ICD-10-CM

## 2024-09-24 DIAGNOSIS — R07.9 CHEST PAIN, UNSPECIFIED TYPE: Primary | ICD-10-CM

## 2024-09-24 DIAGNOSIS — I10 ESSENTIAL HYPERTENSION: ICD-10-CM

## 2024-09-24 PROCEDURE — 99213 OFFICE O/P EST LOW 20 MIN: CPT | Performed by: CLINICAL NURSE SPECIALIST

## 2024-09-24 PROCEDURE — 3074F SYST BP LT 130 MM HG: CPT | Performed by: CLINICAL NURSE SPECIALIST

## 2024-09-24 PROCEDURE — 3078F DIAST BP <80 MM HG: CPT | Performed by: CLINICAL NURSE SPECIALIST

## 2024-09-24 RX ORDER — NITROGLYCERIN 0.4 MG/1
0.4 TABLET SUBLINGUAL EVERY 5 MIN PRN
Qty: 25 TABLET | Refills: 3 | Status: SHIPPED | OUTPATIENT
Start: 2024-09-24

## 2024-09-24 ASSESSMENT — ENCOUNTER SYMPTOMS
SHORTNESS OF BREATH: 0
EYE REDNESS: 0
WHEEZING: 0
ABDOMINAL PAIN: 0
CHEST TIGHTNESS: 0
VOMITING: 0
FACIAL SWELLING: 0
NAUSEA: 0
COUGH: 0

## 2024-10-04 ENCOUNTER — TELEPHONE (OUTPATIENT)
Dept: NEUROLOGY | Age: 59
End: 2024-10-04

## 2024-10-04 NOTE — TELEPHONE ENCOUNTER
I have called and spoke with patient to let her know that I have her scheduled an Memory Test appointment to be done before she goes to have her MRI done the same day. Patient is aware.

## 2024-10-14 ENCOUNTER — HOSPITAL ENCOUNTER (OUTPATIENT)
Dept: MRI IMAGING | Age: 59
Discharge: HOME | End: 2024-10-14
Payer: COMMERCIAL

## 2024-10-14 DIAGNOSIS — R41.3 MEMORY LOSS: ICD-10-CM

## 2024-10-14 PROCEDURE — 70551 MRI BRAIN STEM W/O DYE: CPT

## 2024-10-16 ENCOUNTER — OFFICE VISIT (OUTPATIENT)
Dept: NEUROLOGY | Age: 59
End: 2024-10-16
Payer: COMMERCIAL

## 2024-10-16 VITALS
HEART RATE: 70 BPM | OXYGEN SATURATION: 97 % | BODY MASS INDEX: 40.67 KG/M2 | DIASTOLIC BLOOD PRESSURE: 70 MMHG | WEIGHT: 221 LBS | HEIGHT: 62 IN | RESPIRATION RATE: 16 BRPM | SYSTOLIC BLOOD PRESSURE: 134 MMHG

## 2024-10-16 DIAGNOSIS — R41.3 MEMORY LOSS: Primary | ICD-10-CM

## 2024-10-16 DIAGNOSIS — Z86.59 HISTORY OF ANXIETY: ICD-10-CM

## 2024-10-16 DIAGNOSIS — G25.81 RESTLESS LEG SYNDROME: ICD-10-CM

## 2024-10-16 PROCEDURE — 99213 OFFICE O/P EST LOW 20 MIN: CPT | Performed by: PSYCHIATRY & NEUROLOGY

## 2024-10-16 PROCEDURE — 3075F SYST BP GE 130 - 139MM HG: CPT | Performed by: PSYCHIATRY & NEUROLOGY

## 2024-10-16 PROCEDURE — 3078F DIAST BP <80 MM HG: CPT | Performed by: PSYCHIATRY & NEUROLOGY

## 2024-10-16 PROCEDURE — 96116 NUBHVL XM PHYS/QHP 1ST HR: CPT | Performed by: PSYCHIATRY & NEUROLOGY

## 2024-10-16 RX ORDER — DONEPEZIL HYDROCHLORIDE 5 MG/1
5 TABLET, FILM COATED ORAL NIGHTLY
Qty: 30 TABLET | Refills: 2 | Status: SHIPPED | OUTPATIENT
Start: 2024-10-16 | End: 2024-10-16 | Stop reason: SDUPTHER

## 2024-10-16 RX ORDER — DONEPEZIL HYDROCHLORIDE 5 MG/1
TABLET, FILM COATED ORAL
Qty: 30 TABLET | Refills: 2 | Status: SHIPPED | OUTPATIENT
Start: 2024-10-16
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
## 2024-10-24 ENCOUNTER — TELEPHONE (OUTPATIENT)
Dept: INTERVENTIONAL RADIOLOGY/VASCULAR | Age: 59
End: 2024-10-24

## 2024-11-19 ENCOUNTER — OFFICE VISIT (OUTPATIENT)
Dept: CARDIOLOGY CLINIC | Age: 59
End: 2024-11-19
Payer: COMMERCIAL

## 2024-11-19 VITALS
HEART RATE: 74 BPM | BODY MASS INDEX: 42.51 KG/M2 | OXYGEN SATURATION: 98 % | DIASTOLIC BLOOD PRESSURE: 86 MMHG | HEIGHT: 62 IN | WEIGHT: 231 LBS | SYSTOLIC BLOOD PRESSURE: 128 MMHG

## 2024-11-19 DIAGNOSIS — E78.2 MIXED HYPERLIPIDEMIA: Primary | ICD-10-CM

## 2024-11-19 DIAGNOSIS — R07.9 CHEST PAIN, UNSPECIFIED TYPE: ICD-10-CM

## 2024-11-19 DIAGNOSIS — I10 ESSENTIAL HYPERTENSION: ICD-10-CM

## 2024-11-19 DIAGNOSIS — E66.01 MORBID OBESITY WITH BMI OF 40.0-44.9, ADULT: ICD-10-CM

## 2024-11-19 PROCEDURE — 99213 OFFICE O/P EST LOW 20 MIN: CPT | Performed by: CLINICAL NURSE SPECIALIST

## 2024-11-19 PROCEDURE — 3079F DIAST BP 80-89 MM HG: CPT | Performed by: CLINICAL NURSE SPECIALIST

## 2024-11-19 PROCEDURE — 3074F SYST BP LT 130 MM HG: CPT | Performed by: CLINICAL NURSE SPECIALIST

## 2024-11-19 ASSESSMENT — ENCOUNTER SYMPTOMS
WHEEZING: 0
SHORTNESS OF BREATH: 0
VOMITING: 0
CHEST TIGHTNESS: 0
EYE REDNESS: 0
NAUSEA: 0
COUGH: 0
ABDOMINAL PAIN: 0
FACIAL SWELLING: 0

## 2024-11-19 NOTE — PROGRESS NOTES
[facility]  [address] [address]  Phone: [phone]  Fax: [phone]    OFFICE VISIT:  2024    Demetrice Marrufo - : 1965    Reason For Visit:  Demetrice is a 59 y.o. female who is here for Follow-up (Patient states she has ongoing chest discomfort.) and Chest pain, unspecified type       Diagnosis Orders   1. Mixed hyperlipidemia  ALT    AST    Lipid Panel      2. Chest pain, unspecified type        3. Essential hypertension        4. Morbid obesity with BMI of 40.0-44.9, adult                HPI  Patient is here for follow-up after admission for chest pain 2024.  She had a Lexiscan that was negative for ischemia.  Ranexa was added to her regimen.      Patient states she has had no further chest pain since the Ranexa was added to her regimen.  Her previous chest pain may have been exertional in nature, it also happened at rest.  She said the pain occurred across the chest, no radiation of the pain, mild associated shortness of air but no nausea or diaphoresis.    Other history includes hypertension, obesity    Patient is an RN with [employer].  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  She does no regular exercise    She does reports she has had some fleeting, sharp tingling sensations in her chest that lasts just a few seconds and resolved.  No exertional chest pain    Hilda Mcdowell MD is PCP.  Demetrice Marrufo has the following history as recorded in [facility]:    Patient Active Problem List    Diagnosis Date Noted    Chest pressure     Chest pain 2024    COVID-19 2021    History of anemia 2018    Essential hypertension 2018    Depression 2018    Hyperlipemia 2018    Hypothyroidism 2018    Syncope, near 2018    Other chest pain 2012     Past Medical History:   Diagnosis Date    Anxiety     Bulging lumbar disc     L3-L4    Depression     GERD (gastroesophageal reflux disease)

## 2024-11-19 NOTE — PATIENT INSTRUCTIONS
Return in about 3 months (around 2/19/2025) for Dr Flowers.  Encourage you on weight loss and exercise  Nitro as needed  Continue Ranexa   Check cholesterol fasting

## 2024-11-22 ENCOUNTER — TRANSCRIBE ORDERS (OUTPATIENT)
Dept: ADMINISTRATIVE | Facility: HOSPITAL | Age: 59
End: 2024-11-22

## 2024-11-22 ENCOUNTER — HOSPITAL ENCOUNTER (OUTPATIENT)
Dept: GENERAL RADIOLOGY | Age: 59
Discharge: HOME | End: 2024-11-22

## 2024-11-22 DIAGNOSIS — E78.2 MIXED HYPERLIPIDEMIA: ICD-10-CM

## 2024-11-22 DIAGNOSIS — R76.12 NONSPECIFIC REACTION TO CELL MEDIATED IMMUNITY MEASUREMENT OF GAMMA INTERFERON ANTIGEN RESPONSE WITHOUT ACTIVE TUBERCULOSIS: ICD-10-CM

## 2024-11-22 DIAGNOSIS — R79.89 ELEVATED FERRITIN: Primary | ICD-10-CM

## 2024-11-22 DIAGNOSIS — E83.110 HEMOCHROMATOSIS ASSOCIATED WITH MUTATION IN HFE GENE: ICD-10-CM

## 2024-11-22 LAB
ALT SERPL-CCNC: 9 U/L (ref 5–33)
AST SERPL-CCNC: 14 U/L (ref 5–32)
CHOLEST SERPL-MCNC: 171 MG/DL (ref 0–199)
HDLC SERPL-MCNC: 75 MG/DL (ref 40–60)
LDLC SERPL CALC-MCNC: 83 MG/DL
TRIGL SERPL-MCNC: 66 MG/DL (ref 0–149)

## 2024-11-22 PROCEDURE — 71046 X-RAY EXAM CHEST 2 VIEWS: CPT

## 2024-11-25 ENCOUNTER — TELEPHONE (OUTPATIENT)
Dept: HEMATOLOGY | Age: 59
End: 2024-11-25

## 2024-11-25 NOTE — TELEPHONE ENCOUNTER
Called patient with MRI Appointment Information: Friday, January 3, 2025 arrive @ 0230 for 3:00 appointment- Nothing to eat or drink 6 hrs prior to imaging- patient verbalized understanding-th

## 2024-11-26 ENCOUNTER — TELEPHONE (OUTPATIENT)
Dept: CARDIOLOGY CLINIC | Age: 59
End: 2024-11-26

## 2025-01-03 ENCOUNTER — HOSPITAL ENCOUNTER (OUTPATIENT)
Dept: MRI IMAGING | Facility: HOSPITAL | Age: 60
Discharge: HOME | End: 2025-01-03
Admitting: NURSE PRACTITIONER
Payer: COMMERCIAL

## 2025-01-03 DIAGNOSIS — R79.89 ELEVATED FERRITIN: ICD-10-CM

## 2025-01-03 DIAGNOSIS — E83.110 HEMOCHROMATOSIS ASSOCIATED WITH MUTATION IN HFE GENE: ICD-10-CM

## 2025-01-03 PROCEDURE — 74181 MRI ABDOMEN W/O CONTRAST: CPT

## 2025-01-08 ENCOUNTER — TELEPHONE (OUTPATIENT)
Dept: HEMATOLOGY | Age: 60
End: 2025-01-08

## 2025-01-08 NOTE — TELEPHONE ENCOUNTER

## 2025-01-30 ENCOUNTER — OFFICE VISIT (OUTPATIENT)
Age: 60
End: 2025-01-30
Payer: COMMERCIAL

## 2025-01-30 VITALS
TEMPERATURE: 97 F | HEIGHT: 62 IN | BODY MASS INDEX: 41.22 KG/M2 | OXYGEN SATURATION: 97 % | HEART RATE: 92 BPM | SYSTOLIC BLOOD PRESSURE: 128 MMHG | DIASTOLIC BLOOD PRESSURE: 78 MMHG | WEIGHT: 224 LBS | RESPIRATION RATE: 18 BRPM

## 2025-01-30 DIAGNOSIS — R09.89 CHEST CONGESTION: ICD-10-CM

## 2025-01-30 DIAGNOSIS — R05.1 ACUTE COUGH: ICD-10-CM

## 2025-01-30 DIAGNOSIS — R11.0 NAUSEA: ICD-10-CM

## 2025-01-30 DIAGNOSIS — B34.9 VIRAL SYNDROME: Primary | ICD-10-CM

## 2025-01-30 LAB
INFLUENZA A ANTIBODY: NORMAL
INFLUENZA B ANTIBODY: NORMAL
Lab: NORMAL
QC PASS/FAIL: NORMAL
SARS-COV-2, POC: NORMAL

## 2025-01-30 PROCEDURE — 87804 INFLUENZA ASSAY W/OPTIC: CPT

## 2025-01-30 PROCEDURE — 87811 SARS-COV-2 COVID19 W/OPTIC: CPT

## 2025-01-30 PROCEDURE — 3078F DIAST BP <80 MM HG: CPT

## 2025-01-30 PROCEDURE — 99213 OFFICE O/P EST LOW 20 MIN: CPT

## 2025-01-30 PROCEDURE — 3074F SYST BP LT 130 MM HG: CPT

## 2025-01-30 RX ORDER — BENZONATATE 100 MG/1
100-200 CAPSULE ORAL 3 TIMES DAILY PRN
Qty: 60 CAPSULE | Refills: 0 | Status: SHIPPED | OUTPATIENT
Start: 2025-01-30 | End: 2025-02-09

## 2025-01-30 RX ORDER — ONDANSETRON 4 MG/1
4 TABLET, ORALLY DISINTEGRATING ORAL 3 TIMES DAILY PRN
Qty: 21 TABLET | Refills: 0 | Status: SHIPPED | OUTPATIENT
Start: 2025-01-30

## 2025-01-30 RX ORDER — FLUTICASONE PROPIONATE 50 MCG
2 SPRAY, SUSPENSION (ML) NASAL DAILY
Qty: 16 G | Refills: 0 | Status: SHIPPED | OUTPATIENT
Start: 2025-01-30

## 2025-01-30 ASSESSMENT — ENCOUNTER SYMPTOMS
SORE THROAT: 1
ABDOMINAL PAIN: 0
VOMITING: 0
ALLERGIC/IMMUNOLOGIC NEGATIVE: 1
COLOR CHANGE: 0
COUGH: 1
SINUS PRESSURE: 1
DIARRHEA: 0
WHEEZING: 0
CHEST TIGHTNESS: 0
TROUBLE SWALLOWING: 0
VOICE CHANGE: 0
APNEA: 0
STRIDOR: 0
RHINORRHEA: 1
NAUSEA: 1
SHORTNESS OF BREATH: 0
CHOKING: 0

## 2025-01-30 NOTE — PROGRESS NOTES
[facility]  [facility]  [address] [place] [id number]  Dept: [phone]  Dept Fax: [phone]  Loc: [phone]    Demetrice Marrufo is a 59 y.o. female who presents today for her medical conditions/complaints as noted below.  Demetrice Marrufo is c/o of Congestion, Cough, Pharyngitis, and Generalized Body Aches (Symptom onset 3 days ago )        HPI:     Demetrice Marrufo is a(n) 59 y.o. year old female who presents today with concern of congestion, sore throat, ear fullness, postnasal drip and mildly productive cough with some aches and chills that began 3 days ago.  She is experiencing some nausea today.  No fever or vomiting or diarrhea.  No other concerns today.    Denies any recent antibiotic or steroid administration.      Past Medical History:   Diagnosis Date    Anxiety     Bulging lumbar disc     L3-L4    Depression     GERD (gastroesophageal reflux disease)     HTN (hypertension)     Hyperlipidemia     Hypothyroid     Iron deficiency anemia      Past Surgical History:   Procedure Laterality Date    BREAST BIOPSY Right 2010    benign    CHOLECYSTECTOMY      HYSTERECTOMY (CERVIX STATUS UNKNOWN)  2008    OVARY REMOVAL Bilateral 2008    age 43    RI COLONOSCOPY FLX DX W/COLLJ SPEC WHEN PFRMD N/A 6/11/2018    Dr Gomez-normal, 5 yr recall    RI ESOPHAGOGASTRODUODENOSCOPY TRANSORAL DIAGNOSTIC N/A 6/11/2018    Dr Gomez-Neda (-)       Family History   Problem Relation Age of Onset    Diabetes Mother     Mitral Valve Prolapse Mother     Colon Polyps Father     Stroke Father     No Known Problems Brother     Diabetes Maternal Aunt     Stomach Cancer Maternal Aunt     Heart Disease Maternal Uncle     Colon Cancer Neg Hx     Esophageal Cancer Neg Hx     Liver Cancer Neg Hx     Liver Disease Neg Hx     Rectal Cancer Neg Hx        Social History     Tobacco Use    Smoking status: Never    Smokeless tobacco: Never   Substance Use Topics    Alcohol

## 2025-01-30 NOTE — PATIENT INSTRUCTIONS
At this time I suspect symptoms are the result of a viral illness which should improve with time.  Follow-up if symptoms worsen or do not improve within the next week.    Blanca Sotelo called to pharmacy for cough.  We do not want to fully suppress your cough as a productive cough is beneficial to clear mucus from your lungs.  However use this medication as needed for comfort.  Flonase called to pharmacy for sinus congestion relief.  This medication works best if you take it daily.  Zofran called to pharmacy for nausea as needed.  Return to work when fever free for greater than 24 hours without the use of fever reducing medications.  Work note provided to return on Monday.      Viral syndrome     Many illnesses are caused by viruses. These conditions usually run their course in 7-14 days.  Antibiotics do not help fight viral infections and are not needed at this time. Viral syndromes are treated with symptomatic support. Stay hydrated by taking sips of water or non caffeinated, noncarbonated, and nonalcoholic beverages throughout the day. There are many over-the-counter options to treat viral illness symptoms.     Some of my OTC recommendations for symptomatic relief include:    - Tylenol (acetaminophen) and/or Motrin (ibuprofen) for fever, aches, pains, and sore throat  - Mucinex (guaifenesin) for congestion and/or cough  - use Coricidin HBP or Benadryl for sinus symptoms.  - Flonase (fluticasone) spray one to each side twice a day for 14 days to reduce sinus congestion and ear pressure   - Saline nasal spray for congestion  - Warm salt water gargles for sore throat, and hot water with honey and lemon    For immune support, you may consider supplementing with Vitamin D3 5000 units daily, Vitamin C 1000 mg daily and Zinc 50 mg daily.     Currently the CDC does not require a mandatory quarantine for COVID 19 infection.  For a COVID-19 , influenza or other significant viral infection the current recommendations

## 2025-02-26 ENCOUNTER — TRANSCRIBE ORDERS (OUTPATIENT)
Dept: ADMINISTRATIVE | Facility: HOSPITAL | Age: 60
End: 2025-02-26
Payer: COMMERCIAL

## 2025-02-26 ENCOUNTER — LAB (OUTPATIENT)
Dept: LAB | Facility: HOSPITAL | Age: 60
End: 2025-02-26
Payer: COMMERCIAL

## 2025-02-26 DIAGNOSIS — R05.1 ACUTE COUGH: ICD-10-CM

## 2025-02-26 DIAGNOSIS — R05.1 ACUTE COUGH: Primary | ICD-10-CM

## 2025-02-26 LAB
B PARAPERT DNA SPEC QL NAA+PROBE: NOT DETECTED
B PERT DNA SPEC QL NAA+PROBE: NOT DETECTED
C PNEUM DNA NPH QL NAA+NON-PROBE: NOT DETECTED
FLUAV SUBTYP SPEC NAA+PROBE: NOT DETECTED
FLUBV RNA ISLT QL NAA+PROBE: NOT DETECTED
HADV DNA SPEC NAA+PROBE: NOT DETECTED
HCOV 229E RNA SPEC QL NAA+PROBE: NOT DETECTED
HCOV HKU1 RNA SPEC QL NAA+PROBE: NOT DETECTED
HCOV NL63 RNA SPEC QL NAA+PROBE: NOT DETECTED
HCOV OC43 RNA SPEC QL NAA+PROBE: NOT DETECTED
HMPV RNA NPH QL NAA+NON-PROBE: NOT DETECTED
HPIV1 RNA ISLT QL NAA+PROBE: NOT DETECTED
HPIV2 RNA SPEC QL NAA+PROBE: NOT DETECTED
HPIV3 RNA NPH QL NAA+PROBE: NOT DETECTED
HPIV4 P GENE NPH QL NAA+PROBE: NOT DETECTED
M PNEUMO IGG SER IA-ACNC: NOT DETECTED
RHINOVIRUS RNA SPEC NAA+PROBE: NOT DETECTED
RSV RNA NPH QL NAA+NON-PROBE: NOT DETECTED
SARS-COV-2 RNA RESP QL NAA+PROBE: NOT DETECTED

## 2025-02-26 PROCEDURE — 0202U NFCT DS 22 TRGT SARS-COV-2: CPT

## 2025-02-26 RX ORDER — FLUTICASONE PROPIONATE 50 MCG
2 SPRAY, SUSPENSION (ML) NASAL DAILY
Qty: 16 G | Refills: 0 | OUTPATIENT
Start: 2025-02-26

## 2025-03-10 ENCOUNTER — TELEPHONE (OUTPATIENT)
Dept: HEMATOLOGY | Age: 60
End: 2025-03-10

## 2025-03-12 NOTE — PROGRESS NOTES
OP HEMATOLOGY/ONCOLOGY PROGRESS NOTE      Pt Name: Demetrice Marrufo  YOB: 1965  MRN: [id number]  PCP: Dr. Hilda Mcdowell  Date of evaluation: 3/13/25    History Obtained From:  patient, electronic medical record    CHIEF COMPLAINT:    Chief Complaint   Patient presents with    Follow-up     compound heterozygous hemochromatosis gene mutation      History of Present Illness  The patient is a 59-year-old  female returning to the clinic for follow-up of elevated ferritin level.   The workup revealed compound heterozygosity for hemochromatosis gene mutations H63D and C282Y.   MRI abdomen without contrast on 01/03/2025 at [facility] showed mild liver iron overload and mild hepatic steatosis without morphologic changes of cirrhosis.   The last ferritin level was 660 on 05/17/2024.  LFTs have been normal.  She returns to the clinic to discuss these findings and treatment recommendations moving forward.  She reports no new health issues since her last visit. She has not required hospitalization or treatment for any infections.    CBC today 20/13/2025 includes a WBC of 5.13, Hgb 12.3 with MCV 90.6 and platelet count 229,000.      HEMATOLOGY HISTORY: Compound heterozygous hemochromatosis gene mutations H63D and C282Y with elevated ferritin  Demetrice Marrufo was seen in hematology consultation 1/13/2023, referred by Dr. Hilda Mcdowell for evaluation of elevated ferritin.   PMH significant for iron deficiency, hypertension, hyperlipidemia, obesity, allergies, arthritis, GERD, hypothyroidism, depression.  Demetrice states she was noted to have elevated ferritin level on routine serology.      Transferrin saturation: 32.55%    Labs 11/30/2022:  Sed Rate: 75  Hepatitis B: Negative  Copper: 140.3  KARL: not detected     Demetrice was referred to gastroenterology, evaluated by CHONG Costello 12/14/2022 who requested the following abdominal ultrasound:    US Abd 12/8/2022 at [facility]:  Liver: Right hepatic lobe length: 19.1

## 2025-03-13 ENCOUNTER — OFFICE VISIT (OUTPATIENT)
Dept: HEMATOLOGY | Age: 60
End: 2025-03-13

## 2025-03-13 ENCOUNTER — TRANSCRIBE ORDERS (OUTPATIENT)
Dept: ADMINISTRATIVE | Facility: HOSPITAL | Age: 60
End: 2025-03-13
Payer: COMMERCIAL

## 2025-03-13 ENCOUNTER — HOSPITAL ENCOUNTER (OUTPATIENT)
Dept: INFUSION THERAPY | Age: 60
Discharge: HOME | End: 2025-03-13
Payer: COMMERCIAL

## 2025-03-13 VITALS
HEART RATE: 86 BPM | BODY MASS INDEX: 41.59 KG/M2 | DIASTOLIC BLOOD PRESSURE: 88 MMHG | TEMPERATURE: 97.9 F | OXYGEN SATURATION: 95 % | HEIGHT: 62 IN | SYSTOLIC BLOOD PRESSURE: 138 MMHG | WEIGHT: 226 LBS

## 2025-03-13 DIAGNOSIS — Z71.89 CARE PLAN DISCUSSED WITH PATIENT: ICD-10-CM

## 2025-03-13 DIAGNOSIS — Z14.8 CARRIER OF HEMOCHROMATOSIS HFE GENE MUTATION: ICD-10-CM

## 2025-03-13 DIAGNOSIS — K76.0 FATTY LIVER: ICD-10-CM

## 2025-03-13 DIAGNOSIS — E66.01 MORBID OBESITY WITH BMI OF 40.0-44.9, ADULT: ICD-10-CM

## 2025-03-13 DIAGNOSIS — E83.110 HEMOCHROMATOSIS ASSOCIATED WITH COMPOUND HETEROZYGOUS MUTATION IN HFE GENE: ICD-10-CM

## 2025-03-13 DIAGNOSIS — R79.89 ELEVATED FERRITIN LEVEL: ICD-10-CM

## 2025-03-13 DIAGNOSIS — Z01.812 PRE-PROCEDURAL LABORATORY EXAMINATION: ICD-10-CM

## 2025-03-13 DIAGNOSIS — R79.89 ELEVATED FERRITIN LEVEL: Primary | ICD-10-CM

## 2025-03-13 DIAGNOSIS — Z14.8 CARRIER OF HEMOCHROMATOSIS HFE GENE MUTATION: Primary | ICD-10-CM

## 2025-03-13 LAB
ALBUMIN SERPL-MCNC: 3.8 G/DL (ref 3.5–5.2)
ALP SERPL-CCNC: 74 U/L (ref 35–104)
ALT SERPL-CCNC: 15 U/L (ref 5–33)
ANION GAP SERPL CALCULATED.3IONS-SCNC: 11 MMOL/L (ref 7–19)
AST SERPL-CCNC: 21 U/L (ref 5–32)
BASOPHILS # BLD: 0.02 K/UL (ref 0–0.2)
BASOPHILS NFR BLD: 0.4 % (ref 0–1)
BILIRUB SERPL-MCNC: <0.2 MG/DL (ref 0–1.2)
BUN SERPL-MCNC: 10 MG/DL (ref 8–23)
CALCIUM SERPL-MCNC: 8.9 MG/DL (ref 8.8–10.2)
CHLORIDE SERPL-SCNC: 100 MMOL/L (ref 98–107)
CO2 SERPL-SCNC: 29 MMOL/L (ref 22–29)
CREAT SERPL-MCNC: 0.9 MG/DL (ref 0.5–0.9)
EOSINOPHIL # BLD: 0.28 K/UL (ref 0–0.6)
EOSINOPHIL NFR BLD: 5.5 % (ref 0–5)
ERYTHROCYTE [DISTWIDTH] IN BLOOD BY AUTOMATED COUNT: 13.1 % (ref 11.5–14.5)
FERRITIN SERPL-MCNC: 857 NG/ML (ref 13–150)
GLUCOSE SERPL-MCNC: 135 MG/DL (ref 70–99)
HCT VFR BLD AUTO: 35.5 % (ref 37–47)
HGB BLD-MCNC: 12.3 G/DL (ref 12–16)
IRON SATN MFR SERPL: 34 % (ref 15–50)
IRON SERPL-MCNC: 78 UG/DL (ref 37–145)
LYMPHOCYTES # BLD: 1.39 K/UL (ref 1.1–4.5)
LYMPHOCYTES NFR BLD: 27.1 % (ref 20–40)
MCH RBC QN AUTO: 31.4 PG (ref 27–31)
MCHC RBC AUTO-ENTMCNC: 34.6 G/DL (ref 33–37)
MCV RBC AUTO: 90.6 FL (ref 81–99)
MONOCYTES # BLD: 0.35 K/UL (ref 0–0.9)
MONOCYTES NFR BLD: 6.8 % (ref 1–10)
NEUTROPHILS # BLD: 3.07 K/UL (ref 1.5–7.5)
NEUTS SEG NFR BLD: 59.8 % (ref 50–65)
PLATELET # BLD AUTO: 229 K/UL (ref 130–400)
PMV BLD AUTO: 9.6 FL (ref 9.4–12.3)
POTASSIUM SERPL-SCNC: 3.8 MMOL/L (ref 3.5–5.1)
PROT SERPL-MCNC: 6.8 G/DL (ref 6.4–8.3)
RBC # BLD AUTO: 3.92 M/UL (ref 4.2–5.4)
SODIUM SERPL-SCNC: 140 MMOL/L (ref 136–145)
TIBC SERPL-MCNC: 229 UG/DL (ref 250–400)
WBC # BLD AUTO: 5.13 K/UL (ref 4.8–10.8)

## 2025-03-13 PROCEDURE — 99213 OFFICE O/P EST LOW 20 MIN: CPT

## 2025-03-13 PROCEDURE — 83550 IRON BINDING TEST: CPT

## 2025-03-13 PROCEDURE — 80053 COMPREHEN METABOLIC PANEL: CPT

## 2025-03-13 PROCEDURE — 85025 COMPLETE CBC W/AUTO DIFF WBC: CPT

## 2025-03-13 PROCEDURE — 83540 ASSAY OF IRON: CPT

## 2025-03-13 PROCEDURE — 82728 ASSAY OF FERRITIN: CPT

## 2025-03-13 PROCEDURE — 36415 COLL VENOUS BLD VENIPUNCTURE: CPT

## 2025-03-14 ASSESSMENT — ENCOUNTER SYMPTOMS
VOMITING: 0
ABDOMINAL PAIN: 0
WHEEZING: 0
EYE DISCHARGE: 0
BACK PAIN: 1
DIARRHEA: 0
COUGH: 0
SORE THROAT: 0
EYE ITCHING: 0
NAUSEA: 0
SHORTNESS OF BREATH: 1
TROUBLE SWALLOWING: 0
CONSTIPATION: 0

## 2025-03-18 ENCOUNTER — TELEPHONE (OUTPATIENT)
Dept: INTERVENTIONAL RADIOLOGY/VASCULAR | Age: 60
End: 2025-03-18

## 2025-03-18 ENCOUNTER — HOSPITAL ENCOUNTER (OUTPATIENT)
Dept: INFUSION THERAPY | Age: 60
Discharge: HOME | End: 2025-03-18
Payer: COMMERCIAL

## 2025-03-18 DIAGNOSIS — Z01.812 PRE-PROCEDURAL LABORATORY EXAMINATION: ICD-10-CM

## 2025-03-18 LAB
INR PPP: 1.05 (ref 0.88–1.18)
PROTHROMBIN TIME: 13.4 SEC (ref 12–14.6)

## 2025-03-18 PROCEDURE — 36415 COLL VENOUS BLD VENIPUNCTURE: CPT

## 2025-03-18 PROCEDURE — 85610 PROTHROMBIN TIME: CPT

## 2025-03-18 NOTE — TELEPHONE ENCOUNTER
Spoke with pt. Discussed upcoming procedure scheduled for 03/20. Procedure explained, questions answered.

## 2025-03-20 ENCOUNTER — HOSPITAL ENCOUNTER (OUTPATIENT)
Dept: CT IMAGING | Age: 60
Discharge: HOME | End: 2025-03-20
Payer: COMMERCIAL

## 2025-03-20 VITALS
RESPIRATION RATE: 16 BRPM | DIASTOLIC BLOOD PRESSURE: 54 MMHG | SYSTOLIC BLOOD PRESSURE: 128 MMHG | HEART RATE: 68 BPM | OXYGEN SATURATION: 98 % | TEMPERATURE: 97.4 F

## 2025-03-20 DIAGNOSIS — Z14.8 CARRIER OF HEMOCHROMATOSIS HFE GENE MUTATION: Primary | ICD-10-CM

## 2025-03-20 DIAGNOSIS — K76.0 FATTY LIVER: ICD-10-CM

## 2025-03-20 DIAGNOSIS — R79.89 ELEVATED FERRITIN LEVEL: ICD-10-CM

## 2025-03-20 DIAGNOSIS — Z14.8 CARRIER OF HEMOCHROMATOSIS HFE GENE MUTATION: ICD-10-CM

## 2025-03-20 LAB
Lab: NORMAL
REPORT: NORMAL
THIS TEST SENT TO: NORMAL

## 2025-03-20 PROCEDURE — 6370000000 HC RX 637 (ALT 250 FOR IP): Performed by: RADIOLOGY

## 2025-03-20 PROCEDURE — 88307 TISSUE EXAM BY PATHOLOGIST: CPT

## 2025-03-20 PROCEDURE — 77012 CT SCAN FOR NEEDLE BIOPSY: CPT

## 2025-03-20 PROCEDURE — 6360000002 HC RX W HCPCS: Performed by: RADIOLOGY

## 2025-03-20 RX ORDER — OXYCODONE AND ACETAMINOPHEN 5; 325 MG/1; MG/1
1 TABLET ORAL ONCE
Refills: 0 | Status: COMPLETED | OUTPATIENT
Start: 2025-03-20 | End: 2025-03-20

## 2025-03-20 RX ORDER — MIDAZOLAM HYDROCHLORIDE 5 MG/ML
INJECTION, SOLUTION INTRAMUSCULAR; INTRAVENOUS PRN
Status: COMPLETED | OUTPATIENT
Start: 2025-03-20 | End: 2025-03-20

## 2025-03-20 RX ORDER — SODIUM CHLORIDE 0.9 % (FLUSH) 0.9 %
5-40 SYRINGE (ML) INJECTION PRN
Status: DISCONTINUED | OUTPATIENT
Start: 2025-03-20 | End: 2025-03-22 | Stop reason: HOSPADM

## 2025-03-20 RX ORDER — FENTANYL CITRATE 50 UG/ML
INJECTION, SOLUTION INTRAMUSCULAR; INTRAVENOUS PRN
Status: COMPLETED | OUTPATIENT
Start: 2025-03-20 | End: 2025-03-20

## 2025-03-20 RX ADMIN — MIDAZOLAM HYDROCHLORIDE 1 MG: 5 INJECTION, SOLUTION INTRAMUSCULAR; INTRAVENOUS at 08:47

## 2025-03-20 RX ADMIN — OXYCODONE HYDROCHLORIDE AND ACETAMINOPHEN 1 TABLET: 5; 325 TABLET ORAL at 09:30

## 2025-03-20 RX ADMIN — FENTANYL CITRATE 50 MCG: 50 INJECTION, SOLUTION INTRAMUSCULAR; INTRAVENOUS at 08:46

## 2025-03-20 ASSESSMENT — PAIN DESCRIPTION - DESCRIPTORS: DESCRIPTORS: DISCOMFORT

## 2025-03-20 ASSESSMENT — PAIN DESCRIPTION - ORIENTATION: ORIENTATION: RIGHT

## 2025-03-20 ASSESSMENT — PAIN - FUNCTIONAL ASSESSMENT: PAIN_FUNCTIONAL_ASSESSMENT: ACTIVITIES ARE NOT PREVENTED

## 2025-03-20 ASSESSMENT — PAIN DESCRIPTION - LOCATION: LOCATION: FLANK

## 2025-03-20 ASSESSMENT — PAIN SCALES - GENERAL: PAINLEVEL_OUTOF10: 5

## 2025-03-20 NOTE — DISCHARGE INSTRUCTIONS
[facility]  PATIENT EDUCATION  Liver Biopsy Discharge Instructions  Care Needed at Home:  · Resume regular diet  · Rest in bed or on the couch for the next 24 hours  · No heavy lifting, bending, twisting or straining for 24 hours  · You may remove bandage in the morning if no bleeding.  · You may shower the next day.  · Be sure to wash your hands before touching the wound or dressing.  · You may need to limit your activity for 3 to 5 days depending on your normal daily routine.  · Avoid sports or activities that involve rough contact with your belly.  What Problems Could Happen?  · Pain  · Bleeding  · Hematoma (collection of blood under the skin)  · Infection  · Collapsed lung  When Should I Call the Doctor?  · Signs of infection: fever of 100.4 or higher, chills or non-healing wound.  · Signs of a wound infection: swelling, redness, warmth around the wound; too much pain when  touched; yellowish, greenish or bloody discharge; foul smell coming from the cut site; cute site  opens up.  · You are not feeling better in 2 to 3 days or you are feeling worse.  · Signs of a hematoma: if skin around site turns dark red or purple, abdomen gets firm to the  touch, or if develop severe pain.  · Signs of bleeding: blood on Band-Aid, if this occurs, roll up face towel and put at site, lie on that  side until bleeding stops or have someone put direct pressure on it until the bleeding stops. If  you cannot get the bleeding to stop then call the doctor.  _____________________________________________________________________________________  Signature of Patient or Responsible Person Date Time  _____________________________________________________________________________________  Signature of Instructing Nurse Date Time

## 2025-03-20 NOTE — PROGRESS NOTES
Patient resting in bed with spouse at bedside.  No complaints of pain.  Puncture site on right flank is clean and dry.  Rolled towel in place on site.

## 2025-03-20 NOTE — PROGRESS NOTES
Mrs Marrufo is here today for a ct guided liver biopsy for elevated Ferritin and carrier of HFE gene mutation and provider is having this to elevuate for Iron Hemochromatosis.  Procedure verified with the patient and she was escorted to room 1 in [facility].  She was oriented to room and call light.  Procedure explained and she was gowned and IV was started in her right AC without complications. Medications and allergies reviewed and updated in epic. Assessment completed.   Call light within reach.  Vitals obtained and her BP was 140/96.  She stated she did not take her BP medication prior to arrival.  I asked her  to get the medication for her to take prior to procedure.  Labs were resulted on 3/18/25 and within limits to proceed with procedure.  H/P dated 3/13/2025 by Beverly SCHWARZ.  Orders received.  Patient spouse at bedside.

## 2025-03-20 NOTE — PROGRESS NOTES
Lunch offered and patient accepted.  Patient denies pain.  Patient was assisted to roll on back side at this time and puncture site is clean and dry.  Patient denies pain.  Discharge instructions reviewed and I told her and her spouse we will review them again prior to discharge.  Vitals stable

## 2025-03-20 NOTE — PROGRESS NOTES
Date of the Proposed Procedure:   3/20/2025     Proposed Procedure:      Radiologist:  Flaco Vera MD    Indications:  Abnormal lab results    American Society of Anesthesiologists (ASA) Classification:  ASA 2 - Patient with mild systemic disease with no functional limitations      Plan of Sedation:  Moderate Sedation    Mallampati Classification: II (soft palate, uvula, fauces visible)    Prior to procedure:  I have reviewed the recent H&P from the referring physician, or other provider, related to ordered procedure. No interval changes were found upon examination/review since the original History and Physical / Consult Note.    I have performed a pre-procedure assessment of this patient on 3/20/2025, in the CT procedure room, in the presence of a  Registered Nurse. I discussed with the patient,  in detail,  the risks, benefits, and alternatives to this procedure.  Patient/Guardian is aware there are risks associated with moderate sedation which includes transient drop in blood pressure and need for assisted ventilation   EBL:.Less than 10ml    Plan for discharge:  Discharge patient after post procedure ordered recovery time.  Post jameel score at pre-procedure baseline, score of at least 8 prior to discharge.      Flaco Vera MD  3/20/64024:29 AM

## 2025-03-24 LAB — MISCELLANEOUS LAB TEST ORDER: ABNORMAL

## 2025-05-07 ENCOUNTER — HOSPITAL ENCOUNTER (OUTPATIENT)
Dept: GENERAL RADIOLOGY | Age: 60
Discharge: HOME | End: 2025-05-07
Payer: COMMERCIAL

## 2025-05-07 DIAGNOSIS — M25.512 CHRONIC LEFT SHOULDER PAIN: ICD-10-CM

## 2025-05-07 DIAGNOSIS — G89.29 CHRONIC LEFT SHOULDER PAIN: ICD-10-CM

## 2025-05-07 PROCEDURE — 73030 X-RAY EXAM OF SHOULDER: CPT

## 2025-05-08 ENCOUNTER — TELEPHONE (OUTPATIENT)
Dept: HEMATOLOGY | Age: 60
End: 2025-05-08

## 2025-05-08 NOTE — TELEPHONE ENCOUNTER

## 2025-05-12 NOTE — PROGRESS NOTES
OP HEMATOLOGY/ONCOLOGY PROGRESS NOTE      Pt Name: Demetrice Marrufo  YOB: 1965  MRN: [id number]  PCP: Dr. Hilda Mcdowell  Date of evaluation: 5/13/25    History Obtained From:  patient, spouse electronic medical record    CHIEF COMPLAINT:    Chief Complaint   Patient presents with    Follow-up     \"Follow up after liver biopsy.\"       History of Present Illness  Demetrice Marrufo is a 59-year-old  female returning to the clinic for follow-up of elevated ferritin, found to have compound heterozygosity for hemochromatosis gene mutations H63D and C282Y. MRI abdomen without contrast on 01/03/2025 at [facility] showed mild liver iron overload and mild hepatic steatosis without morphologic changes of cirrhosis. This was followed by liver biopsy on 03/20/2025 that documented zone 3 steatosis with minimal chronic portal inflammation, focal mild fibrous expansion of some portal tracts, and mildly increased hepatic iron stores by special stain (1+ on a scale of 1-5).   Calculated hepatic iron concentration using the Hepatic Iron Index (HII) based on patient's liver biopsy   (Iron concentration divided by 55.846 (the atomic weight of iron), divided by patient's age):  2080.1 / 50 5.846 / 61 = 0.61  This is suggestive of the presence of iron, but not overload.  She is here to discuss liver biopsy results and recommendations moving forward.      HEMATOLOGY HISTORY: Compound heterozygous hemochromatosis gene mutations H63D and C282Y with elevated ferritin  Demetrice Marrufo was seen in hematology consultation 1/13/2023, referred by Dr. Hilda Mcdowell for evaluation of elevated ferritin.   PMH significant for iron deficiency, hypertension, hyperlipidemia, obesity, allergies, arthritis, GERD, hypothyroidism, depression.  Demetrice states she was noted to have elevated ferritin level on routine serology.        11/30/2022 Labs:  Sed Rate: 75  Hepatitis B: Negative  Copper: 140.3  KARL: not detected   12/8/2022 US Abd 12/8/2022 at

## 2025-05-13 ENCOUNTER — OFFICE VISIT (OUTPATIENT)
Dept: HEMATOLOGY | Age: 60
End: 2025-05-13
Payer: COMMERCIAL

## 2025-05-13 ENCOUNTER — HOSPITAL ENCOUNTER (OUTPATIENT)
Dept: INFUSION THERAPY | Age: 60
Discharge: HOME | End: 2025-05-13
Payer: COMMERCIAL

## 2025-05-13 VITALS
DIASTOLIC BLOOD PRESSURE: 78 MMHG | HEART RATE: 95 BPM | TEMPERATURE: 97.7 F | HEIGHT: 63 IN | WEIGHT: 224.5 LBS | BODY MASS INDEX: 39.78 KG/M2 | OXYGEN SATURATION: 97 % | SYSTOLIC BLOOD PRESSURE: 120 MMHG

## 2025-05-13 DIAGNOSIS — E66.9 OBESITY (BMI 30-39.9): ICD-10-CM

## 2025-05-13 DIAGNOSIS — Z14.8 CARRIER OF HEMOCHROMATOSIS HFE GENE MUTATION: Primary | ICD-10-CM

## 2025-05-13 DIAGNOSIS — R79.89 ELEVATED FERRITIN LEVEL: ICD-10-CM

## 2025-05-13 DIAGNOSIS — Z14.8 CARRIER OF HEMOCHROMATOSIS HFE GENE MUTATION: ICD-10-CM

## 2025-05-13 DIAGNOSIS — K76.0 FATTY LIVER: ICD-10-CM

## 2025-05-13 LAB
BASOPHILS # BLD: 0.02 K/UL (ref 0–0.2)
BASOPHILS NFR BLD: 0.5 % (ref 0–1)
EOSINOPHIL # BLD: 0.16 K/UL (ref 0–0.6)
EOSINOPHIL NFR BLD: 3.7 % (ref 0–5)
ERYTHROCYTE [DISTWIDTH] IN BLOOD BY AUTOMATED COUNT: 12.9 % (ref 11.5–14.5)
FERRITIN SERPL-MCNC: 784 NG/ML (ref 13–150)
HCT VFR BLD AUTO: 37 % (ref 37–47)
HGB BLD-MCNC: 12.8 G/DL (ref 12–16)
LYMPHOCYTES # BLD: 1.4 K/UL (ref 1.1–4.5)
LYMPHOCYTES NFR BLD: 32.6 % (ref 20–40)
MCH RBC QN AUTO: 31.8 PG (ref 27–31)
MCHC RBC AUTO-ENTMCNC: 34.6 G/DL (ref 33–37)
MCV RBC AUTO: 91.8 FL (ref 81–99)
MONOCYTES # BLD: 0.4 K/UL (ref 0–0.9)
MONOCYTES NFR BLD: 9.3 % (ref 1–10)
NEUTROPHILS # BLD: 2.3 K/UL (ref 1.5–7.5)
NEUTS SEG NFR BLD: 53.7 % (ref 50–65)
PLATELET # BLD AUTO: 203 K/UL (ref 130–400)
PMV BLD AUTO: 9.3 FL (ref 9.4–12.3)
RBC # BLD AUTO: 4.03 M/UL (ref 4.2–5.4)
WBC # BLD AUTO: 4.29 K/UL (ref 4.8–10.8)

## 2025-05-13 PROCEDURE — 99212 OFFICE O/P EST SF 10 MIN: CPT

## 2025-05-13 PROCEDURE — 82728 ASSAY OF FERRITIN: CPT

## 2025-05-13 PROCEDURE — 3078F DIAST BP <80 MM HG: CPT | Performed by: NURSE PRACTITIONER

## 2025-05-13 PROCEDURE — 3074F SYST BP LT 130 MM HG: CPT | Performed by: NURSE PRACTITIONER

## 2025-05-13 PROCEDURE — 36415 COLL VENOUS BLD VENIPUNCTURE: CPT

## 2025-05-13 PROCEDURE — 85025 COMPLETE CBC W/AUTO DIFF WBC: CPT

## 2025-05-13 PROCEDURE — 99214 OFFICE O/P EST MOD 30 MIN: CPT | Performed by: NURSE PRACTITIONER

## 2025-05-16 LAB
25(OH)D3 SERPL-MCNC: 53.1 NG/ML
ALBUMIN SERPL-MCNC: 4.2 G/DL (ref 3.5–5.2)
ALP SERPL-CCNC: 79 U/L (ref 35–104)
ALT SERPL-CCNC: 20 U/L (ref 10–35)
ANION GAP SERPL CALCULATED.3IONS-SCNC: 13 MMOL/L (ref 8–16)
AST SERPL-CCNC: 24 U/L (ref 10–35)
BILIRUB SERPL-MCNC: 0.3 MG/DL (ref 0.2–1.2)
BUN SERPL-MCNC: 10 MG/DL (ref 8–23)
CALCIUM SERPL-MCNC: 9.6 MG/DL (ref 8.8–10.2)
CHLORIDE SERPL-SCNC: 99 MMOL/L (ref 98–107)
CHOLEST SERPL-MCNC: 176 MG/DL (ref 0–199)
CO2 SERPL-SCNC: 26 MMOL/L (ref 22–29)
CREAT SERPL-MCNC: 0.8 MG/DL (ref 0.5–0.9)
CREAT UR-MCNC: 65.6 MG/DL (ref 28–217)
GLUCOSE SERPL-MCNC: 101 MG/DL (ref 70–99)
HBA1C MFR BLD: 5.7 % (ref 4–5.6)
HDLC SERPL-MCNC: 67 MG/DL (ref 40–60)
LDLC SERPL CALC-MCNC: 94 MG/DL
MICROALBUMIN UR-MCNC: <1.2 MG/DL (ref 0–1.99)
MICROALBUMIN/CREAT UR-RTO: NORMAL MG/G (ref 0–29)
POTASSIUM SERPL-SCNC: 4.1 MMOL/L (ref 3.5–5.1)
PROT SERPL-MCNC: 7.1 G/DL (ref 6.4–8.3)
SODIUM SERPL-SCNC: 138 MMOL/L (ref 136–145)
TRIGL SERPL-MCNC: 74 MG/DL (ref 0–149)

## 2025-05-19 ASSESSMENT — ENCOUNTER SYMPTOMS
SORE THROAT: 0
TROUBLE SWALLOWING: 0
COUGH: 0
VOMITING: 0
NAUSEA: 0
EYE DISCHARGE: 0
CONSTIPATION: 0
EYE ITCHING: 0
DIARRHEA: 0
SHORTNESS OF BREATH: 1
ABDOMINAL PAIN: 0
BACK PAIN: 1
WHEEZING: 0

## (undated) DEVICE — FORCEPS BX L240CM JAW DIA2.4MM ORNG L CAP W/ NDL DISP RAD

## (undated) DEVICE — ENDO KIT,LOURDES HOSPITAL: Brand: MEDLINE INDUSTRIES, INC.